# Patient Record
Sex: FEMALE | Race: BLACK OR AFRICAN AMERICAN | NOT HISPANIC OR LATINO | Employment: FULL TIME | URBAN - NONMETROPOLITAN AREA
[De-identification: names, ages, dates, MRNs, and addresses within clinical notes are randomized per-mention and may not be internally consistent; named-entity substitution may affect disease eponyms.]

---

## 2019-01-10 ENCOUNTER — ANESTHESIA EVENT (OUTPATIENT)
Dept: CARDIOVASCULAR ICU | Facility: HOSPITAL | Age: 29
End: 2019-01-10

## 2019-01-10 ENCOUNTER — ANESTHESIA (OUTPATIENT)
Dept: CARDIOVASCULAR ICU | Facility: HOSPITAL | Age: 29
End: 2019-01-10

## 2019-01-10 ENCOUNTER — ANESTHESIA EVENT (OUTPATIENT)
Dept: PERIOP | Facility: HOSPITAL | Age: 29
End: 2019-01-10

## 2019-01-10 ENCOUNTER — HOSPITAL ENCOUNTER (INPATIENT)
Facility: HOSPITAL | Age: 29
LOS: 7 days | Discharge: HOME | End: 2019-01-17
Attending: INTERNAL MEDICINE | Admitting: THORACIC SURGERY (CARDIOTHORACIC VASCULAR SURGERY)

## 2019-01-10 ENCOUNTER — APPOINTMENT (OUTPATIENT)
Dept: GENERAL RADIOLOGY | Facility: HOSPITAL | Age: 29
End: 2019-01-10

## 2019-01-10 DIAGNOSIS — J90 PLEURAL EFFUSION: ICD-10-CM

## 2019-01-10 DIAGNOSIS — I30.9 ACUTE PERICARDITIS ASSOCIATED WITH RHEUMATOID ARTHRITIS (HCC): ICD-10-CM

## 2019-01-10 DIAGNOSIS — M06.9 RHEUMATOID ARTHRITIS, INVOLVING UNSPECIFIED SITE, UNSPECIFIED RHEUMATOID FACTOR PRESENCE: ICD-10-CM

## 2019-01-10 DIAGNOSIS — M06.9 ACUTE PERICARDITIS ASSOCIATED WITH RHEUMATOID ARTHRITIS (HCC): ICD-10-CM

## 2019-01-10 DIAGNOSIS — I31.4 CARDIAC TAMPONADE: Primary | ICD-10-CM

## 2019-01-10 PROBLEM — D57.3 SICKLE CELL TRAIT (HCC): Status: ACTIVE | Noted: 2019-01-10

## 2019-01-10 PROBLEM — I31.9 PERICARDITIS: Status: ACTIVE | Noted: 2019-01-10

## 2019-01-10 PROBLEM — R07.9 CHEST PAIN: Status: ACTIVE | Noted: 2019-01-10

## 2019-01-10 PROBLEM — R00.0 SINUS TACHYCARDIA: Status: ACTIVE | Noted: 2019-01-10

## 2019-01-10 LAB
ABO GROUP BLD: NORMAL
ALBUMIN SERPL-MCNC: 3 G/DL (ref 3.5–5)
ALBUMIN/GLOB SERPL: 0.8 G/DL (ref 1.1–2.5)
ALP SERPL-CCNC: 83 U/L (ref 24–120)
ALT SERPL W P-5'-P-CCNC: 53 U/L (ref 0–54)
ANION GAP SERPL CALCULATED.3IONS-SCNC: 12 MMOL/L (ref 4–13)
APTT PPP: 38.8 SECONDS (ref 24.1–34.8)
AST SERPL-CCNC: 35 U/L (ref 7–45)
BASOPHILS # BLD AUTO: 0.02 10*3/MM3 (ref 0–0.2)
BASOPHILS NFR BLD AUTO: 0.2 % (ref 0–2)
BILIRUB SERPL-MCNC: 0.7 MG/DL (ref 0.1–1)
BLD GP AB SCN SERPL QL: NEGATIVE
BUN BLD-MCNC: 7 MG/DL (ref 5–21)
BUN/CREAT SERPL: 10.3 (ref 7–25)
CALCIUM SPEC-SCNC: 7.7 MG/DL (ref 8.4–10.4)
CHLORIDE SERPL-SCNC: 99 MMOL/L (ref 98–110)
CHROMATIN AB SERPL-ACNC: 185.1 IU/ML (ref 0–11.9)
CK SERPL-CCNC: 58 U/L (ref 0–203)
CO2 SERPL-SCNC: 29 MMOL/L (ref 24–31)
CREAT BLD-MCNC: 0.68 MG/DL (ref 0.5–1.4)
CRP SERPL-MCNC: 23.88 MG/DL (ref 0–0.99)
D-LACTATE SERPL-SCNC: 1.1 MMOL/L (ref 0.5–2)
DEPRECATED RDW RBC AUTO: 43.8 FL (ref 40–54)
EOSINOPHIL # BLD AUTO: 0.24 10*3/MM3 (ref 0–0.7)
EOSINOPHIL NFR BLD AUTO: 2.3 % (ref 0–4)
ERYTHROCYTE [DISTWIDTH] IN BLOOD BY AUTOMATED COUNT: 14.5 % (ref 12–15)
ERYTHROCYTE [SEDIMENTATION RATE] IN BLOOD: 46 MM/HR (ref 0–20)
GFR SERPL CREATININE-BSD FRML MDRD: 124 ML/MIN/1.73
GLOBULIN UR ELPH-MCNC: 3.6 GM/DL
GLUCOSE BLD-MCNC: 100 MG/DL (ref 70–100)
HAV IGM SERPL QL IA: NEGATIVE
HBV CORE IGM SERPL QL IA: NEGATIVE
HBV SURFACE AG SERPL QL IA: NEGATIVE
HCT VFR BLD AUTO: 25.7 % (ref 37–47)
HCV AB SER DONR QL: NEGATIVE
HCV S/C RATIO: 0.05 (ref 0–0.99)
HGB BLD-MCNC: 8.4 G/DL (ref 12–16)
IMM GRANULOCYTES # BLD AUTO: 0.04 10*3/MM3 (ref 0–0.03)
IMM GRANULOCYTES NFR BLD AUTO: 0.4 % (ref 0–5)
INR PPP: 1.4 (ref 0.91–1.09)
LYMPHOCYTES # BLD AUTO: 1.14 10*3/MM3 (ref 0.72–4.86)
LYMPHOCYTES NFR BLD AUTO: 10.8 % (ref 15–45)
MCH RBC QN AUTO: 27.2 PG (ref 28–32)
MCHC RBC AUTO-ENTMCNC: 32.7 G/DL (ref 33–36)
MCV RBC AUTO: 83.2 FL (ref 82–98)
MONOCYTES # BLD AUTO: 1.26 10*3/MM3 (ref 0.19–1.3)
MONOCYTES NFR BLD AUTO: 11.9 % (ref 4–12)
NEUTROPHILS # BLD AUTO: 7.87 10*3/MM3 (ref 1.87–8.4)
NEUTROPHILS NFR BLD AUTO: 74.4 % (ref 39–78)
NRBC BLD AUTO-RTO: 0 /100 WBC (ref 0–0)
PLATELET # BLD AUTO: 444 10*3/MM3 (ref 130–400)
PMV BLD AUTO: 10.9 FL (ref 6–12)
POTASSIUM BLD-SCNC: 3.2 MMOL/L (ref 3.5–5.3)
PROCALCITONIN SERPL-MCNC: <0.25 NG/ML
PROT SERPL-MCNC: 6.6 G/DL (ref 6.3–8.7)
PROTHROMBIN TIME: 17.6 SECONDS (ref 11.9–14.6)
RBC # BLD AUTO: 3.09 10*6/MM3 (ref 4.2–5.4)
RH BLD: POSITIVE
SODIUM BLD-SCNC: 140 MMOL/L (ref 135–145)
T&S EXPIRATION DATE: NORMAL
TROPONIN I SERPL-MCNC: <0.012 NG/ML (ref 0–0.03)
TSH SERPL DL<=0.05 MIU/L-ACNC: 1.38 MIU/ML (ref 0.47–4.68)
WBC NRBC COR # BLD: 10.57 10*3/MM3 (ref 4.8–10.8)

## 2019-01-10 PROCEDURE — 83520 IMMUNOASSAY QUANT NOS NONAB: CPT | Performed by: INTERNAL MEDICINE

## 2019-01-10 PROCEDURE — 86850 RBC ANTIBODY SCREEN: CPT | Performed by: THORACIC SURGERY (CARDIOTHORACIC VASCULAR SURGERY)

## 2019-01-10 PROCEDURE — 71045 X-RAY EXAM CHEST 1 VIEW: CPT

## 2019-01-10 PROCEDURE — 80053 COMPREHEN METABOLIC PANEL: CPT | Performed by: INTERNAL MEDICINE

## 2019-01-10 PROCEDURE — 86256 FLUORESCENT ANTIBODY TITER: CPT | Performed by: INTERNAL MEDICINE

## 2019-01-10 PROCEDURE — 86235 NUCLEAR ANTIGEN ANTIBODY: CPT | Performed by: INTERNAL MEDICINE

## 2019-01-10 PROCEDURE — 83516 IMMUNOASSAY NONANTIBODY: CPT | Performed by: INTERNAL MEDICINE

## 2019-01-10 PROCEDURE — 84484 ASSAY OF TROPONIN QUANT: CPT | Performed by: INTERNAL MEDICINE

## 2019-01-10 PROCEDURE — 85610 PROTHROMBIN TIME: CPT | Performed by: INTERNAL MEDICINE

## 2019-01-10 PROCEDURE — 93005 ELECTROCARDIOGRAM TRACING: CPT | Performed by: INTERNAL MEDICINE

## 2019-01-10 PROCEDURE — 87150 DNA/RNA AMPLIFIED PROBE: CPT | Performed by: INTERNAL MEDICINE

## 2019-01-10 PROCEDURE — 93010 ELECTROCARDIOGRAM REPORT: CPT | Performed by: INTERNAL MEDICINE

## 2019-01-10 PROCEDURE — 36415 COLL VENOUS BLD VENIPUNCTURE: CPT | Performed by: INTERNAL MEDICINE

## 2019-01-10 PROCEDURE — 86431 RHEUMATOID FACTOR QUANT: CPT | Performed by: INTERNAL MEDICINE

## 2019-01-10 PROCEDURE — 86160 COMPLEMENT ANTIGEN: CPT | Performed by: INTERNAL MEDICINE

## 2019-01-10 PROCEDURE — 82550 ASSAY OF CK (CPK): CPT | Performed by: INTERNAL MEDICINE

## 2019-01-10 PROCEDURE — 84145 PROCALCITONIN (PCT): CPT | Performed by: INTERNAL MEDICINE

## 2019-01-10 PROCEDURE — 85730 THROMBOPLASTIN TIME PARTIAL: CPT | Performed by: THORACIC SURGERY (CARDIOTHORACIC VASCULAR SURGERY)

## 2019-01-10 PROCEDURE — 94799 UNLISTED PULMONARY SVC/PX: CPT

## 2019-01-10 PROCEDURE — 86901 BLOOD TYPING SEROLOGIC RH(D): CPT | Performed by: THORACIC SURGERY (CARDIOTHORACIC VASCULAR SURGERY)

## 2019-01-10 PROCEDURE — 84443 ASSAY THYROID STIM HORMONE: CPT | Performed by: INTERNAL MEDICINE

## 2019-01-10 PROCEDURE — 82785 ASSAY OF IGE: CPT | Performed by: INTERNAL MEDICINE

## 2019-01-10 PROCEDURE — 86038 ANTINUCLEAR ANTIBODIES: CPT | Performed by: INTERNAL MEDICINE

## 2019-01-10 PROCEDURE — 03HY32Z INSERTION OF MONITORING DEVICE INTO UPPER ARTERY, PERCUTANEOUS APPROACH: ICD-10-PCS | Performed by: INTERNAL MEDICINE

## 2019-01-10 PROCEDURE — 85651 RBC SED RATE NONAUTOMATED: CPT | Performed by: INTERNAL MEDICINE

## 2019-01-10 PROCEDURE — 85025 COMPLETE CBC W/AUTO DIFF WBC: CPT | Performed by: INTERNAL MEDICINE

## 2019-01-10 PROCEDURE — 25010000002 METHYLPREDNISOLONE PER 125 MG: Performed by: INTERNAL MEDICINE

## 2019-01-10 PROCEDURE — 86900 BLOOD TYPING SEROLOGIC ABO: CPT | Performed by: THORACIC SURGERY (CARDIOTHORACIC VASCULAR SURGERY)

## 2019-01-10 PROCEDURE — 80074 ACUTE HEPATITIS PANEL: CPT | Performed by: INTERNAL MEDICINE

## 2019-01-10 PROCEDURE — 87040 BLOOD CULTURE FOR BACTERIA: CPT | Performed by: INTERNAL MEDICINE

## 2019-01-10 PROCEDURE — 86140 C-REACTIVE PROTEIN: CPT | Performed by: INTERNAL MEDICINE

## 2019-01-10 PROCEDURE — 86200 CCP ANTIBODY: CPT | Performed by: INTERNAL MEDICINE

## 2019-01-10 PROCEDURE — 83605 ASSAY OF LACTIC ACID: CPT | Performed by: INTERNAL MEDICINE

## 2019-01-10 PROCEDURE — 86225 DNA ANTIBODY NATIVE: CPT | Performed by: INTERNAL MEDICINE

## 2019-01-10 PROCEDURE — 87147 CULTURE TYPE IMMUNOLOGIC: CPT | Performed by: INTERNAL MEDICINE

## 2019-01-10 RX ORDER — METHYLPREDNISOLONE SODIUM SUCCINATE 125 MG/2ML
80 INJECTION, POWDER, LYOPHILIZED, FOR SOLUTION INTRAMUSCULAR; INTRAVENOUS EVERY 12 HOURS
Status: DISCONTINUED | OUTPATIENT
Start: 2019-01-10 | End: 2019-01-11 | Stop reason: HOSPADM

## 2019-01-10 RX ORDER — SODIUM CHLORIDE 0.9 % (FLUSH) 0.9 %
3 SYRINGE (ML) INJECTION EVERY 12 HOURS SCHEDULED
Status: DISCONTINUED | OUTPATIENT
Start: 2019-01-10 | End: 2019-01-11 | Stop reason: HOSPADM

## 2019-01-10 RX ORDER — ONDANSETRON 2 MG/ML
4 INJECTION INTRAMUSCULAR; INTRAVENOUS EVERY 6 HOURS PRN
Status: DISCONTINUED | OUTPATIENT
Start: 2019-01-10 | End: 2019-01-11 | Stop reason: HOSPADM

## 2019-01-10 RX ORDER — IBUPROFEN 800 MG/1
800 TABLET ORAL 3 TIMES DAILY
Status: DISCONTINUED | OUTPATIENT
Start: 2019-01-10 | End: 2019-01-11 | Stop reason: HOSPADM

## 2019-01-10 RX ORDER — SODIUM CHLORIDE, SODIUM LACTATE, POTASSIUM CHLORIDE, CALCIUM CHLORIDE 600; 310; 30; 20 MG/100ML; MG/100ML; MG/100ML; MG/100ML
125 INJECTION, SOLUTION INTRAVENOUS CONTINUOUS
Status: DISCONTINUED | OUTPATIENT
Start: 2019-01-10 | End: 2019-01-11 | Stop reason: HOSPADM

## 2019-01-10 RX ORDER — NALOXONE HCL 0.4 MG/ML
0.4 VIAL (ML) INJECTION
Status: DISCONTINUED | OUTPATIENT
Start: 2019-01-10 | End: 2019-01-11 | Stop reason: HOSPADM

## 2019-01-10 RX ORDER — POTASSIUM CHLORIDE 750 MG/1
40 CAPSULE, EXTENDED RELEASE ORAL 2 TIMES DAILY WITH MEALS
Status: DISCONTINUED | OUTPATIENT
Start: 2019-01-10 | End: 2019-01-11 | Stop reason: HOSPADM

## 2019-01-10 RX ORDER — COLCHICINE 0.6 MG/1
0.6 TABLET ORAL EVERY 12 HOURS SCHEDULED
Status: DISCONTINUED | OUTPATIENT
Start: 2019-01-10 | End: 2019-01-11 | Stop reason: HOSPADM

## 2019-01-10 RX ORDER — SODIUM CHLORIDE 0.9 % (FLUSH) 0.9 %
3-10 SYRINGE (ML) INJECTION AS NEEDED
Status: DISCONTINUED | OUTPATIENT
Start: 2019-01-10 | End: 2019-01-11 | Stop reason: HOSPADM

## 2019-01-10 RX ORDER — ACETAMINOPHEN 325 MG/1
650 TABLET ORAL EVERY 4 HOURS PRN
Status: DISCONTINUED | OUTPATIENT
Start: 2019-01-10 | End: 2019-01-11 | Stop reason: HOSPADM

## 2019-01-10 RX ORDER — MORPHINE SULFATE 2 MG/ML
1 INJECTION, SOLUTION INTRAMUSCULAR; INTRAVENOUS EVERY 4 HOURS PRN
Status: DISCONTINUED | OUTPATIENT
Start: 2019-01-10 | End: 2019-01-11 | Stop reason: HOSPADM

## 2019-01-10 RX ADMIN — METHYLPREDNISOLONE SODIUM SUCCINATE 80 MG: 125 INJECTION, POWDER, FOR SOLUTION INTRAMUSCULAR; INTRAVENOUS at 20:54

## 2019-01-10 RX ADMIN — COLCHICINE 0.6 MG: 0.6 TABLET, FILM COATED ORAL at 21:49

## 2019-01-10 RX ADMIN — SODIUM CHLORIDE, PRESERVATIVE FREE 3 ML: 5 INJECTION INTRAVENOUS at 20:55

## 2019-01-10 RX ADMIN — IBUPROFEN 800 MG: 800 TABLET ORAL at 21:50

## 2019-01-10 RX ADMIN — SODIUM CHLORIDE, POTASSIUM CHLORIDE, SODIUM LACTATE AND CALCIUM CHLORIDE 125 ML/HR: 600; 310; 30; 20 INJECTION, SOLUTION INTRAVENOUS at 20:06

## 2019-01-10 RX ADMIN — POTASSIUM CHLORIDE 40 MEQ: 750 CAPSULE, EXTENDED RELEASE ORAL at 22:57

## 2019-01-11 ENCOUNTER — APPOINTMENT (OUTPATIENT)
Dept: GENERAL RADIOLOGY | Facility: HOSPITAL | Age: 29
End: 2019-01-11

## 2019-01-11 ENCOUNTER — ANESTHESIA (OUTPATIENT)
Dept: PERIOP | Facility: HOSPITAL | Age: 29
End: 2019-01-11

## 2019-01-11 PROBLEM — J96.01 ACUTE HYPOXEMIC RESPIRATORY FAILURE (HCC): Status: ACTIVE | Noted: 2019-01-11

## 2019-01-11 LAB
ANION GAP SERPL CALCULATED.3IONS-SCNC: 10 MMOL/L (ref 4–13)
ANION GAP SERPL CALCULATED.3IONS-SCNC: 9 MMOL/L (ref 4–13)
BACTERIA BLD CULT: ABNORMAL
BACTERIA UR QL AUTO: ABNORMAL /HPF
BILIRUB UR QL STRIP: NEGATIVE
BUN BLD-MCNC: 8 MG/DL (ref 5–21)
BUN BLD-MCNC: 9 MG/DL (ref 5–21)
BUN/CREAT SERPL: 14.8 (ref 7–25)
BUN/CREAT SERPL: 20 (ref 7–25)
CALCIUM SPEC-SCNC: 7.8 MG/DL (ref 8.4–10.4)
CALCIUM SPEC-SCNC: 7.9 MG/DL (ref 8.4–10.4)
CHLORIDE SERPL-SCNC: 103 MMOL/L (ref 98–110)
CHLORIDE SERPL-SCNC: 103 MMOL/L (ref 98–110)
CLARITY UR: CLEAR
CO2 SERPL-SCNC: 27 MMOL/L (ref 24–31)
CO2 SERPL-SCNC: 28 MMOL/L (ref 24–31)
COLOR UR: ABNORMAL
CREAT BLD-MCNC: 0.45 MG/DL (ref 0.5–1.4)
CREAT BLD-MCNC: 0.54 MG/DL (ref 0.5–1.4)
DEPRECATED RDW RBC AUTO: 41.9 FL (ref 40–54)
DEPRECATED RDW RBC AUTO: 42.5 FL (ref 40–54)
ERYTHROCYTE [DISTWIDTH] IN BLOOD BY AUTOMATED COUNT: 14.2 % (ref 12–15)
ERYTHROCYTE [DISTWIDTH] IN BLOOD BY AUTOMATED COUNT: 14.3 % (ref 12–15)
GFR SERPL CREATININE-BSD FRML MDRD: >150 ML/MIN/1.73
GFR SERPL CREATININE-BSD FRML MDRD: >150 ML/MIN/1.73
GLUCOSE BLD-MCNC: 138 MG/DL (ref 70–100)
GLUCOSE BLD-MCNC: 141 MG/DL (ref 70–100)
GLUCOSE UR STRIP-MCNC: NEGATIVE MG/DL
HCT VFR BLD AUTO: 23.8 % (ref 37–47)
HCT VFR BLD AUTO: 30.3 % (ref 37–47)
HGB BLD-MCNC: 10 G/DL (ref 12–16)
HGB BLD-MCNC: 7.9 G/DL (ref 12–16)
HGB UR QL STRIP.AUTO: NEGATIVE
HIV1+2 AB SER QL: NEGATIVE
HYALINE CASTS UR QL AUTO: ABNORMAL /LPF
KETONES UR QL STRIP: NEGATIVE
LEUKOCYTE ESTERASE UR QL STRIP.AUTO: ABNORMAL
MCH RBC QN AUTO: 26.7 PG (ref 28–32)
MCH RBC QN AUTO: 27 PG (ref 28–32)
MCHC RBC AUTO-ENTMCNC: 33 G/DL (ref 33–36)
MCHC RBC AUTO-ENTMCNC: 33.2 G/DL (ref 33–36)
MCV RBC AUTO: 81 FL (ref 82–98)
MCV RBC AUTO: 81.2 FL (ref 82–98)
NITRITE UR QL STRIP: NEGATIVE
PH UR STRIP.AUTO: 5.5 [PH] (ref 5–8)
PLATELET # BLD AUTO: 370 10*3/MM3 (ref 130–400)
PLATELET # BLD AUTO: 417 10*3/MM3 (ref 130–400)
PMV BLD AUTO: 10.4 FL (ref 6–12)
PMV BLD AUTO: 10.6 FL (ref 6–12)
POTASSIUM BLD-SCNC: 3.9 MMOL/L (ref 3.5–5.3)
POTASSIUM BLD-SCNC: 4.1 MMOL/L (ref 3.5–5.3)
PROT UR QL STRIP: ABNORMAL
RBC # BLD AUTO: 2.93 10*6/MM3 (ref 4.2–5.4)
RBC # BLD AUTO: 3.74 10*6/MM3 (ref 4.2–5.4)
RBC # UR: ABNORMAL /HPF
REF LAB TEST METHOD: ABNORMAL
SODIUM BLD-SCNC: 140 MMOL/L (ref 135–145)
SODIUM BLD-SCNC: 140 MMOL/L (ref 135–145)
SP GR UR STRIP: 1.01 (ref 1–1.03)
SQUAMOUS #/AREA URNS HPF: ABNORMAL /HPF
UROBILINOGEN UR QL STRIP: ABNORMAL
WBC NRBC COR # BLD: 10.58 10*3/MM3 (ref 4.8–10.8)
WBC NRBC COR # BLD: 9.4 10*3/MM3 (ref 4.8–10.8)
WBC UR QL AUTO: ABNORMAL /HPF

## 2019-01-11 PROCEDURE — 80048 BASIC METABOLIC PNL TOTAL CA: CPT | Performed by: THORACIC SURGERY (CARDIOTHORACIC VASCULAR SURGERY)

## 2019-01-11 PROCEDURE — 25010000002 NEOSTIGMINE PER 0.5 MG: Performed by: NURSE ANESTHETIST, CERTIFIED REGISTERED

## 2019-01-11 PROCEDURE — 81001 URINALYSIS AUTO W/SCOPE: CPT | Performed by: THORACIC SURGERY (CARDIOTHORACIC VASCULAR SURGERY)

## 2019-01-11 PROCEDURE — 0W9D00Z DRAINAGE OF PERICARDIAL CAVITY WITH DRAINAGE DEVICE, OPEN APPROACH: ICD-10-PCS | Performed by: THORACIC SURGERY (CARDIOTHORACIC VASCULAR SURGERY)

## 2019-01-11 PROCEDURE — 94799 UNLISTED PULMONARY SVC/PX: CPT

## 2019-01-11 PROCEDURE — 25010000002 ONDANSETRON PER 1 MG: Performed by: NURSE ANESTHETIST, CERTIFIED REGISTERED

## 2019-01-11 PROCEDURE — 86480 TB TEST CELL IMMUN MEASURE: CPT | Performed by: INTERNAL MEDICINE

## 2019-01-11 PROCEDURE — 80048 BASIC METABOLIC PNL TOTAL CA: CPT | Performed by: INTERNAL MEDICINE

## 2019-01-11 PROCEDURE — 94760 N-INVAS EAR/PLS OXIMETRY 1: CPT

## 2019-01-11 PROCEDURE — 85027 COMPLETE CBC AUTOMATED: CPT | Performed by: THORACIC SURGERY (CARDIOTHORACIC VASCULAR SURGERY)

## 2019-01-11 PROCEDURE — G0432 EIA HIV-1/HIV-2 SCREEN: HCPCS | Performed by: INTERNAL MEDICINE

## 2019-01-11 PROCEDURE — C1729 CATH, DRAINAGE: HCPCS | Performed by: THORACIC SURGERY (CARDIOTHORACIC VASCULAR SURGERY)

## 2019-01-11 PROCEDURE — 85027 COMPLETE CBC AUTOMATED: CPT | Performed by: INTERNAL MEDICINE

## 2019-01-11 PROCEDURE — 87206 SMEAR FLUORESCENT/ACID STAI: CPT | Performed by: THORACIC SURGERY (CARDIOTHORACIC VASCULAR SURGERY)

## 2019-01-11 PROCEDURE — 87205 SMEAR GRAM STAIN: CPT | Performed by: THORACIC SURGERY (CARDIOTHORACIC VASCULAR SURGERY)

## 2019-01-11 PROCEDURE — 87176 TISSUE HOMOGENIZATION CULTR: CPT | Performed by: THORACIC SURGERY (CARDIOTHORACIC VASCULAR SURGERY)

## 2019-01-11 PROCEDURE — 25010000002 MIDAZOLAM PER 1 MG: Performed by: NURSE ANESTHETIST, CERTIFIED REGISTERED

## 2019-01-11 PROCEDURE — 33025 INCISION OF HEART SAC: CPT | Performed by: THORACIC SURGERY (CARDIOTHORACIC VASCULAR SURGERY)

## 2019-01-11 PROCEDURE — 87102 FUNGUS ISOLATION CULTURE: CPT | Performed by: THORACIC SURGERY (CARDIOTHORACIC VASCULAR SURGERY)

## 2019-01-11 PROCEDURE — 87070 CULTURE OTHR SPECIMN AEROBIC: CPT | Performed by: THORACIC SURGERY (CARDIOTHORACIC VASCULAR SURGERY)

## 2019-01-11 PROCEDURE — 25010000002 CEFAZOLIN PER 500 MG: Performed by: THORACIC SURGERY (CARDIOTHORACIC VASCULAR SURGERY)

## 2019-01-11 PROCEDURE — 25010000002 MIDAZOLAM PER 1 MG: Performed by: ANESTHESIOLOGY

## 2019-01-11 PROCEDURE — 25010000002 METHYLPREDNISOLONE PER 125 MG: Performed by: ANESTHESIOLOGY

## 2019-01-11 PROCEDURE — 87015 SPECIMEN INFECT AGNT CONCNTJ: CPT | Performed by: THORACIC SURGERY (CARDIOTHORACIC VASCULAR SURGERY)

## 2019-01-11 PROCEDURE — 88112 CYTOPATH CELL ENHANCE TECH: CPT | Performed by: THORACIC SURGERY (CARDIOTHORACIC VASCULAR SURGERY)

## 2019-01-11 PROCEDURE — 87075 CULTR BACTERIA EXCEPT BLOOD: CPT | Performed by: THORACIC SURGERY (CARDIOTHORACIC VASCULAR SURGERY)

## 2019-01-11 PROCEDURE — 88305 TISSUE EXAM BY PATHOLOGIST: CPT | Performed by: THORACIC SURGERY (CARDIOTHORACIC VASCULAR SURGERY)

## 2019-01-11 PROCEDURE — 94640 AIRWAY INHALATION TREATMENT: CPT

## 2019-01-11 PROCEDURE — 25010000003 CEFAZOLIN PER 500 MG: Performed by: NURSE ANESTHETIST, CERTIFIED REGISTERED

## 2019-01-11 PROCEDURE — 87116 MYCOBACTERIA CULTURE: CPT | Performed by: THORACIC SURGERY (CARDIOTHORACIC VASCULAR SURGERY)

## 2019-01-11 PROCEDURE — 88312 SPECIAL STAINS GROUP 1: CPT | Performed by: THORACIC SURGERY (CARDIOTHORACIC VASCULAR SURGERY)

## 2019-01-11 PROCEDURE — 02BN0ZZ EXCISION OF PERICARDIUM, OPEN APPROACH: ICD-10-PCS | Performed by: THORACIC SURGERY (CARDIOTHORACIC VASCULAR SURGERY)

## 2019-01-11 PROCEDURE — 71045 X-RAY EXAM CHEST 1 VIEW: CPT

## 2019-01-11 RX ORDER — PREDNISONE 1 MG/1
5 TABLET ORAL DAILY
Status: DISCONTINUED | OUTPATIENT
Start: 2019-01-18 | End: 2019-01-17 | Stop reason: HOSPADM

## 2019-01-11 RX ORDER — BUPIVACAINE HCL/0.9 % NACL/PF 0.1 %
2 PLASTIC BAG, INJECTION (ML) EPIDURAL EVERY 8 HOURS
Status: COMPLETED | OUTPATIENT
Start: 2019-01-11 | End: 2019-01-12

## 2019-01-11 RX ORDER — METHYLPREDNISOLONE SODIUM SUCCINATE 125 MG/2ML
125 INJECTION, POWDER, LYOPHILIZED, FOR SOLUTION INTRAMUSCULAR; INTRAVENOUS ONCE
Status: COMPLETED | OUTPATIENT
Start: 2019-01-11 | End: 2019-01-11

## 2019-01-11 RX ORDER — SODIUM CHLORIDE, SODIUM LACTATE, POTASSIUM CHLORIDE, CALCIUM CHLORIDE 600; 310; 30; 20 MG/100ML; MG/100ML; MG/100ML; MG/100ML
9 INJECTION, SOLUTION INTRAVENOUS CONTINUOUS
Status: DISCONTINUED | OUTPATIENT
Start: 2019-01-11 | End: 2019-01-11 | Stop reason: HOSPADM

## 2019-01-11 RX ORDER — LABETALOL HYDROCHLORIDE 5 MG/ML
5 INJECTION, SOLUTION INTRAVENOUS
Status: DISCONTINUED | OUTPATIENT
Start: 2019-01-11 | End: 2019-01-11 | Stop reason: HOSPADM

## 2019-01-11 RX ORDER — PREDNISONE 10 MG/1
10 TABLET ORAL DAILY
Status: COMPLETED | OUTPATIENT
Start: 2019-01-15 | End: 2019-01-17

## 2019-01-11 RX ORDER — CEFAZOLIN SODIUM 1 G/3ML
INJECTION, POWDER, FOR SOLUTION INTRAMUSCULAR; INTRAVENOUS AS NEEDED
Status: DISCONTINUED | OUTPATIENT
Start: 2019-01-11 | End: 2019-01-11 | Stop reason: SURG

## 2019-01-11 RX ORDER — PHENYLEPHRINE HCL IN 0.9% NACL 0.8MG/10ML
SYRINGE (ML) INTRAVENOUS AS NEEDED
Status: DISCONTINUED | OUTPATIENT
Start: 2019-01-11 | End: 2019-01-11 | Stop reason: SURG

## 2019-01-11 RX ORDER — FENTANYL CITRATE 50 UG/ML
25 INJECTION, SOLUTION INTRAMUSCULAR; INTRAVENOUS
Status: DISCONTINUED | OUTPATIENT
Start: 2019-01-11 | End: 2019-01-11 | Stop reason: HOSPADM

## 2019-01-11 RX ORDER — IPRATROPIUM BROMIDE AND ALBUTEROL SULFATE 2.5; .5 MG/3ML; MG/3ML
3 SOLUTION RESPIRATORY (INHALATION) ONCE AS NEEDED
Status: DISCONTINUED | OUTPATIENT
Start: 2019-01-11 | End: 2019-01-11 | Stop reason: HOSPADM

## 2019-01-11 RX ORDER — BISACODYL 10 MG
10 SUPPOSITORY, RECTAL RECTAL DAILY PRN
Status: DISCONTINUED | OUTPATIENT
Start: 2019-01-11 | End: 2019-01-17 | Stop reason: HOSPADM

## 2019-01-11 RX ORDER — SODIUM CHLORIDE 0.9 % (FLUSH) 0.9 %
1-10 SYRINGE (ML) INJECTION AS NEEDED
Status: DISCONTINUED | OUTPATIENT
Start: 2019-01-11 | End: 2019-01-11 | Stop reason: HOSPADM

## 2019-01-11 RX ORDER — IPRATROPIUM BROMIDE AND ALBUTEROL SULFATE 2.5; .5 MG/3ML; MG/3ML
3 SOLUTION RESPIRATORY (INHALATION)
Status: COMPLETED | OUTPATIENT
Start: 2019-01-11 | End: 2019-01-13

## 2019-01-11 RX ORDER — ONDANSETRON 4 MG/1
4 TABLET, FILM COATED ORAL EVERY 6 HOURS PRN
Status: DISCONTINUED | OUTPATIENT
Start: 2019-01-11 | End: 2019-01-17 | Stop reason: HOSPADM

## 2019-01-11 RX ORDER — ONDANSETRON 2 MG/ML
4 INJECTION INTRAMUSCULAR; INTRAVENOUS EVERY 6 HOURS PRN
Status: DISCONTINUED | OUTPATIENT
Start: 2019-01-11 | End: 2019-01-17 | Stop reason: HOSPADM

## 2019-01-11 RX ORDER — ACETYLCYSTEINE 200 MG/ML
3 SOLUTION ORAL; RESPIRATORY (INHALATION)
Status: DISCONTINUED | OUTPATIENT
Start: 2019-01-11 | End: 2019-01-13

## 2019-01-11 RX ORDER — MAGNESIUM HYDROXIDE 1200 MG/15ML
LIQUID ORAL AS NEEDED
Status: DISCONTINUED | OUTPATIENT
Start: 2019-01-11 | End: 2019-01-11 | Stop reason: HOSPADM

## 2019-01-11 RX ORDER — COLCHICINE 0.6 MG/1
0.6 TABLET ORAL EVERY 12 HOURS SCHEDULED
Status: DISCONTINUED | OUTPATIENT
Start: 2019-01-11 | End: 2019-01-17 | Stop reason: HOSPADM

## 2019-01-11 RX ORDER — MIDAZOLAM HYDROCHLORIDE 1 MG/ML
INJECTION INTRAMUSCULAR; INTRAVENOUS AS NEEDED
Status: DISCONTINUED | OUTPATIENT
Start: 2019-01-11 | End: 2019-01-11 | Stop reason: SURG

## 2019-01-11 RX ORDER — SUFENTANIL CITRATE 50 UG/ML
INJECTION EPIDURAL; INTRAVENOUS AS NEEDED
Status: DISCONTINUED | OUTPATIENT
Start: 2019-01-11 | End: 2019-01-11 | Stop reason: SURG

## 2019-01-11 RX ORDER — MIDAZOLAM HYDROCHLORIDE 1 MG/ML
1 INJECTION INTRAMUSCULAR; INTRAVENOUS
Status: DISCONTINUED | OUTPATIENT
Start: 2019-01-11 | End: 2019-01-11 | Stop reason: HOSPADM

## 2019-01-11 RX ORDER — ROCURONIUM BROMIDE 10 MG/ML
INJECTION, SOLUTION INTRAVENOUS AS NEEDED
Status: DISCONTINUED | OUTPATIENT
Start: 2019-01-11 | End: 2019-01-11 | Stop reason: SURG

## 2019-01-11 RX ORDER — NALOXONE HCL 0.4 MG/ML
0.04 VIAL (ML) INJECTION AS NEEDED
Status: DISCONTINUED | OUTPATIENT
Start: 2019-01-11 | End: 2019-01-11 | Stop reason: HOSPADM

## 2019-01-11 RX ORDER — ONDANSETRON 2 MG/ML
INJECTION INTRAMUSCULAR; INTRAVENOUS AS NEEDED
Status: DISCONTINUED | OUTPATIENT
Start: 2019-01-11 | End: 2019-01-11 | Stop reason: SURG

## 2019-01-11 RX ORDER — ONDANSETRON 4 MG/1
4 TABLET, ORALLY DISINTEGRATING ORAL EVERY 6 HOURS PRN
Status: DISCONTINUED | OUTPATIENT
Start: 2019-01-11 | End: 2019-01-17 | Stop reason: HOSPADM

## 2019-01-11 RX ORDER — SODIUM CHLORIDE, SODIUM LACTATE, POTASSIUM CHLORIDE, CALCIUM CHLORIDE 600; 310; 30; 20 MG/100ML; MG/100ML; MG/100ML; MG/100ML
20 INJECTION, SOLUTION INTRAVENOUS CONTINUOUS
Status: DISCONTINUED | OUTPATIENT
Start: 2019-01-11 | End: 2019-01-11 | Stop reason: HOSPADM

## 2019-01-11 RX ORDER — DOCUSATE SODIUM 100 MG/1
100 CAPSULE, LIQUID FILLED ORAL 2 TIMES DAILY
Status: DISCONTINUED | OUTPATIENT
Start: 2019-01-11 | End: 2019-01-17 | Stop reason: HOSPADM

## 2019-01-11 RX ORDER — DEXTROSE MONOHYDRATE 25 G/50ML
12.5 INJECTION, SOLUTION INTRAVENOUS AS NEEDED
Status: DISCONTINUED | OUTPATIENT
Start: 2019-01-11 | End: 2019-01-11 | Stop reason: HOSPADM

## 2019-01-11 RX ORDER — MORPHINE SULFATE 2 MG/ML
2 INJECTION, SOLUTION INTRAMUSCULAR; INTRAVENOUS
Status: DISCONTINUED | OUTPATIENT
Start: 2019-01-11 | End: 2019-01-11 | Stop reason: HOSPADM

## 2019-01-11 RX ORDER — SODIUM CHLORIDE 9 MG/ML
75 INJECTION, SOLUTION INTRAVENOUS CONTINUOUS
Status: DISCONTINUED | OUTPATIENT
Start: 2019-01-11 | End: 2019-01-14

## 2019-01-11 RX ORDER — ONDANSETRON 2 MG/ML
4 INJECTION INTRAMUSCULAR; INTRAVENOUS AS NEEDED
Status: DISCONTINUED | OUTPATIENT
Start: 2019-01-11 | End: 2019-01-11 | Stop reason: HOSPADM

## 2019-01-11 RX ORDER — PREDNISONE 20 MG/1
20 TABLET ORAL DAILY
Status: COMPLETED | OUTPATIENT
Start: 2019-01-12 | End: 2019-01-14

## 2019-01-11 RX ORDER — PANTOPRAZOLE SODIUM 40 MG/1
40 TABLET, DELAYED RELEASE ORAL DAILY
COMMUNITY

## 2019-01-11 RX ORDER — FLUMAZENIL 0.1 MG/ML
0.2 INJECTION INTRAVENOUS AS NEEDED
Status: DISCONTINUED | OUTPATIENT
Start: 2019-01-11 | End: 2019-01-11 | Stop reason: HOSPADM

## 2019-01-11 RX ORDER — OXYCODONE AND ACETAMINOPHEN 10; 325 MG/1; MG/1
1 TABLET ORAL ONCE AS NEEDED
Status: COMPLETED | OUTPATIENT
Start: 2019-01-11 | End: 2019-01-11

## 2019-01-11 RX ORDER — GLYCOPYRROLATE 0.2 MG/ML
INJECTION INTRAMUSCULAR; INTRAVENOUS AS NEEDED
Status: DISCONTINUED | OUTPATIENT
Start: 2019-01-11 | End: 2019-01-11 | Stop reason: SURG

## 2019-01-11 RX ORDER — HYDRALAZINE HYDROCHLORIDE 20 MG/ML
5 INJECTION INTRAMUSCULAR; INTRAVENOUS
Status: DISCONTINUED | OUTPATIENT
Start: 2019-01-11 | End: 2019-01-11 | Stop reason: HOSPADM

## 2019-01-11 RX ORDER — MEPERIDINE HYDROCHLORIDE 25 MG/ML
12.5 INJECTION INTRAMUSCULAR; INTRAVENOUS; SUBCUTANEOUS
Status: DISCONTINUED | OUTPATIENT
Start: 2019-01-11 | End: 2019-01-11 | Stop reason: HOSPADM

## 2019-01-11 RX ORDER — MIDAZOLAM HYDROCHLORIDE 1 MG/ML
2 INJECTION INTRAMUSCULAR; INTRAVENOUS
Status: DISCONTINUED | OUTPATIENT
Start: 2019-01-11 | End: 2019-01-11 | Stop reason: HOSPADM

## 2019-01-11 RX ORDER — IBUPROFEN 800 MG/1
800 TABLET ORAL EVERY 6 HOURS PRN
COMMUNITY
End: 2019-01-17 | Stop reason: HOSPADM

## 2019-01-11 RX ORDER — METOCLOPRAMIDE HYDROCHLORIDE 5 MG/ML
5 INJECTION INTRAMUSCULAR; INTRAVENOUS
Status: DISCONTINUED | OUTPATIENT
Start: 2019-01-11 | End: 2019-01-11 | Stop reason: HOSPADM

## 2019-01-11 RX ORDER — OXYCODONE HYDROCHLORIDE AND ACETAMINOPHEN 5; 325 MG/1; MG/1
1 TABLET ORAL
Status: DISCONTINUED | OUTPATIENT
Start: 2019-01-11 | End: 2019-01-17 | Stop reason: HOSPADM

## 2019-01-11 RX ORDER — FENTANYL CITRATE 50 UG/ML
25 INJECTION, SOLUTION INTRAMUSCULAR; INTRAVENOUS AS NEEDED
Status: DISCONTINUED | OUTPATIENT
Start: 2019-01-11 | End: 2019-01-11 | Stop reason: HOSPADM

## 2019-01-11 RX ADMIN — SODIUM CHLORIDE, POTASSIUM CHLORIDE, SODIUM LACTATE AND CALCIUM CHLORIDE 20 ML/HR: 600; 310; 30; 20 INJECTION, SOLUTION INTRAVENOUS at 07:52

## 2019-01-11 RX ADMIN — CEFAZOLIN SODIUM 2 G: 10 INJECTION, POWDER, FOR SOLUTION INTRAVENOUS at 17:29

## 2019-01-11 RX ADMIN — DOCUSATE SODIUM 100 MG: 100 CAPSULE ORAL at 20:11

## 2019-01-11 RX ADMIN — Medication 3 MG: at 09:17

## 2019-01-11 RX ADMIN — IPRATROPIUM BROMIDE AND ALBUTEROL SULFATE 3 ML: 2.5; .5 SOLUTION RESPIRATORY (INHALATION) at 21:01

## 2019-01-11 RX ADMIN — OXYCODONE HYDROCHLORIDE AND ACETAMINOPHEN 1 TABLET: 10; 325 TABLET ORAL at 10:57

## 2019-01-11 RX ADMIN — Medication 80 MCG: at 08:48

## 2019-01-11 RX ADMIN — COLCHICINE 0.6 MG: 0.6 TABLET, FILM COATED ORAL at 20:11

## 2019-01-11 RX ADMIN — OXYCODONE HYDROCHLORIDE AND ACETAMINOPHEN 1 TABLET: 5; 325 TABLET ORAL at 23:10

## 2019-01-11 RX ADMIN — METHYLPREDNISOLONE SODIUM SUCCINATE 125 MG: 125 INJECTION, POWDER, FOR SOLUTION INTRAMUSCULAR; INTRAVENOUS at 07:41

## 2019-01-11 RX ADMIN — METOPROLOL TARTRATE 12.5 MG: 25 TABLET, FILM COATED ORAL at 13:16

## 2019-01-11 RX ADMIN — SUFENTANIL CITRATE 15 MCG: 50 INJECTION, SOLUTION EPIDURAL; INTRAVENOUS at 08:03

## 2019-01-11 RX ADMIN — GLYCOPYRROLATE 0.4 MG: 0.2 INJECTION, SOLUTION INTRAMUSCULAR; INTRAVENOUS at 09:17

## 2019-01-11 RX ADMIN — Medication 80 MCG: at 08:28

## 2019-01-11 RX ADMIN — SODIUM CHLORIDE 500 ML: 9 INJECTION, SOLUTION INTRAVENOUS at 13:17

## 2019-01-11 RX ADMIN — CEFAZOLIN 2 G: 1 INJECTION, POWDER, FOR SOLUTION INTRAVENOUS at 08:15

## 2019-01-11 RX ADMIN — SUFENTANIL CITRATE 20 MCG: 50 INJECTION, SOLUTION EPIDURAL; INTRAVENOUS at 08:40

## 2019-01-11 RX ADMIN — METOPROLOL TARTRATE 12.5 MG: 25 TABLET, FILM COATED ORAL at 21:17

## 2019-01-11 RX ADMIN — LIDOCAINE HYDROCHLORIDE 0.5 ML: 10 INJECTION, SOLUTION EPIDURAL; INFILTRATION; INTRACAUDAL; PERINEURAL at 07:52

## 2019-01-11 RX ADMIN — IPRATROPIUM BROMIDE AND ALBUTEROL SULFATE 3 ML: 2.5; .5 SOLUTION RESPIRATORY (INHALATION) at 14:31

## 2019-01-11 RX ADMIN — MIDAZOLAM HYDROCHLORIDE 2 MG: 1 INJECTION, SOLUTION INTRAMUSCULAR; INTRAVENOUS at 07:42

## 2019-01-11 RX ADMIN — MIDAZOLAM HYDROCHLORIDE 3 MG: 1 INJECTION, SOLUTION INTRAMUSCULAR; INTRAVENOUS at 08:06

## 2019-01-11 RX ADMIN — CEFAZOLIN SODIUM 2 G: 10 INJECTION, POWDER, FOR SOLUTION INTRAVENOUS at 23:10

## 2019-01-11 RX ADMIN — EPHEDRINE SULFATE 10 MG: 50 INJECTION INTRAMUSCULAR; INTRAVENOUS; SUBCUTANEOUS at 08:28

## 2019-01-11 RX ADMIN — DOCUSATE SODIUM 100 MG: 100 CAPSULE ORAL at 13:17

## 2019-01-11 RX ADMIN — ROCURONIUM BROMIDE 40 MG: 10 INJECTION INTRAVENOUS at 08:37

## 2019-01-11 RX ADMIN — MIDAZOLAM HYDROCHLORIDE 2 MG: 1 INJECTION, SOLUTION INTRAMUSCULAR; INTRAVENOUS at 08:20

## 2019-01-11 RX ADMIN — SODIUM CHLORIDE, POTASSIUM CHLORIDE, SODIUM LACTATE AND CALCIUM CHLORIDE 9 ML/HR: 600; 310; 30; 20 INJECTION, SOLUTION INTRAVENOUS at 07:41

## 2019-01-11 RX ADMIN — SODIUM CHLORIDE 80 ML/HR: 9 INJECTION, SOLUTION INTRAVENOUS at 13:19

## 2019-01-11 RX ADMIN — OXYCODONE HYDROCHLORIDE AND ACETAMINOPHEN 1 TABLET: 5; 325 TABLET ORAL at 20:12

## 2019-01-11 RX ADMIN — SUFENTANIL CITRATE 15 MCG: 50 INJECTION, SOLUTION EPIDURAL; INTRAVENOUS at 08:14

## 2019-01-11 RX ADMIN — ONDANSETRON HYDROCHLORIDE 4 MG: 2 SOLUTION INTRAMUSCULAR; INTRAVENOUS at 09:17

## 2019-01-11 RX ADMIN — SUGAMMADEX 200 MG: 100 INJECTION, SOLUTION INTRAVENOUS at 09:39

## 2019-01-11 RX ADMIN — SODIUM CHLORIDE, POTASSIUM CHLORIDE, SODIUM LACTATE AND CALCIUM CHLORIDE 125 ML/HR: 600; 310; 30; 20 INJECTION, SOLUTION INTRAVENOUS at 03:57

## 2019-01-11 NOTE — PROGRESS NOTES
Rheumatology Progress Note     LOS: 1 day   Patient Care Team:  Provider, No Known as PCP - General    Chief Complaint:  Chest pain      Interval History: She had pericardial window this am. She feels much better. Breathing ok. No chest pain. Joints doing ok w steroids. Labs reviewed      Review of Systems:     CONSTITUTIONAL: negative for chills, fevers, night sweats, weight loss  RESPIRATORY: negative for cough, dyspnea on exertion, hemoptysis, shortness of breath, wheezing  CARDIOVASCULAR: negative for chest pain, chest pressure/discomfort, lower extremity edema, palpitations  GASTROINTESTINAL: negative for abdominal pain, constipation, diarrhea, nausea, vomiting  NEUROLOGICAL: negative for dizziness, headaches, numbness/tingling in extremities  MUSCULOSKELETAL: negative for joint pain, swelling, redness  SKIN: negative for rashes, lesions    Allergies: Patient has no known allergies.    Medication Review:   Current Facility-Administered Medications   Medication Dose Route Frequency Provider Last Rate Last Dose   • acetylcysteine (MUCOMYST) 20 % nebulizer solution 3 mL  3 mL Nebulization Q8H - RT Marcelino Dougherty MD       • bisacodyl (DULCOLAX) suppository 10 mg  10 mg Rectal Daily PRN Marcelino Dougherty MD       • ceFAZolin in 0.9% normal saline (ANCEF) IVPB solution 2 g  2 g Intravenous Q8H Marcelino Dougherty MD       • docusate sodium (COLACE) capsule 100 mg  100 mg Oral BID Marcelino Dougherty MD   100 mg at 01/11/19 1317   • [START ON 1/12/2019] enoxaparin (LOVENOX) syringe 30 mg  30 mg Subcutaneous Q12H Marcelino Dougherty MD       • ipratropium-albuterol (DUO-NEB) nebulizer solution 3 mL  3 mL Nebulization Q8H - RT Marcelino Dougherty MD   3 mL at 01/11/19 1431   • metoprolol tartrate (LOPRESSOR) tablet 12.5 mg  12.5 mg Oral Q12H Marcelino Dougherty MD   12.5 mg at 01/11/19 1316   • ondansetron (ZOFRAN) tablet 4 mg  4 mg Oral Q6H PRN Marcelino Dougherty MD        Or   • ondansetron ODT (ZOFRAN-ODT)  "disintegrating tablet 4 mg  4 mg Oral Q6H PRN Marcelino Dougherty MD        Or   • ondansetron (ZOFRAN) injection 4 mg  4 mg Intravenous Q6H PRN Marcelino Dougherty MD       • oxyCODONE-acetaminophen (PERCOCET) 5-325 MG per tablet 1 tablet  1 tablet Oral Q3H PRN Marcelino Dougherty MD       • polyethylene glycol 3350 powder (packet)  17 g Oral Daily Marcelino Dougherty MD       • Sodium chloride 0.9 % infusion  80 mL/hr Intravenous Continuous Marcelino Dougherty MD 80 mL/hr at 01/11/19 1319 80 mL/hr at 01/11/19 1319         Vital Signs  /94   Pulse 100   Temp 97.1 °F (36.2 °C) (Temporal)   Resp 15   Ht 162.6 cm (64\")   Wt 102 kg (225 lb 3.2 oz)   SpO2 94%   BMI 38.66 kg/m²     Physical Exam:    GENERAL:  Alert, cooperative, no distress  HEENT: no conjunctival injection, no oral ulcers, no cervical adenopathy  HEART: RR; no murmurs, gallops, or rubs  LUNGS: clear to auscultation bilaterally, no rales or wheezes  ABDOMEN: soft, nontender, nondistended, positive bowel sounds  EXTREMITIES: no edema, erythema  SKIN: no rashes or lesions  MSK: no synovitis, warmth joint effusion            Results Review:   Lab Results (last 24 hours)     Procedure Component Value Units Date/Time    Wound Culture - Wound, Pericardium [[id number]] Collected:  01/11/19 0832    Specimen:  Wound from Pericardium Updated:  01/11/19 1442     Gram Stain Few (2+) WBCs seen      No organisms seen    Tissue / Bone Culture - Tissue, Pericardium [[id number]] Collected:  01/11/19 0839    Specimen:  Tissue from Pericardium Updated:  01/11/19 1438     Gram Stain Moderate (3+) WBCs seen      No organisms seen    Body Fluid Culture - Body Fluid, Pericardium [[id number]] Collected:  01/11/19 0832    Specimen:  Body Fluid from Pericardium Updated:  01/11/19 1432     Gram Stain Many (4+) WBCs seen      No organisms seen    AFB Culture - Tissue, Pericardium [[id number]] Collected:  01/11/19 0839    Specimen:  Tissue from Pericardium Updated:  01/11/19 " 1423     AFB Stain No acid fast bacilli seen on direct smear      No acid fast bacilli seen on concentrated smear    AFB Culture - Body Fluid, Pericardium [[id number]] Collected:  01/11/19 0832    Specimen:  Body Fluid from Pericardium Updated:  01/11/19 1422     AFB Stain No acid fast bacilli seen on direct smear      No acid fast bacilli seen on concentrated smear    Basic Metabolic Panel [[id number]]  (Abnormal) Collected:  01/11/19 1035    Specimen:  Blood Updated:  01/11/19 1057     Glucose 138 mg/dL      BUN 9 mg/dL      Creatinine 0.45 mg/dL      Sodium 140 mmol/L      Potassium 4.1 mmol/L      Chloride 103 mmol/L      CO2 27.0 mmol/L      Calcium 7.9 mg/dL      eGFR  African Amer >150 mL/min/1.73      BUN/Creatinine Ratio 20.0     Anion Gap 10.0 mmol/L     Narrative:       GFR Normal >60  Chronic Kidney Disease <60  Kidney Failure <15    CBC (No Diff) [[id number]]  (Abnormal) Collected:  01/11/19 1035    Specimen:  Blood Updated:  01/11/19 1052     WBC 10.58 10*3/mm3      RBC 3.74 10*6/mm3      Hemoglobin 10.0 g/dL      Hematocrit 30.3 %      MCV 81.0 fL      MCH 26.7 pg      MCHC 33.0 g/dL      RDW 14.2 %      RDW-SD 41.9 fl      MPV 10.6 fL      Platelets 417 10*3/mm3     QuantiFERON TB Plus Client Incubated [[id number]] Collected:  01/11/19 0448    Specimen:  Blood Updated:  01/11/19 1033    Non-gynecologic Cytology [[id number]] Collected:  01/11/19 0857    Specimen:  Body Fluid from Pericardium Updated:  01/11/19 1026    Anaerobic Culture - Body Fluid, Pericardium [[id number]] Collected:  01/11/19 0832    Specimen:  Body Fluid from Pericardium Updated:  01/11/19 0942    Fungus Culture - Body Fluid, Pericardium [[id number]] Collected:  01/11/19 0832    Specimen:  Body Fluid from Pericardium Updated:  01/11/19 0942    Tissue Pathology Exam [[id number]] Collected:  01/11/19 0843    Specimen:  Tissue from Pericardium, Tissue from Pericardium, Tissue from Pericardium Updated:  01/11/19 0903    Anaerobic Culture -  Tissue, Pericardium [[id number]] Collected:  01/11/19 0839    Specimen:  Tissue from Pericardium Updated:  01/11/19 0858    Fungus Culture - Tissue, Pericardium [[id number]] Collected:  01/11/19 0839    Specimen:  Tissue from Pericardium Updated:  01/11/19 0858    Anaerobic Culture - Wound, Pericardium [[id number]] Collected:  01/11/19 0832    Specimen:  Wound from Pericardium Updated:  01/11/19 0856    Blood Culture - Blood, Hand, Right [[id number]] Collected:  01/10/19 1920    Specimen:  Blood from Hand, Right Updated:  01/11/19 0815     Blood Culture No growth at less than 24 hours    Blood Culture - Blood, Arm, Left [[id number]] Collected:  01/10/19 1920    Specimen:  Blood from Arm, Left Updated:  01/11/19 0815     Blood Culture No growth at less than 24 hours    HIV-1 & HIV-2 Antibodies [[id number]] Collected:  01/11/19 0448    Specimen:  Blood Updated:  01/11/19 0557    Narrative:       The following orders were created for panel order HIV-1 & HIV-2 Antibodies.  Procedure                               Abnormality         Status                     ---------                               -----------         ------                     HIV-1 & HIV-2 Antibodies[[id number]]     Normal              Final result                 Please view results for these tests on the individual orders.    HIV-1 & HIV-2 Antibodies [[id number]]  (Normal) Collected:  01/11/19 0448    Specimen:  Blood Updated:  01/11/19 0557     HIV-1/ HIV-2 Negative    Basic Metabolic Panel [[id number]]  (Abnormal) Collected:  01/11/19 0448    Specimen:  Blood Updated:  01/11/19 0512     Glucose 141 mg/dL      BUN 8 mg/dL      Creatinine 0.54 mg/dL      Sodium 140 mmol/L      Potassium 3.9 mmol/L      Chloride 103 mmol/L      CO2 28.0 mmol/L      Calcium 7.8 mg/dL      eGFR  African Amer >150 mL/min/1.73      BUN/Creatinine Ratio 14.8     Anion Gap 9.0 mmol/L     Narrative:       GFR Normal >60  Chronic Kidney Disease <60  Kidney Failure <15    CBC (No Diff)  [[id number]]  (Abnormal) Collected:  01/11/19 0448    Specimen:  Blood Updated:  01/11/19 0501     WBC 9.40 10*3/mm3      RBC 2.93 10*6/mm3      Hemoglobin 7.9 g/dL      Hematocrit 23.8 %      MCV 81.2 fL      MCH 27.0 pg      MCHC 33.2 g/dL      RDW 14.3 %      RDW-SD 42.5 fl      MPV 10.4 fL      Platelets 370 10*3/mm3     Urinalysis With Culture If Indicated - Urine, Catheter [[id number]]  (Abnormal) Collected:  01/11/19 0055    Specimen:  Urine, Catheter Updated:  01/11/19 0222     Color, UA Dark Yellow     Appearance, UA Clear     pH, UA 5.5     Specific Gravity, UA 1.015     Glucose, UA Negative     Ketones, UA Negative     Bilirubin, UA Negative     Blood, UA Negative     Protein, UA Trace     Leuk Esterase, UA Trace     Nitrite, UA Negative     Urobilinogen, UA 1.0 E.U./dL    Urinalysis, Microscopic Only - Urine, Catheter [[id number]]  (Abnormal) Collected:  01/11/19 0055    Specimen:  Urine, Catheter Updated:  01/11/19 0222     RBC, UA 0-2 /HPF      WBC, UA 3-5 /HPF      Bacteria, UA 1+ /HPF      Squamous Epithelial Cells, UA 3-6 /HPF      Hyaline Casts, UA 0-2 /LPF      Methodology Manual Light Microscopy    Hepatitis Panel, Acute [[id number]]  (Normal) Collected:  01/10/19 1920    Specimen:  Blood Updated:  01/10/19 2221     HCV S/C Ratio 0.05     Hepatitis C Ab Negative     Hep A IgM Negative     Hep B C IgM Negative     Hepatitis B Surface Ag Negative    Rheumatoid Factor [[id number]]  (Abnormal) Collected:  01/10/19 1920    Specimen:  Blood Updated:  01/10/19 2101     Rheumatoid Factor Quantitative 185.1 IU/mL     TSH [[id number]]  (Normal) Collected:  01/10/19 1920    Specimen:  Blood Updated:  01/10/19 2054     TSH 1.380 mIU/mL     Procalcitonin [[id number]]  (Normal) Collected:  01/10/19 1921    Specimen:  Blood Updated:  01/10/19 2038     Procalcitonin <0.25 ng/mL     Narrative:       SIRS, sepsis, severe sepsis, and septic shock are categorized according to the criteria of the consensus conference of the  American College of Chest Physicians/Society of Critical Care Medicine.    PCT < 0.5 ng/mL     Systemic infection (sepsis) is not likely.    PCT >0.5 and < 2.0 ng/mL Systemic infection (sepsis) is possible, but other conditions are known to elevate PCT as well.    PCT > 2.0 ng/mL     Systemic infection (sepsis) is likely, unless other causes are known.      PCT > 10.0 ng/mL    Important systemic inflammatory response, almost exclusively due to severe bacterial sepsis or septic shock.    PCT values of < 0.5 ng/mL do not exclude an infection, because localized infections (without systemic signs) may be associated with such low concentrations, or a systemic infection in its initial stages (<6 hours).  Increased PCT can occur without infection.  PCT concentrations between 0.5 and 2.0 ng/mL should be interpreted taking into account the patients history.  It is recommended to retest PCT within 6-24 hours if any concentrations < 2.0 ng/mL are obtained.    C-reactive Protein [[id number]]  (Abnormal) Collected:  01/10/19 1921    Specimen:  Blood Updated:  01/10/19 2036     C-Reactive Protein 23.88 mg/dL     Troponin [[id number]]  (Normal) Collected:  01/10/19 1921    Specimen:  Blood Updated:  01/10/19 2034     Troponin I <0.012 ng/mL     Comprehensive Metabolic Panel [[id number]]  (Abnormal) Collected:  01/10/19 1921    Specimen:  Blood Updated:  01/10/19 2026     Glucose 100 mg/dL      BUN 7 mg/dL      Creatinine 0.68 mg/dL      Sodium 140 mmol/L      Potassium 3.2 mmol/L      Chloride 99 mmol/L      CO2 29.0 mmol/L      Calcium 7.7 mg/dL      Total Protein 6.6 g/dL      Albumin 3.00 g/dL      ALT (SGPT) 53 U/L      AST (SGOT) 35 U/L      Alkaline Phosphatase 83 U/L      Total Bilirubin 0.7 mg/dL      eGFR   Amer 124 mL/min/1.73      Globulin 3.6 gm/dL      A/G Ratio 0.8 g/dL      BUN/Creatinine Ratio 10.3     Anion Gap 12.0 mmol/L     CK [[id number]]  (Normal) Collected:  01/10/19 1921    Specimen:  Blood Updated:   01/10/19 2026     Creatine Kinase 58 U/L     Lactic Acid, Plasma [[id number]]  (Normal) Collected:  01/10/19 1920    Specimen:  Blood Updated:  01/10/19 2022     Lactate 1.1 mmol/L     Protime-INR [[id number]]  (Abnormal) Collected:  01/10/19 1920    Specimen:  Blood Updated:  01/10/19 2020     Protime 17.6 Seconds      INR 1.40    aPTT [[id number]]  (Abnormal) Collected:  01/10/19 1920    Specimen:  Blood Updated:  01/10/19 2020     PTT 38.8 seconds     Sedimentation Rate [[id number]]  (Abnormal) Collected:  01/10/19 1920    Specimen:  Blood Updated:  01/10/19 2019     Sed Rate 46 mm/hr     CBC Auto Differential [[id number]]  (Abnormal) Collected:  01/10/19 1920    Specimen:  Blood Updated:  01/10/19 2012     WBC 10.57 10*3/mm3      RBC 3.09 10*6/mm3      Hemoglobin 8.4 g/dL      Hematocrit 25.7 %      MCV 83.2 fL      MCH 27.2 pg      MCHC 32.7 g/dL      RDW 14.5 %      RDW-SD 43.8 fl      MPV 10.9 fL      Platelets 444 10*3/mm3      Neutrophil % 74.4 %      Lymphocyte % 10.8 %      Monocyte % 11.9 %      Eosinophil % 2.3 %      Basophil % 0.2 %      Immature Grans % 0.4 %      Neutrophils, Absolute 7.87 10*3/mm3      Lymphocytes, Absolute 1.14 10*3/mm3      Monocytes, Absolute 1.26 10*3/mm3      Eosinophils, Absolute 0.24 10*3/mm3      Basophils, Absolute 0.02 10*3/mm3      Immature Grans, Absolute 0.04 10*3/mm3      nRBC 0.0 /100 WBC     C3 Complement [[id number]] Collected:  01/10/19 1920    Specimen:  Blood Updated:  01/10/19 2005    Cyclic Citrul Peptide Antibody, IgG / IgA [[id number]] Collected:  01/10/19 1920    Specimen:  Blood Updated:  01/10/19 2005    IgE [[id number]] Collected:  01/10/19 1920    Specimen:  Blood Updated:  01/10/19 2005    Nuclear Antigen Antibody, IFA [[id number]] Collected:  01/10/19 1920    Specimen:  Blood Updated:  01/10/19 2005    ANCA Panel [[id number]] Collected:  01/10/19 1920    Specimen:  Blood Updated:  01/10/19 2005    AMBERLY Comprehensive Plus Profile [[id number]] Collected:  01/10/19  1920    Specimen:  Blood Updated:  01/10/19 2004    C4 Complement [[id number]] Collected:  01/10/19 1920    Specimen:  Blood Updated:  01/10/19 2004        Imaging Results (last 72 hours)     Procedure Component Value Units Date/Time    XR Chest 1 View [[id number]] Collected:  01/11/19 1040     Updated:  01/11/19 1058    Narrative:       EXAMINATION:   XR CHEST 1 -  1/11/2019 10:40 AM CST     HISTORY: Paracardial drain     Frontal upright radiograph of the chest 1/11/2019 10:28 AM CST     COMPARISON: January 10, 2019.     FINDINGS:   The right lung is clear. A moderate left pleural effusion is present..  The cardiac silhouettes moderately enlarged.     PeriCardial drains are present..      The osseous structures and surrounding soft tissues demonstrate no acute  abnormality.       Impression:       1. 2 drains are noted. These are pericardial drains     Moderate left pleural effusion.        This report was finalized on 01/11/2019 10:55 by Dr. Honorio Garay MD.    XR Chest 1 View [[id number]] Collected:  01/10/19 2132     Updated:  01/10/19 2136    Narrative:       EXAMINATION:  XR CHEST 1 -  1/10/2019 9:27 PM CST     HISTORY: I31.4-Cardiac tamponade; R64-Aevcvzx effusion, not elsewhere  classified; I30.9-Acute pericarditis, unspecified; M06.9-Rheumatoid  arthritis, unspecified; M06.9-Rheumatoid arthritis, unspecified     COMPARISON: No comparison study.     FINDINGS:  There is very poor inspiration. There is dense infiltrate at  the left lung base. The left hemidiaphragm is obscured. There is patchy  infiltrate at the right lung base. There is at least moderate  cardiomegaly. The left heart border is obscured by the infiltrate on the  left.       Impression:       1. Poor inspiration.  2. Dense infiltrate on the left. The left hemidiaphragm is obscured. The  left heart border is obscured. This may represent pneumonia and/or  pleural effusion.  3. Mild patchy infiltrate at the right base.  4. Heart size is  difficult to evaluate due to the infiltrate on the  left. I suspect there is at least moderate cardiomegaly.        This report was finalized on 01/10/2019 21:33 by Dr. José Miguel Busby MD.              Assessment/Plan       Pleural/pericardial effusion, RA, post partum states  -Will await fluid studies, appears gram stains neg at this point so infection less likely.  -RF pos c/w known disease, await AMBERLY profile. If no other explanation, RA-related may be best explanation  -Will re-order colchicine. Would favor at least a low dose of steroid at this time given the gram stains are negative      Johann Bray MD  01/11/19  5:28 PM

## 2019-01-11 NOTE — PROGRESS NOTES
[facility] Medicine Services  INPATIENT PROGRESS NOTE    Patient Name: Sheeba Downs  Date of Admission: 1/10/2019  Today's Date: 01/11/19  Length of Stay: 1  Primary Care Physician: Provider, No Known    Subjective   Chief Complaint: follow-up pericardial window  HPI   Patient is just returned from pericardial window.  Case was discussed with Dr. Dougherty earlier this morning.  Her cardial fluid studies have been sent.  Patient reports that she feels a burning sensation in the center of the chest near the site of the chest tubes are in place.  She does have some pain with deep inspiration.  She has been titrated up to 5 L by nasal cannula postoperatively and in the intensive care unit.  Feels like she breaths better when sitting up.    Review of Systems     All pertinent negatives and positives are as above. All other systems have been reviewed and are negative unless otherwise stated.     Objective    Temp:  [97 °F (36.1 °C)-99.7 °F (37.6 °C)] 97 °F (36.1 °C)  Heart Rate:  [] 90  Resp:  [14-28] 24  BP: (131-150)/() 137/105  Arterial Line BP: (100-157)/(65-99) 102/94  Physical Exam   Constitutional: She is oriented to person, place, and time. No distress.   Currently on 5LNC   HENT:   Head: Normocephalic.   Mouth/Throat: No oropharyngeal exudate.   Eyes: No scleral icterus.   Neck: No tracheal deviation present.   Cardiovascular: Normal rate. Exam reveals no friction rub.   Chest tubes (subxiphoid)   Pulmonary/Chest: Effort normal. No respiratory distress.   Diminished BSs at bases; no wheezes or rales   Genitourinary:   Genitourinary Comments: Velez now in place   Musculoskeletal: She exhibits no edema.   Neurological: She is alert and oriented to person, place, and time.   Skin: Skin is warm and dry. She is not diaphoretic.   Psychiatric: She has a normal mood and affect. Her behavior is normal.   Vitals reviewed.      Results Review:  I have reviewed the labs,  radiology results, and diagnostic studies.    Laboratory Data:   Results from last 7 days   Lab Units  01/11/19   1035  01/11/19   0448  01/10/19   1920   WBC 10*3/mm3  10.58  9.40  10.57   HEMOGLOBIN g/dL  10.0*  7.9*  8.4*   HEMATOCRIT %  30.3*  23.8*  25.7*   PLATELETS 10*3/mm3  417*  370  444*        Results from last 7 days   Lab Units  01/11/19   1035  01/11/19   0448  01/10/19   1921   SODIUM mmol/L  140  140  140   POTASSIUM mmol/L  4.1  3.9  3.2*   CHLORIDE mmol/L  103  103  99   CO2 mmol/L  27.0  28.0  29.0   BUN mg/dL  9  8  7   CREATININE mg/dL  0.45*  0.54  0.68   CALCIUM mg/dL  7.9*  7.8*  7.7*   BILIRUBIN mg/dL   --    --   0.7   ALK PHOS U/L   --    --   83   ALT (SGPT) U/L   --    --   53   AST (SGOT) U/L   --    --   35   GLUCOSE mg/dL  138*  141*  100       Culture Data:   Blood Culture   Date Value Ref Range Status   01/10/2019 No growth at less than 24 hours  Preliminary   01/10/2019 No growth at less than 24 hours  Preliminary       Radiology Data:   Imaging Results (last 24 hours)     Procedure Component Value Units Date/Time    XR Chest 1 View [[id number]] Collected:  01/11/19 1040     Updated:  01/11/19 1058    Narrative:       EXAMINATION:   XR CHEST 1 VW-  1/11/2019 10:40 AM CST     HISTORY: Paracardial drain     Frontal upright radiograph of the chest 1/11/2019 10:28 AM CST     COMPARISON: January 10, 2019.     FINDINGS:   The right lung is clear. A moderate left pleural effusion is present..  The cardiac silhouettes moderately enlarged.     PeriCardial drains are present..      The osseous structures and surrounding soft tissues demonstrate no acute  abnormality.       Impression:       1. 2 drains are noted. These are pericardial drains     Moderate left pleural effusion.        This report was finalized on 01/11/2019 10:55 by Dr. Honorio Garay MD.    XR Chest 1 View [[id number]] Collected:  01/10/19 2132     Updated:  01/10/19 2136    Narrative:       EXAMINATION:  XR CHEST 1 VW-   1/10/2019 9:27 PM CST     HISTORY: I31.4-Cardiac tamponade; X34-Nodngef effusion, not elsewhere  classified; I30.9-Acute pericarditis, unspecified; M06.9-Rheumatoid  arthritis, unspecified; M06.9-Rheumatoid arthritis, unspecified     COMPARISON: No comparison study.     FINDINGS:  There is very poor inspiration. There is dense infiltrate at  the left lung base. The left hemidiaphragm is obscured. There is patchy  infiltrate at the right lung base. There is at least moderate  cardiomegaly. The left heart border is obscured by the infiltrate on the  left.       Impression:       1. Poor inspiration.  2. Dense infiltrate on the left. The left hemidiaphragm is obscured. The  left heart border is obscured. This may represent pneumonia and/or  pleural effusion.  3. Mild patchy infiltrate at the right base.  4. Heart size is difficult to evaluate due to the infiltrate on the  left. I suspect there is at least moderate cardiomegaly.        This report was finalized on 01/10/2019 21:33 by Dr. José Miguel Busby MD.          I have reviewed the patient's current medications.     Assessment/Plan     Active Hospital Problems    Diagnosis   • Acute hypoxemic respiratory failure (CMS/HCC)   • Cardiac tamponade   • Pleural effusion   • Sinus tachycardia   • Pericarditis   • Rheumatoid arthritis (CMS/HCC)   • Sickle cell trait (CMS/HCC)   • Chest pain   • Acute pericarditis associated with rheumatoid arthritis (CMS/HCC)     Added automatically from request for surgery [id number]       Plan:  1.  Discussed with Dr. Dougherty this morning  2.  Patient s/p pericardial window this AM  3.  Follow-up pericardial fluid studies including cell count, culture, pathology, and cytology  4.  Incentive spirometry  5.  Supp 02  6.  AMBERLY, ANCA panel, complement pending  7.  Received IV Solumedrol 125mg X 1 earlier this AM  8.  Lovenox at PPx dose  9.  Repeat CXR in AM  10.  Plan to d/c Rosie tomorrow  11.  PO Percocet PRN pain  12.  Appreciate  consultants  13.  Workup continues; patient's 29th birthday is TODAY!    Galen Pandey MD   01/11/19   12:42 PM

## 2019-01-11 NOTE — ANESTHESIA POSTPROCEDURE EVALUATION
"Patient: Sheeba Downs    Procedure Summary     Date:  01/11/19 Room / Location:   [facility]  /  [facility]    Anesthesia Start:  0755 Anesthesia Stop:  0951    Procedure:  SUBXIPHOID PERICARDIAL WINDOW (N/A Chest) Diagnosis:       Cardiac tamponade      Pleural effusion      Acute pericarditis associated with rheumatoid arthritis (CMS/HCC)      Rheumatoid arthritis, involving unspecified site, unspecified rheumatoid factor presence (CMS/HCC)      (Cardiac tamponade [I31.4])      (Pleural effusion [J90])      (Acute pericarditis associated with rheumatoid arthritis (CMS/HCC) [I30.9, M06.9])      (Rheumatoid arthritis, involving unspecified site, unspecified rheumatoid factor presence (CMS/HCC) [M06.9])    Surgeon:  Marcelino Dougherty MD Provider:  Brando Kumari CRNA    Anesthesia Type:  general ASA Status:  3          Anesthesia Type: general  Last vitals  BP   (!) 137/105 (01/11/19 1200)   Temp   97 °F (36.1 °C) (01/11/19 1104)   Pulse   90 (01/11/19 1200)   Resp   24 (01/11/19 1200)     SpO2   90 % (01/11/19 1200)     Post Anesthesia Care and Evaluation    PONV Status: none  Comments: Patient d/c from PACU prior to anes eval based on Junior score.  Please see RN notes for details of d/c criteria.    Blood pressure (!) 137/105, pulse 90, temperature 97 °F (36.1 °C), resp. rate 24, height 162.6 cm (64\"), weight 102 kg (225 lb 3.2 oz), SpO2 90 %.          "

## 2019-01-11 NOTE — PAYOR COMM NOTE
"FROM: ROX BOYD  PHONE: [phone]  FAX: [phone]    PENDIN-[id number]    Yakelin Guidry (29 y.o. Female)     Date of Birth Social Security Number Address Home Phone MRN    [id number]  [id number] [facility] [id number] [phone] [id number]    Denominational Marital Status          Other        Admission Date Admission Type Admitting Provider Attending Provider Department, Room/Bed    1/10/19 Urgent Glaen Pandey MD Thompson, Benjamin H, MD [facility] CARDIAC CARE, C002/    Discharge Date Discharge Disposition Discharge Destination                       Attending Provider:  Galen Pandey MD    Allergies:  No Known Allergies    Isolation:  None   Infection:  None   Code Status:  CPR    Ht:  162.6 cm (64\")   Wt:  102 kg (225 lb 3.2 oz)    Admission Cmt:  None   Principal Problem:  None                Active Insurance as of 1/10/2019     Primary Coverage     Payor Plan Insurance Group Employer/Plan Group    [facility]      Payor Plan Address Payor Plan Phone Number Payor Plan Fax Number Effective Dates    [address]   1/10/2019 - None Entered    [facility] [id number]       Subscriber Name Subscriber Birth Date Member ID       YAKELIN GUIDRY 1990 [id number]                 Emergency Contacts      (Rel.) Home Phone Work Phone Mobile Phone    WELLINGTON GUIDRY (Spouse) [phone] -- --               History & Physical      Galen Pandey MD at 1/10/2019  6:54 PM              [facility] Medicine Services  HISTORY AND PHYSICAL    Date of Admission: 1/10/2019  Primary Care Physician: Provider, No Known    Subjective     Chief Complaint: Transfer from OSH due to pericardial and pleural effusion; 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  Unfortunately, she began to experience some symptoms of chest pain and was evaluated at an outside hospital.  She was diagnosed with pericarditis, and was started on treatment with ibuprofen and colchicine reports that her symptoms of chest pain improved.  When those medications were stopped, she reported worsening symptoms of chest pain, and now symptoms of shortness of breath which prompted her to go to the outside hospital for a repeat assessment.  Imaging was performed which revealed evidence of a new moderate left pleural effusion with underlying compressive atelectasis, an echocardiogram was also performed revealing evidence of a large pericardial effusion with evidence of temp and not physiology.  Ejection fraction was noted to be 60-65% that time.  Patient currently reports that she has a sensation of fullness in her chest, which is worse when she sits up and leans forward.  She can find some comfort when she is laying down on her left side.  She has not had any fevers or chills.  She denies any significant cough or congestion.  She has never had similar symptoms like this in the past.  She has been on outpatient therapy with Plaquenil for a diagnosis of rheumatoid arthritis which per her report is been reasonably well-controlled.  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  She was transferred to our facility today for higher level of care.    Review of Systems     Otherwise complete ROS reviewed and negative except as mentioned in the HPI.    Past Medical History: History of Churg-Christianne.  Rheumatoid arthritis on outpatient  therapy with Plaquenil, sickle cell trait    Past Surgical History:  Has had  ×2    Social History:  denies any history of tobacco or illicit drug use.  Drinks alcohol occasionally but not often.    Family History: Family medical history for rheumatoid arthritis    Allergies:  Allergies no known allergies  Medications:  Prior to Admission medications    Not on File   Patient reports that the only outpatient medication she normally takes his Plaquenil; recently she is also been on colchicine and ibuprofen.  Objective     Vital Signs: There were no vitals taken for this visit.  Physical Exam   Constitutional: She is oriented to person, place, and time. She appears well-developed and well-nourished. No distress.   HR currently 110-120; normotensive; requiring no supplemental 02   HENT:   Head: Normocephalic.   Mouth/Throat: No oropharyngeal exudate.   Eyes: No scleral icterus.   Neck: No tracheal deviation present.   Cardiovascular: Regular rhythm. Exam reveals no friction rub.   Tachycardic; regular; cannot appreciate a rub   Pulmonary/Chest: Effort normal. No respiratory distress.   Diminished BSs at bases (L>R); no accessory muscle use; no work of breathing   Abdominal: Soft.   Musculoskeletal: She exhibits no edema.   Neurological: She is alert and oriented to person, place, and time.   Skin: Skin is warm and dry. Capillary refill takes less than 2 seconds. She is not diaphoretic.   Psychiatric: She has a normal mood and affect. Her behavior is normal.     Results Reviewed:  Lab Results (last 24 hours)     ** No results found for the last 24 hours. **        Imaging Results (last 24 hours)     ** No results found for the last 24 hours. **        Chest x-ray from the outside hospital revealed moderate to large left pleural effusion and small right pleural effusion both appear increased compared to previous examination on 2018.  Heart appears mildly prominent.  Bibasilar opacities which may reflect  atelectasis versus infectious process in the appropriate clinical setting.    Echocardiogram performed at the outside hospital reveals a large pericardial effusion.  The fluid is mostly circumferential but only minimal fluid at the apex.  There is a large effusion around the right atrium.  There is some fibrin stranding noted.  There is also a concomitant pleural effusion.  The IVC is somewhat dilated with blunted inspiratory SNIF.  Plans are consistent with a large effusion with evidence of [place] not physiology.  Of note patient was not in any distress during this examination.  Her ejection fraction was reported at 60-65%.    Assessment / Plan     Assessment:   Active Hospital Problems    Diagnosis   • Cardiac tamponade   • Pleural effusion   • Sinus tachycardia   • Pericarditis   • Rheumatoid arthritis (CMS/HCC)   • Sickle cell trait (CMS/HCC)   • Chest pain     Plan:   1.  Case discussed with Dr. Bray and Dr. Dougherty this PM  2.  OSH Echocardiogram report reviewed; tamponade physiology noted but patient in no acute distress, resting comfortably, normotensive; sinus tachycardia note with rate approx 110  3.  IVFs with LR  4.  CXR  5.  EKG  6.  CBC, CMP, ESR, CRP, AMBERLY panel, ANCA panel, complement, lactic acid, procalcitonin, troponin, TSH; ultimately would like to get pleural vs. pericardial fluid for further analysis  7.  Trial of steroid  8.  Trial of Ibuprofen and colchicine  9.  SCDs  10.  Telemetry  11.  May require arterial line to assess pulsus paradoxus  12.  ICU admission; workup ongoing    Galen Pandey MD   01/10/19   7:08 PM            Electronically signed by Galen aPndey MD at 1/10/2019  7:15 PM       ICU Vital Signs     Row Name 01/11/19 1200 01/11/19 1104 01/11/19 1049 01/11/19 1034 01/11/19 1019       Vitals    Temp  --  97 °F (36.1 °C)  --  --  --    Pulse  90  91  93  92  100    Resp  24  18  21  20  18    BP  137/105  (Abnormal)   131/94  133/96  140/97  140/100     Noninvasive MAP (mmHg)  114  103  105  107  108       Oxygen Therapy    SpO2  90 %  90 %  89 %  (Abnormal)   91 %  --    Flow (L/min)  4  --  --  --  --       Art Line    Arterial Line BP  102/94  106/99  129/88  --  117/92    Arterial Line MAP (mmHg)  98 mmHg  102 mmHg  258 mmHg  190 mmHg  104 mmHg    Row Name 01/11/19 1009 01/11/19 1004 01/11/19 0959 01/11/19 0954 01/11/19 0949       Vitals    Temp  --  --  --  --  97 °F (36.1 °C)    Temp src  --  --  --  --  Temporal    Pulse  105  101  105  109  105    Heart Rate Source  --  --  --  --  Monitor    Resp  20  14  16  18  18    Resp Rate Source  --  --  --  --  Visual    BP  136/105  (Abnormal)   139/101  (Abnormal)   131/102  (Abnormal)   131/102  (Abnormal)   138/99    Noninvasive MAP (mmHg)  112  108  109  109  106    BP Location  --  --  --  --  Right arm    BP Method  --  --  --  --  Automatic    Patient Position  --  --  --  --  Lying       Oxygen Therapy    SpO2  --  95 %  --  97 %  96 %    Pulse Oximetry Type  --  --  --  --  Continuous    Device (Oxygen Therapy)  --  --  --  --  simple face mask    Flow (L/min)  --  --  --  --  8       Art Line    Arterial Line BP  113/95  103/90  --  --  --    Arterial Line MAP (mmHg)  104 mmHg  97 mmHg  --  --  --    Row Name 01/11/19 0730 01/11/19 0605 01/11/19 0600 01/11/19 0550 01/11/19 0530       Height and Weight    Weight  --  --  --  102 kg (225 lb 3.2 oz)  --    Weight Method  --  --  --  Bed scale  --       Vitals    Pulse  85  --  68  --  82    Heart Rate Source  Monitor  --  --  --  --    Resp  --  23  --  --  --       Oxygen Therapy    SpO2  93 %  --  92 %  --  95 %    Pulse Oximetry Type  Continuous  --  --  --  --    Device (Oxygen Therapy)  room air  --  --  --  --       Art Line    Arterial Line BP  --  --  119/71  --  141/89    Arterial Line MAP (mmHg)  --  --  92 mmHg  --  112 mmHg    Row Name 01/11/19 0515 01/11/19 0500 01/11/19 0445 01/11/19 0430 01/11/19 0415       Vitals    Pulse  85  72  73  73  73     Resp  --  28  --  --  --       Oxygen Therapy    SpO2  97 %  92 %  92 %  92 %  92 %       Art Line    Arterial Line BP  140/88  113/70  126/96  108/66  123/74    Arterial Line MAP (mmHg)  111 mmHg  90 mmHg  109 mmHg  85 mmHg  94 mmHg    Row Name 01/11/19 0405 01/11/19 0400 01/11/19 0359 01/11/19 0320 01/11/19 0310       Vitals    Temp  --  --  97.4 °F (36.3 °C)  --  --    Temp src  --  --  Oral  --  --    Pulse  --  --  --  76  78    Resp  --  23  --  --  --       Oxygen Therapy    SpO2  95 %  --  --  92 %  91 %       Art Line    Arterial Line BP  143/90  --  --  102/79  100/72    Arterial Line MAP (mmHg)  113 mmHg  --  --  92 mmHg  87 mmHg    Row Name 01/11/19 0300 01/11/19 0245 01/11/19 0240 01/11/19 0215 01/11/19 0205       Vitals    Pulse  84  81  82  79  --    Resp  23  --  --  --  --    BP  136/98  --  133/93  --  --    Noninvasive MAP (mmHg)  110  --  105  --  --       Oxygen Therapy    SpO2  93 %  92 %  91 %  93 %  94 %       Art Line    Arterial Line BP  132/82  104/65  105/66  113/68  134/82    Arterial Line MAP (mmHg)  103 mmHg  82 mmHg  83 mmHg  86 mmHg  102 mmHg    Row Name 01/11/19 0200 01/11/19 0145 01/11/19 0130 01/11/19 0115 01/11/19 0100       Vitals    Pulse  86  86  85  98  101    Resp  27  --  --  --  20    BP  --  --  --  --  147/97    Noninvasive MAP (mmHg)  --  --  --  --  109       Oxygen Therapy    SpO2  92 %  92 %  92 %  91 %  92 %       Art Line    Arterial Line BP  109/68  107/67  119/70  131/77  129/77    Arterial Line MAP (mmHg)  85 mmHg  84 mmHg  89 mmHg  96 mmHg  95 mmHg    Row Name 01/11/19 0035 01/11/19 0030 01/11/19 0015 01/11/19 0005 01/11/19 0000       Vitals    Temp  --  --  --  98.4 °F (36.9 °C)  --    Temp src  --  --  --  Axillary  --    Pulse  91  92  89  --  97    Resp  --  --  --  25  --    BP  150/95  --  --  --  --    Noninvasive MAP (mmHg)  110  --  --  --  --       Oxygen Therapy    SpO2  92 %  92 %  93 %  --  91 %       Art Line    Arterial Line BP  116/70  107/65  " 120/69  --  104/68    Arterial Line MAP (mmHg)  89 mmHg  82 mmHg  88 mmHg  --  83 mmHg    Row Name 01/10/19 2345 01/10/19 2330 01/10/19 2315 01/10/19 2300 01/10/19 2245       Vitals    Pulse  101  98  95  99  108    Resp  --  --  --  20  --       Oxygen Therapy    SpO2  91 %  90 %  91 %  90 %  91 %       Art Line    Arterial Line BP  121/71  112/72  121/70  143/78  119/71    Arterial Line MAP (mmHg)  88 mmHg  87 mmHg  88 mmHg  100 mmHg  90 mmHg    Row Name 01/10/19 2230 01/10/19 2215 01/10/19 2200 01/10/19 2145 01/10/19 2130       Vitals    Pulse  116  108  108  107  110    Resp  --  --  21  --  --       Oxygen Therapy    SpO2  91 %  91 %  91 %  93 %  95 %       Art Line    Arterial Line BP  141/85  126/72  136/75  144/80  147/80    Arterial Line MAP (mmHg)  104 mmHg  90 mmHg  94 mmHg  99 mmHg  101 mmHg    Row Name 01/10/19 2115 01/10/19 2100 01/10/19 2045 01/10/19 2030 01/10/19 2025       Vitals    Pulse  115  113  113  112  113    Heart Rate Source  --  --  --  --  Monitor    Resp  --  23  --  --  --       Oxygen Therapy    SpO2  96 %  95 %  96 %  98 %  97 %    Pulse Oximetry Type  --  --  --  --  Continuous    Device (Oxygen Therapy)  --  --  --  --  room air       Art Line    Arterial Line BP  151/80  157/85  148/84  151/83  --    Arterial Line MAP (mmHg)  100 mmHg  106 mmHg  103 mmHg  103 mmHg  --    Row Name 01/10/19 2010 01/10/19 2005 01/10/19 2000 01/10/19 1928 01/10/19 1900       Height and Weight    Height  --  --  --  --  162.6 cm (64\")    Height Method  --  --  --  --  Actual    Weight  --  --  --  --  103 kg (226 lb 4 oz)    Weight Method  --  --  --  --  Bed scale    Ideal Body Weight (IBW) (kg)  --  --  --  --  55    BSA (Calculated - sq m)  --  --  --  --  2.06 sq meters    BMI (Calculated)  --  --  --  --  38.8    Weight in (lb) to have BMI = 25  --  --  --  --  145.3       Vitals    Temp  --  --  99.7 °F (37.6 °C)  --  98.6 °F (37 °C)    Temp src  --  --  Oral  --  Oral    Pulse  113  115  --  --  " --    Resp  --  24  --  --  --       Oxygen Therapy    SpO2  98 %  98 %  --  --  --    Device (Oxygen Therapy)  --  --  room air  room air  --       Art Line    Arterial Line BP  130/74  133/78  --  --  --    Arterial Line MAP (mmHg)  93 mmHg  95 mmHg  --  --  --    Row Name 01/10/19 1835 01/10/19 1820                Vitals    Pulse  118  117       BP  137/84  --       Noninvasive MAP (mmHg)  100  --          Oxygen Therapy    SpO2  94 %  94 %           Hospital Medications (all)       Dose Frequency Start End    acetylcysteine (MUCOMYST) 20 % nebulizer solution 3 mL 3 mL Every 8 Hours - RT 1/11/2019 1/13/2019    Sig - Route: Take 3 mL by nebulization Every 8 (Eight) Hours. - Nebulization    bisacodyl (DULCOLAX) suppository 10 mg 10 mg Daily PRN 1/11/2019     Sig - Route: Insert 1 suppository into the rectum Daily As Needed for Constipation. - Rectal    buffered lidocaine syringe 0.5 mL 0.5 mL Once As Needed 1/11/2019 1/11/2019    Sig - Route: Inject 0.5 mL as directed 1 (One) Time As Needed (IV Start). - Injection    ceFAZolin in 0.9% normal saline (ANCEF) IVPB solution 2 g 2 g Every 8 Hours 1/11/2019 1/12/2019    Sig - Route: Infuse 100 mL into a venous catheter Every 8 (Eight) Hours. - Intravenous    docusate sodium (COLACE) capsule 100 mg 100 mg 2 Times Daily 1/11/2019     Sig - Route: Take 1 capsule by mouth 2 (Two) Times a Day. - Oral    enoxaparin (LOVENOX) syringe 30 mg 30 mg Every 12 Hours Scheduled 1/12/2019     Sig - Route: Inject 0.3 mL under the skin into the appropriate area as directed Every 12 (Twelve) Hours. - Subcutaneous    ipratropium-albuterol (DUO-NEB) nebulizer solution 3 mL 3 mL Every 8 Hours - RT 1/11/2019 1/13/2019    Sig - Route: Take 3 mL by nebulization Every 8 (Eight) Hours. - Nebulization    methylPREDNISolone sodium succinate (SOLU-Medrol) injection 125 mg 125 mg Once 1/11/2019 1/11/2019    Sig - Route: Infuse 2 mL into a venous catheter 1 (One) Time. - Intravenous    metoprolol  "tartrate (LOPRESSOR) tablet 12.5 mg 12.5 mg Every 12 Hours Scheduled 1/11/2019     Sig - Route: Take 0.5 tablets by mouth Every 12 (Twelve) Hours. - Oral    ondansetron (ZOFRAN) injection 4 mg 4 mg Every 6 Hours PRN 1/11/2019     Sig - Route: Infuse 2 mL into a venous catheter Every 6 (Six) Hours As Needed for Nausea or Vomiting. - Intravenous    Linked Group 1:  \"Or\" Linked Group Details        ondansetron (ZOFRAN) tablet 4 mg 4 mg Every 6 Hours PRN 1/11/2019     Sig - Route: Take 1 tablet by mouth Every 6 (Six) Hours As Needed for Nausea or Vomiting. - Oral    Linked Group 1:  \"Or\" Linked Group Details        ondansetron ODT (ZOFRAN-ODT) disintegrating tablet 4 mg 4 mg Every 6 Hours PRN 1/11/2019     Sig - Route: Take 1 tablet by mouth Every 6 (Six) Hours As Needed for Nausea or Vomiting. - Oral    Linked Group 1:  \"Or\" Linked Group Details        oxyCODONE-acetaminophen (PERCOCET)  MG per tablet 1 tablet 1 tablet Once As Needed 1/11/2019 1/11/2019    Sig - Route: Take 1 tablet by mouth 1 (One) Time As Needed for Moderate Pain . - Oral    oxyCODONE-acetaminophen (PERCOCET) 5-325 MG per tablet 1 tablet 1 tablet Every 3 Hours PRN 1/11/2019 1/21/2019    Sig - Route: Take 1 tablet by mouth Every 3 (Three) Hours As Needed for Moderate Pain . - Oral    polyethylene glycol 3350 powder (packet) 17 g Daily 1/11/2019     Sig - Route: Take 17 g by mouth Daily. - Oral    sodium chloride 0.9 % bolus 500 mL 500 mL Once 1/11/2019     Sig - Route: Infuse 500 mL into a venous catheter 1 (One) Time. - Intravenous    Sodium chloride 0.9 % infusion 80 mL/hr Continuous 1/11/2019     Sig - Route: Infuse 80 mL/hr into a venous catheter Continuous. - Intravenous    acetaminophen (TYLENOL) tablet 650 mg (Discontinued) 650 mg Every 4 Hours PRN 1/10/2019 1/11/2019    Sig - Route: Take 2 tablets by mouth Every 4 (Four) Hours As Needed for Mild Pain . - Oral    Reason for Discontinue: Patient Transfer    atropine sulfate injection 0.5 " mg (Discontinued) 0.5 mg Once As Needed 1/11/2019 1/11/2019    Sig - Route: Infuse 5 mL into a venous catheter 1 (One) Time As Needed for Bradycardia (symptomatic bradycardia (hypotension, dizziness, confusion). Notify attending anesthesiologist.). - Intravenous    Reason for Discontinue: Patient Transfer    colchicine tablet 0.6 mg (Discontinued) 0.6 mg Every 12 Hours Scheduled 1/10/2019 1/11/2019    Sig - Route: Take 1 tablet by mouth Every 12 (Twelve) Hours. - Oral    Reason for Discontinue: Patient Transfer    dextrose (D50W) 25 g/ 50mL Intravenous Solution 12.5 g (Discontinued) 12.5 g As Needed 1/11/2019 1/11/2019    Sig - Route: Infuse 25 mL into a venous catheter As Needed for Low Blood Sugar (for blood sugar less than 60). - Intravenous    Reason for Discontinue: Patient Transfer    fentaNYL citrate (PF) (SUBLIMAZE) injection 25 mcg (Discontinued) 25 mcg Every 5 Minutes PRN 1/11/2019 1/11/2019    Sig - Route: Infuse 0.5 mL into a venous catheter Every 5 (Five) Minutes As Needed for Severe Pain . - Intravenous    Reason for Discontinue: Patient Transfer    fentaNYL citrate (PF) (SUBLIMAZE) injection 25 mcg (Discontinued) 25 mcg As Needed 1/11/2019 1/11/2019    Sig - Route: Infuse 0.5 mL into a venous catheter As Needed for Moderate Pain  or Severe Pain  (Use for breakthrough pain). - Intravenous    Reason for Discontinue: Patient Transfer    flumazenil (ROMAZICON) injection 0.2 mg (Discontinued) 0.2 mg As Needed 1/11/2019 1/11/2019    Sig - Route: Infuse 2 mL into a venous catheter As Needed (unresponsiveness). - Intravenous    Reason for Discontinue: Patient Transfer    hydrALAZINE (APRESOLINE) injection 5 mg (Discontinued) 5 mg Every 10 Minutes PRN 1/11/2019 1/11/2019    Sig - Route: Infuse 0.25 mL into a venous catheter Every 10 (Ten) Minutes As Needed for High Blood Pressure (for systolic blood pressure greater than 180 mmHg or diastolic blood pressure greater than 105 mmHg when HR less than 60). -  Intravenous    Reason for Discontinue: Patient Transfer    ibuprofen (ADVIL,MOTRIN) tablet 800 mg (Discontinued) 800 mg 3 Times Daily 1/10/2019 1/11/2019    Sig - Route: Take 1 tablet by mouth 3 (Three) Times a Day. - Oral    Reason for Discontinue: Patient Transfer    ipratropium-albuterol (DUO-NEB) nebulizer solution 3 mL (Discontinued) 3 mL Once As Needed 1/11/2019 1/11/2019    Sig - Route: Take 3 mL by nebulization 1 (One) Time As Needed for Wheezing or Shortness of Air (bronchospasm). - Nebulization    Reason for Discontinue: Patient Transfer    labetalol (NORMODYNE,TRANDATE) injection 5 mg (Discontinued) 5 mg Every 5 Minutes PRN 1/11/2019 1/11/2019    Sig - Route: Infuse 1 mL into a venous catheter Every 5 (Five) Minutes As Needed for High Blood Pressure (for systolic blood pressure greater than 180 mmHg or diastolic blood pressure greater than 105 mmHg). - Intravenous    Reason for Discontinue: Patient Transfer    lactated ringers infusion (Discontinued) 125 mL/hr Continuous 1/10/2019 1/11/2019    Sig - Route: Infuse 125 mL/hr into a venous catheter Continuous. - Intravenous    Reason for Discontinue: Patient Transfer    lactated ringers infusion (Discontinued) 9 mL/hr Continuous 1/11/2019 1/11/2019    Sig - Route: Infuse 9 mL/hr into a venous catheter Continuous. - Intravenous    Reason for Discontinue: Patient Transfer    lactated ringers infusion (Discontinued) 20 mL/hr Continuous 1/11/2019 1/11/2019    Sig - Route: Infuse 20 mL/hr into a venous catheter Continuous. - Intravenous    Reason for Discontinue: Patient Transfer    Cosign for Ordering: Required by Aden Parks MD    meperidine (DEMEROL) injection 12.5 mg (Discontinued) 12.5 mg Every 5 Minutes PRN 1/11/2019 1/11/2019    Sig - Route: Infuse 0.5 mL into a venous catheter Every 5 (Five) Minutes As Needed for Shivering (May repeat x 3). - Intravenous    Reason for Discontinue: Patient Transfer    methylPREDNISolone sodium succinate  "(SOLU-Medrol) injection 80 mg (Discontinued) 80 mg Every 12 Hours 1/10/2019 1/11/2019    Sig - Route: Infuse 1.28 mL into a venous catheter Every 12 (Twelve) Hours. - Intravenous    Reason for Discontinue: Patient Transfer    metoclopramide (REGLAN) injection 5 mg (Discontinued) 5 mg Every 15 Minutes PRN 1/11/2019 1/11/2019    Sig - Route: Infuse 1 mL into a venous catheter Every 15 (Fifteen) Minutes As Needed for Nausea or Vomiting (for nausea/vomiting). - Intravenous    Reason for Discontinue: Patient Transfer    midazolam (VERSED) injection 1 mg (Discontinued) 1 mg Every 5 Minutes PRN 1/11/2019 1/11/2019    Sig - Route: Infuse 1 mL into a venous catheter Every 5 (Five) Minutes As Needed for Anxiety (Max 4mg midazolam TOTAL). - Intravenous    Reason for Discontinue: Patient Transfer    Linked Group 2:  \"Or\" Linked Group Details        midazolam (VERSED) injection 2 mg (Discontinued) 2 mg Every 5 Minutes PRN 1/11/2019 1/11/2019    Sig - Route: Infuse 2 mL into a venous catheter Every 5 (Five) Minutes As Needed for Anxiety (Max 4mg midazolam TOTAL). - Intravenous    Reason for Discontinue: Patient Transfer    Linked Group 2:  \"Or\" Linked Group Details        Morphine sulfate (PF) injection 1 mg (Discontinued) 1 mg Every 4 Hours PRN 1/10/2019 1/11/2019    Sig - Route: Infuse 0.5 mL into a venous catheter Every 4 (Four) Hours As Needed for Severe Pain . - Intravenous    Reason for Discontinue: Patient Transfer    Linked Group 3:  \"And\" Linked Group Details        Morphine sulfate (PF) injection 2 mg (Discontinued) 2 mg Every 10 Minutes PRN 1/11/2019 1/11/2019    Sig - Route: Infuse 1 mL into a venous catheter Every 10 (Ten) Minutes As Needed for Moderate Pain  or Severe Pain . - Intravenous    Reason for Discontinue: Patient Transfer    naloxone (NARCAN) injection 0.04 mg (Discontinued) 0.04 mg As Needed 1/11/2019 1/11/2019    Sig - Route: Infuse 0.1 mL into a venous catheter As Needed for Opioid Reversal " "(unresponsiveness, decrease oxygen saturation). - Intravenous    Reason for Discontinue: Patient Transfer    naloxone (NARCAN) injection 0.4 mg (Discontinued) 0.4 mg Every 5 Minutes PRN 1/10/2019 1/11/2019    Sig - Route: Infuse 1 mL into a venous catheter Every 5 (Five) Minutes As Needed for Respiratory Depression. - Intravenous    Reason for Discontinue: Patient Transfer    Linked Group 3:  \"And\" Linked Group Details        ondansetron (ZOFRAN) injection 4 mg (Discontinued) 4 mg Every 6 Hours PRN 1/10/2019 1/11/2019    Sig - Route: Infuse 2 mL into a venous catheter Every 6 (Six) Hours As Needed for Nausea or Vomiting. - Intravenous    Reason for Discontinue: Patient Transfer    ondansetron (ZOFRAN) injection 4 mg (Discontinued) 4 mg As Needed 1/11/2019 1/11/2019    Sig - Route: Infuse 2 mL into a venous catheter As Needed for Nausea or Vomiting. - Intravenous    Reason for Discontinue: Patient Transfer    potassium chloride (MICRO-K) CR capsule 40 mEq (Discontinued) 40 mEq 2 Times Daily With Meals 1/10/2019 1/11/2019    Sig - Route: Take 4 capsules by mouth 2 (Two) Times a Day With Meals. - Oral    Reason for Discontinue: Patient Transfer    sodium chloride (NS) irrigation solution (Discontinued)  As Needed 1/11/2019 1/11/2019    Sig: As Needed.    Reason for Discontinue: Patient Discharge    sodium chloride 0.9 % flush 1-10 mL (Discontinued) 1-10 mL As Needed 1/11/2019 1/11/2019    Sig - Route: Infuse 1-10 mL into a venous catheter As Needed for Line Care. - Intravenous    Reason for Discontinue: Patient Transfer    sodium chloride 0.9 % flush 3 mL (Discontinued) 3 mL Every 12 Hours Scheduled 1/10/2019 1/11/2019    Sig - Route: Infuse 3 mL into a venous catheter Every 12 (Twelve) Hours. - Intravenous    Reason for Discontinue: Patient Transfer    sodium chloride 0.9 % flush 3-10 mL (Discontinued) 3-10 mL As Needed 1/10/2019 1/11/2019    Sig - Route: Infuse 3-10 mL into a venous catheter As Needed for Line Care. " - Intravenous    Reason for Discontinue: Patient Transfer            Lab Results (last 24 hours)     Procedure Component Value Units Date/Time    Basic Metabolic Panel [[id number]]  (Abnormal) Collected:  01/11/19 1035    Specimen:  Blood Updated:  01/11/19 1057     Glucose 138 mg/dL      BUN 9 mg/dL      Creatinine 0.45 mg/dL      Sodium 140 mmol/L      Potassium 4.1 mmol/L      Chloride 103 mmol/L      CO2 27.0 mmol/L      Calcium 7.9 mg/dL      eGFR  African Amer >150 mL/min/1.73      BUN/Creatinine Ratio 20.0     Anion Gap 10.0 mmol/L     Narrative:       GFR Normal >60  Chronic Kidney Disease <60  Kidney Failure <15    CBC (No Diff) [[id number]]  (Abnormal) Collected:  01/11/19 1035    Specimen:  Blood Updated:  01/11/19 1052     WBC 10.58 10*3/mm3      RBC 3.74 10*6/mm3      Hemoglobin 10.0 g/dL      Hematocrit 30.3 %      MCV 81.0 fL      MCH 26.7 pg      MCHC 33.0 g/dL      RDW 14.2 %      RDW-SD 41.9 fl      MPV 10.6 fL      Platelets 417 10*3/mm3     QuantiFERON TB Plus Client Incubated [[id number]] Collected:  01/11/19 0448    Specimen:  Blood Updated:  01/11/19 1033    Non-gynecologic Cytology [[id number]] Collected:  01/11/19 0857    Specimen:  Body Fluid from Pericardium Updated:  01/11/19 1026    Anaerobic Culture - Body Fluid, Pericardium [[id number]] Collected:  01/11/19 0832    Specimen:  Body Fluid from Pericardium Updated:  01/11/19 0942    Fungus Culture - Body Fluid, Pericardium [[id number]] Collected:  01/11/19 0832    Specimen:  Body Fluid from Pericardium Updated:  01/11/19 0942    Body Fluid Culture - Body Fluid, Pericardium [[id number]] Collected:  01/11/19 0832    Specimen:  Body Fluid from Pericardium Updated:  01/11/19 0942    AFB Culture - Body Fluid, Pericardium [[id number]] Collected:  01/11/19 0832    Specimen:  Body Fluid from Pericardium Updated:  01/11/19 0942    Tissue Pathology Exam [[id number]] Collected:  01/11/19 0843    Specimen:  Tissue from Pericardium, Tissue from Pericardium,  Tissue from Pericardium Updated:  01/11/19 0903    Anaerobic Culture - Tissue, Pericardium [[id number]] Collected:  01/11/19 0839    Specimen:  Tissue from Pericardium Updated:  01/11/19 0858    Fungus Culture - Tissue, Pericardium [[id number]] Collected:  01/11/19 0839    Specimen:  Tissue from Pericardium Updated:  01/11/19 0858    Tissue / Bone Culture - Tissue, Pericardium [[id number]] Collected:  01/11/19 0839    Specimen:  Tissue from Pericardium Updated:  01/11/19 0858    AFB Culture - Tissue, Pericardium [[id number]] Collected:  01/11/19 0839    Specimen:  Tissue from Pericardium Updated:  01/11/19 0858    Anaerobic Culture - Wound, Pericardium [[id number]] Collected:  01/11/19 0832    Specimen:  Wound from Pericardium Updated:  01/11/19 0856    Wound Culture - Wound, Pericardium [[id number]] Collected:  01/11/19 0832    Specimen:  Wound from Pericardium Updated:  01/11/19 0856    Blood Culture - Blood, Hand, Right [[id number]] Collected:  01/10/19 1920    Specimen:  Blood from Hand, Right Updated:  01/11/19 0815     Blood Culture No growth at less than 24 hours    Blood Culture - Blood, Arm, Left [[id number]] Collected:  01/10/19 1920    Specimen:  Blood from Arm, Left Updated:  01/11/19 0815     Blood Culture No growth at less than 24 hours    HIV-1 & HIV-2 Antibodies [[id number]] Collected:  01/11/19 0448    Specimen:  Blood Updated:  01/11/19 0557    Narrative:       The following orders were created for panel order HIV-1 & HIV-2 Antibodies.  Procedure                               Abnormality         Status                     ---------                               -----------         ------                     HIV-1 & HIV-2 Antibodies[[id number]]     Normal              Final result                 Please view results for these tests on the individual orders.    HIV-1 & HIV-2 Antibodies [[id number]]  (Normal) Collected:  01/11/19 0448    Specimen:  Blood Updated:  01/11/19 0557     HIV-1/ HIV-2 Negative    Basic  Metabolic Panel [[id number]]  (Abnormal) Collected:  01/11/19 0448    Specimen:  Blood Updated:  01/11/19 0512     Glucose 141 mg/dL      BUN 8 mg/dL      Creatinine 0.54 mg/dL      Sodium 140 mmol/L      Potassium 3.9 mmol/L      Chloride 103 mmol/L      CO2 28.0 mmol/L      Calcium 7.8 mg/dL      eGFR  African Amer >150 mL/min/1.73      BUN/Creatinine Ratio 14.8     Anion Gap 9.0 mmol/L     Narrative:       GFR Normal >60  Chronic Kidney Disease <60  Kidney Failure <15    CBC (No Diff) [[id number]]  (Abnormal) Collected:  01/11/19 0448    Specimen:  Blood Updated:  01/11/19 0501     WBC 9.40 10*3/mm3      RBC 2.93 10*6/mm3      Hemoglobin 7.9 g/dL      Hematocrit 23.8 %      MCV 81.2 fL      MCH 27.0 pg      MCHC 33.2 g/dL      RDW 14.3 %      RDW-SD 42.5 fl      MPV 10.4 fL      Platelets 370 10*3/mm3     Urinalysis With Culture If Indicated - Urine, Catheter [[id number]]  (Abnormal) Collected:  01/11/19 0055    Specimen:  Urine, Catheter Updated:  01/11/19 0222     Color, UA Dark Yellow     Appearance, UA Clear     pH, UA 5.5     Specific Gravity, UA 1.015     Glucose, UA Negative     Ketones, UA Negative     Bilirubin, UA Negative     Blood, UA Negative     Protein, UA Trace     Leuk Esterase, UA Trace     Nitrite, UA Negative     Urobilinogen, UA 1.0 E.U./dL    Urinalysis, Microscopic Only - Urine, Catheter [[id number]]  (Abnormal) Collected:  01/11/19 0055    Specimen:  Urine, Catheter Updated:  01/11/19 0222     RBC, UA 0-2 /HPF      WBC, UA 3-5 /HPF      Bacteria, UA 1+ /HPF      Squamous Epithelial Cells, UA 3-6 /HPF      Hyaline Casts, UA 0-2 /LPF      Methodology Manual Light Microscopy    Hepatitis Panel, Acute [[id number]]  (Normal) Collected:  01/10/19 1920    Specimen:  Blood Updated:  01/10/19 2221     HCV S/C Ratio 0.05     Hepatitis C Ab Negative     Hep A IgM Negative     Hep B C IgM Negative     Hepatitis B Surface Ag Negative    Rheumatoid Factor [[id number]]  (Abnormal) Collected:  01/10/19 1920     Specimen:  Blood Updated:  01/10/19 2101     Rheumatoid Factor Quantitative 185.1 IU/mL     TSH [[id number]]  (Normal) Collected:  01/10/19 1920    Specimen:  Blood Updated:  01/10/19 2054     TSH 1.380 mIU/mL     Procalcitonin [[id number]]  (Normal) Collected:  01/10/19 1921    Specimen:  Blood Updated:  01/10/19 2038     Procalcitonin <0.25 ng/mL     Narrative:       SIRS, sepsis, severe sepsis, and septic shock are categorized according to the criteria of the consensus conference of the American College of Chest Physicians/Society of Critical Care Medicine.    PCT < 0.5 ng/mL     Systemic infection (sepsis) is not likely.    PCT >0.5 and < 2.0 ng/mL Systemic infection (sepsis) is possible, but other conditions are known to elevate PCT as well.    PCT > 2.0 ng/mL     Systemic infection (sepsis) is likely, unless other causes are known.      PCT > 10.0 ng/mL    Important systemic inflammatory response, almost exclusively due to severe bacterial sepsis or septic shock.    PCT values of < 0.5 ng/mL do not exclude an infection, because localized infections (without systemic signs) may be associated with such low concentrations, or a systemic infection in its initial stages (<6 hours).  Increased PCT can occur without infection.  PCT concentrations between 0.5 and 2.0 ng/mL should be interpreted taking into account the patients history.  It is recommended to retest PCT within 6-24 hours if any concentrations < 2.0 ng/mL are obtained.    C-reactive Protein [[id number]]  (Abnormal) Collected:  01/10/19 1921    Specimen:  Blood Updated:  01/10/19 2036     C-Reactive Protein 23.88 mg/dL     Troponin [[id number]]  (Normal) Collected:  01/10/19 1921    Specimen:  Blood Updated:  01/10/19 2034     Troponin I <0.012 ng/mL     Comprehensive Metabolic Panel [[id number]]  (Abnormal) Collected:  01/10/19 1921    Specimen:  Blood Updated:  01/10/19 2026     Glucose 100 mg/dL      BUN 7 mg/dL      Creatinine 0.68 mg/dL      Sodium 140  mmol/L      Potassium 3.2 mmol/L      Chloride 99 mmol/L      CO2 29.0 mmol/L      Calcium 7.7 mg/dL      Total Protein 6.6 g/dL      Albumin 3.00 g/dL      ALT (SGPT) 53 U/L      AST (SGOT) 35 U/L      Alkaline Phosphatase 83 U/L      Total Bilirubin 0.7 mg/dL      eGFR   Amer 124 mL/min/1.73      Globulin 3.6 gm/dL      A/G Ratio 0.8 g/dL      BUN/Creatinine Ratio 10.3     Anion Gap 12.0 mmol/L     CK [[id number]]  (Normal) Collected:  01/10/19 1921    Specimen:  Blood Updated:  01/10/19 2026     Creatine Kinase 58 U/L     Lactic Acid, Plasma [[id number]]  (Normal) Collected:  01/10/19 1920    Specimen:  Blood Updated:  01/10/19 2022     Lactate 1.1 mmol/L     Protime-INR [[id number]]  (Abnormal) Collected:  01/10/19 1920    Specimen:  Blood Updated:  01/10/19 2020     Protime 17.6 Seconds      INR 1.40    aPTT [[id number]]  (Abnormal) Collected:  01/10/19 1920    Specimen:  Blood Updated:  01/10/19 2020     PTT 38.8 seconds     Sedimentation Rate [[id number]]  (Abnormal) Collected:  01/10/19 1920    Specimen:  Blood Updated:  01/10/19 2019     Sed Rate 46 mm/hr     CBC Auto Differential [[id number]]  (Abnormal) Collected:  01/10/19 1920    Specimen:  Blood Updated:  01/10/19 2012     WBC 10.57 10*3/mm3      RBC 3.09 10*6/mm3      Hemoglobin 8.4 g/dL      Hematocrit 25.7 %      MCV 83.2 fL      MCH 27.2 pg      MCHC 32.7 g/dL      RDW 14.5 %      RDW-SD 43.8 fl      MPV 10.9 fL      Platelets 444 10*3/mm3      Neutrophil % 74.4 %      Lymphocyte % 10.8 %      Monocyte % 11.9 %      Eosinophil % 2.3 %      Basophil % 0.2 %      Immature Grans % 0.4 %      Neutrophils, Absolute 7.87 10*3/mm3      Lymphocytes, Absolute 1.14 10*3/mm3      Monocytes, Absolute 1.26 10*3/mm3      Eosinophils, Absolute 0.24 10*3/mm3      Basophils, Absolute 0.02 10*3/mm3      Immature Grans, Absolute 0.04 10*3/mm3      nRBC 0.0 /100 WBC     C3 Complement [[id number]] Collected:  01/10/19 1920    Specimen:  Blood Updated:  01/10/19 2005     Cyclic Citrul Peptide Antibody, IgG / IgA [[id number]] Collected:  01/10/19 1920    Specimen:  Blood Updated:  01/10/19 2005    IgE [[id number]] Collected:  01/10/19 1920    Specimen:  Blood Updated:  01/10/19 2005    Nuclear Antigen Antibody, IFA [[id number]] Collected:  01/10/19 1920    Specimen:  Blood Updated:  01/10/19 2005    ANCA Panel [[id number]] Collected:  01/10/19 1920    Specimen:  Blood Updated:  01/10/19 2005    AMBERLY Comprehensive Plus Profile [[id number]] Collected:  01/10/19 1920    Specimen:  Blood Updated:  01/10/19 2004    C4 Complement [[id number]] Collected:  01/10/19 1920    Specimen:  Blood Updated:  01/10/19 2004        Imaging Results (last 24 hours)     Procedure Component Value Units Date/Time    XR Chest 1 View [[id number]] Collected:  01/11/19 1040     Updated:  01/11/19 1058    Narrative:       EXAMINATION:   XR CHEST 1 -  1/11/2019 10:40 AM CST     HISTORY: Paracardial drain     Frontal upright radiograph of the chest 1/11/2019 10:28 AM CST     COMPARISON: January 10, 2019.     FINDINGS:   The right lung is clear. A moderate left pleural effusion is present..  The cardiac silhouettes moderately enlarged.     PeriCardial drains are present..      The osseous structures and surrounding soft tissues demonstrate no acute  abnormality.       Impression:       1. 2 drains are noted. These are pericardial drains     Moderate left pleural effusion.        This report was finalized on 01/11/2019 10:55 by Dr. Honorio Garay MD.    XR Chest 1 View [[id number]] Collected:  01/10/19 2132     Updated:  01/10/19 2136    Narrative:       EXAMINATION:  XR CHEST 1 -  1/10/2019 9:27 PM CST     HISTORY: I31.4-Cardiac tamponade; I75-Eiwdblo effusion, not elsewhere  classified; I30.9-Acute pericarditis, unspecified; M06.9-Rheumatoid  arthritis, unspecified; M06.9-Rheumatoid arthritis, unspecified     COMPARISON: No comparison study.     FINDINGS:  There is very poor inspiration. There is dense infiltrate  at  the left lung base. The left hemidiaphragm is obscured. There is patchy  infiltrate at the right lung base. There is at least moderate  cardiomegaly. The left heart border is obscured by the infiltrate on the  left.       Impression:       1. Poor inspiration.  2. Dense infiltrate on the left. The left hemidiaphragm is obscured. The  left heart border is obscured. This may represent pneumonia and/or  pleural effusion.  3. Mild patchy infiltrate at the right base.  4. Heart size is difficult to evaluate due to the infiltrate on the  left. I suspect there is at least moderate cardiomegaly.        This report was finalized on 01/10/2019 21:33 by Dr. José Miguel Busby MD.        ECG/EMG Results (last 24 hours)     Procedure Component Value Units Date/Time    ECG 12 Lead [[id number]] Collected:  01/10/19 2015     Updated:  01/11/19 0736    Narrative:       Test Reason : pericardial effusion  Blood Pressure : **/** mmHG  Vent. Rate : 116 BPM     Atrial Rate : 116 BPM     P-R Int : 120 ms          QRS Dur : 082 ms      QT Int : 334 ms       P-R-T Axes : 037 020 035 degrees     QTc Int : 464 ms    Sinus tachycardia  Poor R-wave progression consistent with anteroseptal scar, faulty lead  placement,normal variant, or copd.  Nonspecific ST and T wave abnormality  Abnormal ECG  No previous ECGs available    Referred By:  DIOGO           Confirmed By:Rajat Le MD          Orders (last 24 hrs)     Start     Ordered    01/12/19 0900  enoxaparin (LOVENOX) syringe 30 mg  Every 12 Hours Scheduled      01/11/19 1203    01/12/19 0600  Basic Metabolic Panel  Morning Draw      01/11/19 1203    01/12/19 0600  CBC (No Diff)  Morning Draw      01/11/19 1203    01/12/19 0600  Discontinue Indwelling Urinary Catheter in AM if Urine Output Greater Than 120 mL in 4 Hours Prior to Removal  Once      01/11/19 1203    01/12/19 0400  XR Chest 1 View  Daily      01/11/19 1203    01/11/19 1615  ceFAZolin in 0.9% normal saline (ANCEF) IVPB  solution 2 g  Every 8 Hours      01/11/19 1203    01/11/19 1500  ipratropium-albuterol (DUO-NEB) nebulizer solution 3 mL  Every 8 Hours - RT      01/11/19 1203    01/11/19 1500  acetylcysteine (MUCOMYST) 20 % nebulizer solution 3 mL  Every 8 Hours - RT      01/11/19 1203    01/11/19 1400  Incentive Spirometry  Every 2 Hours While Awake      01/11/19 1203    01/11/19 1300  Vital Signs every 1 hour x4, then every 2 hours x2, then every 4 hours.  Every Hour      01/11/19 1203    01/11/19 1300  Ambulate patient in AM  3 Times Daily      01/11/19 1203    01/11/19 1300  sodium chloride 0.9 % bolus 500 mL  Once      01/11/19 1203    01/11/19 1300  Sodium chloride 0.9 % infusion  Continuous      01/11/19 1203    01/11/19 1300  docusate sodium (COLACE) capsule 100 mg  2 Times Daily      01/11/19 1203    01/11/19 1300  polyethylene glycol 3350 powder (packet)  Daily      01/11/19 1203    01/11/19 1204  Oxygen Therapy- Blow by - Humidified; Titrate for SPO2: equal to or greater than, per policy, 92%  Continuous      01/11/19 1203    01/11/19 1204  Pulse Oximetry, Continuous  Continuous      01/11/19 1203    01/11/19 1204  Chest Tube Dressing Change Daily  Daily     Comments:  Dry Gauze & Tape    01/11/19 1203    01/11/19 1203  Cardiac Monitoring  Continuous      01/11/19 1203    01/11/19 1203  Elevate HOB  Continuous      01/11/19 1203    01/11/19 1203  I&O Every 4 Hours For the First 24 Hours, Then Every Shift  Every Shift      01/11/19 1203    01/11/19 1203  Chest Tube to Continuous Suction  Continuous      01/11/19 1203    01/11/19 1203  Remove incision line dressing.  Once     Comments:  POD1    01/11/19 1203    01/11/19 1203  Notify Physician (Standard Adult Parameters)  Until Discontinued      01/11/19 1203    01/11/19 1203  Notify Provider - Chest Tube Output Greater Than 100 mL/hr  Until Discontinued      01/11/19 1203    01/11/19 1203  Oxygen Therapy- Nasal Cannula; Titrate for SPO2: equal to or greater than, 92%, per  policy  Continuous      01/11/19 1203    01/11/19 1203  Continuous Pulse Oximetry  Continuous,   Status:  Canceled      01/11/19 1203    01/11/19 1203  NPO Diet  Diet Effective Now      01/11/19 1203    01/11/19 1203  Advance diet as tolerated  Until Discontinued      01/11/19 1203    01/11/19 1203  Tobacco Cessation Education  Once      01/11/19 1203    01/11/19 1203  Insert Indwelling Urinary Catheter  Once      01/11/19 1203    01/11/19 1203  Assess Need for Indwelling Urinary Catheter - Follow Removal Protocol  Continuous     Comments:  Indwelling Urinary Catheter Removal Criteria  Discontinue Indwelling Urinary Catheter Unless One of the Following is Present  Urinary Retention or Obstruction  Chronic Velez Catheter Use  End of Life  Critical Illness with Strict I/O   Tract or Abdominal Surgery  Stage 3/4 Sacral / Perineal Wound  Required Activity Restriction: Trauma  Required Activity Restriction: Spine Surgery  If Patient is Being Followed by Urology Contact Them PRIOR to Removal  Do Not Remove Indwelling Urinary Catheter Order is Present with a CLINICAL REASON to Maintain the Catheter. Provider is Required to Include a Clinical Reason to Maintain a Urinary Catheter    Chronic Velez Catheter Use (Present on Admission)  Assess for Continued Need & Document Medical Necessity  If Infection is Suspected, Contact the Provider        01/11/19 1203    01/11/19 1203  Catheter Care  Every Shift      01/11/19 1203    01/11/19 1203  Continue Indwelling Urinary Catheter Already in Place  Once      01/11/19 1203    01/11/19 1203  Notify Provider if Bladder Distention Continues  Until Discontinued      01/11/19 1203    01/11/19 1203  Catheter Care  Every Shift      01/11/19 1203    01/11/19 1202  ondansetron (ZOFRAN) tablet 4 mg  Every 6 Hours PRN      01/11/19 1203    01/11/19 1202  ondansetron ODT (ZOFRAN-ODT) disintegrating tablet 4 mg  Every 6 Hours PRN      01/11/19 1203    01/11/19 1202  ondansetron (ZOFRAN)  injection 4 mg  Every 6 Hours PRN      01/11/19 1203    01/11/19 1202  oxyCODONE-acetaminophen (PERCOCET) 5-325 MG per tablet 1 tablet  Every 3 Hours PRN      01/11/19 1203    01/11/19 1202  bisacodyl (DULCOLAX) suppository 10 mg  Daily PRN      01/11/19 1203    01/11/19 1034  QuantiFERON TB Plus Client Incubated  Once      01/11/19 1033    01/11/19 1015  metoprolol tartrate (LOPRESSOR) tablet 12.5 mg  Every 12 Hours Scheduled      01/11/19 1013    01/11/19 1009  Basic Metabolic Panel  STAT      01/11/19 1013    01/11/19 1009  CBC (No Diff)  STAT      01/11/19 1013    01/11/19 1009  XR Chest 1 View  1 Time Imaging,   Status:  Canceled     Comments:  Please call surgeon with results of CXR.    01/11/19 1013    01/11/19 0953  Vital signs every 5 minutes for 15 minutes, every 15 minutes thereafter.  Once,   Status:  Canceled      01/11/19 0952    01/11/19 0953  Call Anesthesiologist for additional IV Fluid bolus for Hypotension/Tachycardia  Continuous,   Status:  Canceled      01/11/19 0952    01/11/19 0953  Notify Anesthesia of Any Acute Changes in Patient Condition  Until Discontinued,   Status:  Canceled      01/11/19 0952    01/11/19 0953  Notify Anesthesia for Unrelieved Pain  Until Discontinued,   Status:  Canceled      01/11/19 0952    01/11/19 0953  Once DC criteria to floor met, follow surgeon's orders.  Until Discontinued,   Status:  Canceled      01/11/19 0952    01/11/19 0953  Discharge patient from PACU when discharge criteria is met.  Until Discontinued,   Status:  Canceled      01/11/19 0952    01/11/19 0952  Apply warming blanket  As Needed,   Status:  Canceled     Comments:  For a recorded temp of <36.9 C    01/11/19 0952    01/11/19 0952  oxyCODONE-acetaminophen (PERCOCET)  MG per tablet 1 tablet  Once As Needed      01/11/19 0952    01/11/19 0952  Morphine sulfate (PF) injection 2 mg  Every 10 Minutes PRN,   Status:  Discontinued      01/11/19 0952 01/11/19 0952  fentaNYL citrate (PF)  (SUBLIMAZE) injection 25 mcg  As Needed,   Status:  Discontinued      01/11/19 0952    01/11/19 0952  labetalol (NORMODYNE,TRANDATE) injection 5 mg  Every 5 Minutes PRN,   Status:  Discontinued      01/11/19 0952    01/11/19 0952  hydrALAZINE (APRESOLINE) injection 5 mg  Every 10 Minutes PRN,   Status:  Discontinued      01/11/19 0952    01/11/19 0952  atropine sulfate injection 0.5 mg  Once As Needed,   Status:  Discontinued      01/11/19 0952    01/11/19 0952  ipratropium-albuterol (DUO-NEB) nebulizer solution 3 mL  Once As Needed,   Status:  Discontinued      01/11/19 0952    01/11/19 0952  naloxone (NARCAN) injection 0.04 mg  As Needed,   Status:  Discontinued      01/11/19 0952    01/11/19 0952  flumazenil (ROMAZICON) injection 0.2 mg  As Needed,   Status:  Discontinued      01/11/19 0952    01/11/19 0952  ondansetron (ZOFRAN) injection 4 mg  As Needed,   Status:  Discontinued      01/11/19 0952    01/11/19 0952  metoclopramide (REGLAN) injection 5 mg  Every 15 Minutes PRN,   Status:  Discontinued      01/11/19 0952    01/11/19 0952  meperidine (DEMEROL) injection 12.5 mg  Every 5 Minutes PRN,   Status:  Discontinued      01/11/19 0952    01/11/19 0923  Fungus Culture - Body Fluid, Pericardium     Comments:  Specimen A: NOCARDIA AND ACTINOBACTER      01/11/19 0923    01/11/19 0923  Gram Stain     Comments:  Specimen A: NOCARDIA AND ACTINOBACTER      01/11/19 0923    01/11/19 0923  Body Fluid Culture - Body Fluid, Pericardium     Comments:  Specimen A: NOCARDIA AND ACTINOBACTER      01/11/19 0923    01/11/19 0923  AFB Culture - Body Fluid, Pericardium     Comments:  Specimen A: NOCARDIA AND ACTINOBACTER      01/11/19 0923 01/11/19 0923  Non-gynecologic Cytology      01/11/19 0923 01/11/19 0923  Anaerobic Culture - Body Fluid, Pericardium     Comments:  Specimen A: NOCARDIA AND ACTINOBACTER      01/11/19 0923    01/11/19 0857  sodium chloride (NS) irrigation solution  As Needed,   Status:  Discontinued       01/11/19 0857    01/11/19 0846  Anaerobic Culture - Tissue, Pericardium     Comments:  Specimen 2: NOCARDIA AND ACTINOVACTOR      01/11/19 0846    01/11/19 0846  Fungus Culture - Tissue, Pericardium     Comments:  Specimen 2: NOCARDIA AND ACTINOVACTOR      01/11/19 0846    01/11/19 0846  Tissue / Bone Culture - Tissue, Pericardium     Comments:  Specimen 2: NOCARDIA AND ACTINOVACTOR      01/11/19 0846    01/11/19 0846  AFB Culture - Tissue, Pericardium     Comments:  Specimen 2: NOCARDIA AND ACTINOVACTOR      01/11/19 0846    01/11/19 0843  Tissue Pathology Exam      01/11/19 0843    01/11/19 0836  Anaerobic Culture - Wound, Pericardium      01/11/19 0836    01/11/19 0836  Wound Culture - Wound, Pericardium      01/11/19 0836    01/11/19 0740  lactated ringers infusion  Continuous,   Status:  Discontinued      01/11/19 0738    01/11/19 0736  lactated ringers infusion  Continuous,   Status:  Discontinued      01/11/19 0734    01/11/19 0736  methylPREDNISolone sodium succinate (SOLU-Medrol) injection 125 mg  Once      01/11/19 0734    01/11/19 0733  midazolam (VERSED) injection 1 mg  Every 5 Minutes PRN,   Status:  Discontinued      01/11/19 0734    01/11/19 0733  midazolam (VERSED) injection 2 mg  Every 5 Minutes PRN,   Status:  Discontinued      01/11/19 0734    01/11/19 0733  Oxygen Therapy- Nasal Cannula; Titrate for SPO2: equal to or greater than, 90%  Continuous,   Status:  Canceled      01/11/19 0734    01/11/19 0733  Pulse Oximetry, Continuous  Continuous,   Status:  Canceled      01/11/19 0734    01/11/19 0733  Insert Peripheral IV  Once,   Status:  Canceled      01/11/19 0734    01/11/19 0733  Saline Lock & Maintain IV Access  Continuous,   Status:  Canceled      01/11/19 0734    01/11/19 0732  Vital Signs - Per Anesthesia Protocol  As Needed,   Status:  Canceled      01/11/19 0734    01/11/19 0732  sodium chloride 0.9 % flush 1-10 mL  As Needed,   Status:  Discontinued      01/11/19 0734    01/11/19 0732   buffered lidocaine syringe 0.5 mL  Once As Needed      01/11/19 0734    01/11/19 0732  fentaNYL citrate (PF) (SUBLIMAZE) injection 25 mcg  Every 5 Minutes PRN,   Status:  Discontinued      01/11/19 0734    01/11/19 0732  dextrose (D50W) 25 g/ 50mL Intravenous Solution 12.5 g  As Needed,   Status:  Discontinued      01/11/19 0734    01/11/19 0700  XR Chest 1 View  1 Time Imaging      01/10/19 1907    01/11/19 0600  CBC (No Diff)  Morning Draw      01/10/19 1839    01/11/19 0600  Basic Metabolic Panel  Morning Draw      01/10/19 1839    01/11/19 0600  HIV-1 & HIV-2 Antibodies  Morning Draw      01/10/19 2035    01/11/19 0600  QuantiFERON TB Gold  Morning Draw,   Status:  Canceled      01/10/19 2035    01/11/19 0600  HIV-1 & HIV-2 Antibodies  PROCEDURE ONCE      01/11/19 0002    01/11/19 0110  Urinalysis, Microscopic Only - Urine, Clean Catch  Once      01/11/19 0109    01/11/19 0053  Urinalysis With Culture If Indicated - Urine, Catheter  Once      01/11/19 0054    01/10/19 2315  potassium chloride (MICRO-K) CR capsule 40 mEq  2 Times Daily With Meals,   Status:  Discontinued      01/10/19 2220    01/10/19 2153  Insert Indwelling Urinary Catheter  Once      01/10/19 2153    01/10/19 2153  Assess Need for Indwelling Urinary Catheter - Follow Removal Protocol  Continuous,   Status:  Canceled     Comments:  Indwelling Urinary Catheter Removal Criteria  Discontinue Indwelling Urinary Catheter Unless One of the Following is Present  Urinary Retention or Obstruction  Chronic Velez Catheter Use  End of Life  Critical Illness with Strict I/O   Tract or Abdominal Surgery  Stage 3/4 Sacral / Perineal Wound  Required Activity Restriction: Trauma  Required Activity Restriction: Spine Surgery  If Patient is Being Followed by Urology Contact Them PRIOR to Removal  Do Not Remove Indwelling Urinary Catheter Order is Present with a CLINICAL REASON to Maintain the Catheter. Provider is Required to Include a Clinical Reason to  Maintain a Urinary Catheter    Chronic Velez Catheter Use (Present on Admission)  Assess for Continued Need & Document Medical Necessity  If Infection is Suspected, Contact the Provider        01/10/19 2153    01/10/19 2153  Catheter Care  Every Shift,   Status:  Canceled      01/10/19 2153    01/10/19 2101  Case request  Once      01/10/19 2107    01/10/19 2100  sodium chloride 0.9 % flush 3 mL  Every 12 Hours Scheduled,   Status:  Discontinued      01/10/19 1839    01/10/19 2100  ibuprofen (ADVIL,MOTRIN) tablet 800 mg  3 Times Daily,   Status:  Discontinued      01/10/19 1856    01/10/19 2100  colchicine tablet 0.6 mg  Every 12 Hours Scheduled,   Status:  Discontinued      01/10/19 1856    01/10/19 2035  Hepatitis Panel, Acute  Once      01/10/19 2035    01/10/19 2000  Vital Signs  Every 4 Hours,   Status:  Canceled      01/10/19 1839    01/10/19 2000  Incentive Spirometry  Every 2 Hours While Awake,   Status:  Canceled      01/10/19 1900    01/10/19 1954  Insert Arterial Line  Continuous,   Status:  Canceled      01/10/19 1953    01/10/19 1945  methylPREDNISolone sodium succinate (SOLU-Medrol) injection 80 mg  Every 12 Hours,   Status:  Discontinued      01/10/19 1856    01/10/19 1930  lactated ringers infusion  Continuous,   Status:  Discontinued      01/10/19 1839    01/10/19 1912  aPTT  STAT      01/10/19 1911    01/10/19 1910  Type & Screen  STAT      01/10/19 1910    01/10/19 1910  XR Chest 1 View  1 Time Imaging      01/10/19 1910    01/10/19 1859  ECG 12 Lead  Once      01/10/19 1858    01/10/19 1859  Blood Culture - Blood,  Once      01/10/19 1859    01/10/19 1859  Blood Culture - Blood,  Once     Comments:  From a different site than #1.      01/10/19 1859    01/10/19 1852  IgE  Once      01/10/19 1851    01/10/19 1851  Nuclear Antigen Antibody, IFA  Once      01/10/19 1851    01/10/19 1851  AMBERLY Comprehensive Plus Profile  Once      01/10/19 1851    01/10/19 1851  C3 Complement  Once      01/10/19 1851     01/10/19 1851  C4 Complement  Once      01/10/19 1851    01/10/19 1851  Rheumatoid Factor  Once      01/10/19 1851    01/10/19 1851  Cyclic Citrul Peptide Antibody, IgG / IgA  Once      01/10/19 1851    01/10/19 1851  ANCA Panel  Once      01/10/19 1851    01/10/19 1841  CK  STAT      01/10/19 1840    01/10/19 1839  Procalcitonin  STAT      01/10/19 1839    01/10/19 1839  Protime-INR  STAT      01/10/19 1839    01/10/19 1839  Troponin  STAT      01/10/19 1839    01/10/19 1839  TSH  STAT      01/10/19 1839    01/10/19 1839  Sedimentation Rate  STAT      01/10/19 1839    01/10/19 1839  C-reactive Protein  STAT      01/10/19 1839    01/10/19 1838  Morphine sulfate (PF) injection 1 mg  Every 4 Hours PRN,   Status:  Discontinued      01/10/19 1839    01/10/19 1838  naloxone (NARCAN) injection 0.4 mg  Every 5 Minutes PRN,   Status:  Discontinued      01/10/19 1839    01/10/19 1838  ondansetron (ZOFRAN) injection 4 mg  Every 6 Hours PRN,   Status:  Discontinued      01/10/19 1839    01/10/19 1838  acetaminophen (TYLENOL) tablet 650 mg  Every 4 Hours PRN,   Status:  Discontinued      01/10/19 1839    01/10/19 1838  Inpatient Rheumatology Consult  Once     Specialty:  Rheumatology  Provider:  Johann Bray MD    01/10/19 1839    01/10/19 1838  CBC Auto Differential  STAT      01/10/19 1839    01/10/19 1838  Comprehensive Metabolic Panel  STAT      01/10/19 1839    01/10/19 1838  Lactic Acid, Plasma  STAT      01/10/19 1839    01/10/19 1837  Place Sequential Compression Device  Once,   Status:  Canceled      01/10/19 1839    01/10/19 1837  Maintain Sequential Compression Device  Continuous,   Status:  Canceled      01/10/19 1839    01/10/19 1837  Cardiac Monitoring  Continuous,   Status:  Canceled      01/10/19 1839    01/10/19 1837  Activity - Strict Bed Rest  Until Discontinued,   Status:  Canceled      01/10/19 1839    01/10/19 1837  NPO Diet  Diet Effective Now,   Status:  Canceled      01/10/19 1839     01/10/19 1837  Inpatient Cardiothoracic Surgery Consult  Once     Specialty:  Cardiothoracic Surgery  Provider:  Marcelino Dougherty MD    01/10/19 1839    01/10/19 1836  Inpatient Admission  Once      01/10/19 1839    01/10/19 1835  Midline Consult  Once     Provider:  (Not yet assigned)    01/10/19 1834    01/10/19 1835  Code Status and Medical Interventions:  Continuous      01/10/19 1839    01/10/19 1835  Intake & Output  Every Shift,   Status:  Canceled      01/10/19 1839    01/10/19 1835  Weigh Patient  Once,   Status:  Canceled      01/10/19 1839    01/10/19 1835  Oxygen Therapy- Nasal Cannula; Titrate for SPO2: 90%  Continuous,   Status:  Canceled      01/10/19 1839    01/10/19 1835  Insert Peripheral IV  Once      01/10/19 1839    01/10/19 1835  Saline Lock & Maintain IV Access  Continuous,   Status:  Canceled      01/10/19 1839    01/10/19 1834  sodium chloride 0.9 % flush 3-10 mL  As Needed,   Status:  Discontinued      01/10/19 1839    Unscheduled  Up in Chair tonight if hemodynamically stable and for meals.  As Needed      01/11/19 1203    Unscheduled  If Unable to Void Within 6 Hours Post-Op Place Velez. If Urine Output is Greater Than 300mL Leave Catheter In Place  As Needed      01/11/19 1203    Unscheduled  Bladder Scan if Patient Unable to Void 4-6 Hours After Catheter Removal  As Needed      01/11/19 1203    Unscheduled  Straight Cath Every 4-6 Hours As Needed If Patient is Unable to Void After 4-6 Hours, Bladder Scan Volume is Greater Than 350-500mL & Patient Has Symptoms of Bladder Discomfort / Distention  As Needed      01/11/19 1203    Unscheduled  Consult Pharmacist For Review of Medications That May Cause Urinary Retention - RN To Place Order for Consult it Needed  As Needed      01/11/19 1203    Unscheduled  Schedule / Prompt Voiding For Patients With Urinary Incontinence  As Needed      01/11/19 1203    --  SCANNED - TELEMETRY        01/10/19 0000          Ventilator/Non-Invasive  Ventilation Settings (From admission, onward)    None        Operative/Procedure Notes (last 24 hours) (Notes from 1/10/2019 12:25 PM through 1/11/2019 12:25 PM)     No notes of this type exist for this encounter.        Physician Progress Notes (last 24 hours) (Notes from 1/10/2019 12:25 PM through 1/11/2019 12:25 PM)     No notes of this type exist for this encounter.           Consult Notes (last 24 hours) (Notes from 1/10/2019 12:25 PM through 1/11/2019 12:25 PM)      Marcelino Dougherty MD at 1/11/2019  6:50 AM      Consult Orders    1. Inpatient Cardiothoracic Surgery Consult [[id number]] ordered by Galen Pandey MD at 01/10/19 1832                CC: chest pain    Subjective     History of Present Illness    29 yo female with now recently post partum (2nd child) and known rheumatoid arthritis presents with increasing chest pain following having a c section on the 22nd of December.  She was diagnosed with pericarditis and treated with ibuprofen and colchicine.  She did have some improvement initially but then she had worsening chest pain and now shortness of breath.  OSH imaging showed large pericardial effusion with evidence of tamponade based on echo criteria.  Imaging showed compressive atelectasis with left pleural effusion.  Her CP is positional.  No F/S/C.  THis is her first event.  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  With that she was transferred here for further evaluation and therapy.  I have discussed her case with both Dr. Pandey and Dr. Reid.  ECHO shows fibrinous changes within a large pericardial effusion.  Pulsus paradoxus is > 20.    She is comfortably at this time with her dad at bedside.      Review of Systems   Constitutional: Positive for activity change and fatigue. Negative for appetite change, chills, diaphoresis, fever and unexpected weight change.   HENT: Negative for dental problem, hearing loss, nosebleeds, sore  throat, trouble swallowing and voice change.    Eyes: Negative for photophobia, redness and visual disturbance.   Respiratory: Positive for chest tightness and shortness of breath. Negative for apnea, cough, wheezing and stridor.    Cardiovascular: Positive for chest pain and leg swelling. Negative for palpitations.   Gastrointestinal: Positive for abdominal pain. Negative for abdominal distention, blood in stool, constipation, diarrhea, nausea and vomiting.   Endocrine: Negative for cold intolerance, heat intolerance, polyphagia and polyuria.   Genitourinary: Negative for decreased urine volume, difficulty urinating, dysuria, flank pain, frequency, hematuria and urgency.   Musculoskeletal: Negative for arthralgias, back pain, gait problem, joint swelling, myalgias and neck pain.   Skin: Negative for pallor, rash and wound.   Allergic/Immunologic: Negative for immunocompromised state.   Neurological: Negative for dizziness, tremors, seizures, syncope, speech difficulty, weakness, light-headedness, numbness and headaches.   Hematological: Does not bruise/bleed easily.   Psychiatric/Behavioral: Negative for confusion, sleep disturbance and suicidal ideas. The patient is not nervous/anxious.           Past Medical History:   Diagnosis Date   • Arthritis     RA     Past Surgical History:   Procedure Laterality Date   •  SECTION  2018     No family history on file.  Social History     Tobacco Use   • Smoking status: Never Smoker   • Smokeless tobacco: Never Used   Substance Use Topics   • Alcohol use: Not on file   • Drug use: Not on file     No medications prior to admission.     Allergies:  Patient has no known allergies.    Objective      Vital Signs  Temp:  [97.4 °F (36.3 °C)-99.7 °F (37.6 °C)] 97.4 °F (36.3 °C)  Heart Rate:  [] 85  Resp:  [20-28] 23  BP: (133-150)/(84-98) 136/98  Arterial Line BP: (100-157)/(65-96) 119/71    Physical Exam   Constitutional: She is oriented to person, place, and  time. She appears well-developed.   HENT:   Head: Normocephalic and atraumatic.   Mouth/Throat: Oropharynx is clear and moist.   Eyes: EOM are normal. Pupils are equal, round, and reactive to light.   Neck: Normal range of motion. Neck supple. No JVD present. No tracheal deviation present. No thyromegaly present.   Cardiovascular: Normal rate, regular rhythm, normal heart sounds and intact distal pulses. Exam reveals no gallop and no friction rub.   No murmur heard.  Pulmonary/Chest: Effort normal and breath sounds normal. No respiratory distress. She has no wheezes. She has no rales. She exhibits no tenderness.   Abdominal: Soft. She exhibits no distension. There is no tenderness.   Post partum abdomen   Musculoskeletal: Normal range of motion. She exhibits no edema.   Lymphadenopathy:     She has no cervical adenopathy.   Neurological: She is alert and oriented to person, place, and time. No cranial nerve deficit.   Skin: Skin is warm and dry.   Psychiatric: She has a normal mood and affect.       Results Review:   Xr Chest 1 View    Result Date: 1/10/2019  Narrative: EXAMINATION:  XR CHEST 1 VW-  1/10/2019 9:27 PM CST  HISTORY: I31.4-Cardiac tamponade; C53-Jxlubsp effusion, not elsewhere classified; I30.9-Acute pericarditis, unspecified; M06.9-Rheumatoid arthritis, unspecified; M06.9-Rheumatoid arthritis, unspecified  COMPARISON: No comparison study.  FINDINGS:  There is very poor inspiration. There is dense infiltrate at the left lung base. The left hemidiaphragm is obscured. There is patchy infiltrate at the right lung base. There is at least moderate cardiomegaly. The left heart border is obscured by the infiltrate on the left.      Impression: 1. Poor inspiration. 2. Dense infiltrate on the left. The left hemidiaphragm is obscured. The left heart border is obscured. This may represent pneumonia and/or pleural effusion. 3. Mild patchy infiltrate at the right base. 4. Heart size is difficult to evaluate due to  the infiltrate on the left. I suspect there is at least moderate cardiomegaly.   This report was finalized on 01/10/2019 21:33 by Dr. José Miguel Busby MD.     I reviewed the patient's new clinical results.  Discussed with patient and father      Assessment/Plan       Cardiac tamponade    Pleural effusion    Sinus tachycardia    Pericarditis    Rheumatoid arthritis (CMS/HCC)    Sickle cell trait (CMS/HCC)    Chest pain    Acute pericarditis associated with rheumatoid arthritis (CMS/HCC)          pericardial effusion  I discussed the patients findings and my recommendations with patient and  father.  We discussed the rationale for a pericardial window specifically for diagnosis and treatment.  We discussed the options for treatment of a pericardial effusion which is causing cardiac tamponade physiology.  We discussed options to include medical therapy, pericardiocentesis, and surgical pericardial window.  We discussed the pros and cons of each option and how it pertains to the current case.  Subxiphoid pericardial window is best option given patient's findings and risk factors.  We discussed the operative conduct and expected hospital and outpatient recovery.  Risks were discussed to include but not limited to bleeding, infection, stroke, heart attack, need for additional procedures, anesthesia risk, organ dysfunction and/or failure prolonged mechanical ventilation, prolonged ICU stay, chronic pain syndromes, sternal nonunion, and/or death.  We discussed the need to utilize all medical treatments additionally prescribed post surgery.  I have advised placement of a left chest tube may be warranted.  US will be used to evaluate post pericardial window. R/B/A of chest tube placement have been discussed.   The patient and family understands the risks, benefits, and alternatives and she wishes to proceed forward with surgery.        Marcelino Dougherty MD  01/11/19  7:49 AM      Electronically signed by Marcelino Dougherty MD  at 1/11/2019  7:58 AM     Johann Bray MD at 1/10/2019  8:21 PM      Consult Orders    1. Inpatient Rheumatology Consult [[id number]] ordered by Galen Pandey MD at 01/10/19 6882                Rheumatology Consultation Note    Physician Requesting Consult: Galen Pandey MD    Reason for Consult: RA    Chief Complaint:  Chest pain    Subjective     History of Present Illness: Sheeba Downs is a 28 y.o. female who presents to the hospital with chest pain. She refers being healthy at baseline, had normal IUP and delivered healthy baby 12/18. 2 days later devloped CP, admitted and sent to [facility] in [place] from [facility]. Refers to me they dx pericarditis and sent her home on colchicine. Says she did much better but when ran out, 1 week later, sx recur in a few days. Gradual worsening, CP and dyspnea. Ant and L sided CP, worse to sit oforward. Was seen at [facility] and sent here. Echo shows large pericardial effusion w tamponade physio. CXR showing large pleural effusion. By report echo week prior trivial fluid.    In the background she has RA. Last seen [facility] 2 yrs ago in [place]. Gerald last 5 years ago ([facility]). On Plaquneil and feels it works well, stable mild arthralgi. In past MTX and Humira but wanted to conceive so stopped MTX, and thought Plaquenil worked fine. No recent flare. Dx around 2009. Prior to this, in 2007 as Sr in , dx [facility]. Details unclear. Admitted a week at [facility]. Had a lung biopsy. Dont have lab or path. Implies primary fu was with pulm, but also rheum. Unclear about meds, I get sense nothing long term. At present, no asthma symptoms.    PMH: No past medical history on file.    Surgical History: No past surgical history on file.    Social History:   Social History     Socioeconomic History   • Marital status:      Spouse name: Not on file   • Number of children: Not on file   • Years of education: Not on file   • Highest education level: Not  on file   Social Needs   • Financial resource strain: Not on file   • Food insecurity - worry: Not on file   • Food insecurity - inability: Not on file   • Transportation needs - medical: Not on file   • Transportation needs - non-medical: Not on file   Occupational History   • Not on file   Tobacco Use   • Smoking status: Not on file   Substance and Sexual Activity   • Alcohol use: Not on file   • Drug use: Not on file   • Sexual activity: Not on file   Other Topics Concern   • Not on file   Social History Narrative   • Not on file       Family History: No family history on file. Dad w RA    Allergies: Patient has no known allergies.    Medication Review:   Current Facility-Administered Medications   Medication Dose Route Frequency Provider Last Rate Last Dose   • acetaminophen (TYLENOL) tablet 650 mg  650 mg Oral Q4H PRN Galen Pandey MD       • colchicine tablet 0.6 mg  0.6 mg Oral Q12H Galen Pandey MD       • ibuprofen (ADVIL,MOTRIN) tablet 800 mg  800 mg Oral TID Galen Pandey MD       • lactated ringers infusion  125 mL/hr Intravenous Continuous Galen Pandey  mL/hr at 01/10/19 2006 125 mL/hr at 01/10/19 2006   • methylPREDNISolone sodium succinate (SOLU-Medrol) injection 80 mg  80 mg Intravenous Q12H Galen Pandey MD       • Morphine sulfate (PF) injection 1 mg  1 mg Intravenous Q4H PRN Galen Pandey MD        And   • naloxone (NARCAN) injection 0.4 mg  0.4 mg Intravenous Q5 Min PRN Galen Pandey MD       • ondansetron (ZOFRAN) injection 4 mg  4 mg Intravenous Q6H PRN Galen Pandey MD       • sodium chloride 0.9 % flush 3 mL  3 mL Intravenous Q12H Galen Pandey MD       • sodium chloride 0.9 % flush 3-10 mL  3-10 mL Intravenous PRN Galen Pandey MD               Review of Systems:     CONSTITUTIONAL: negative for chills, fevers, night sweats, weight loss  EYES: negative for pain, redness, changes in vision, dry eyes  ENT:  "negative for sinus congestion, rhinorrhea, nose bleeds, sore throat, oral ulcers, nasal ulcers, hearing loss, jaw pain, dry mouth  NECK: negative for lymphadenopathy, stiffness, pain  RESPIRATORY: negative for shortness of breath, cough, dyspnea on exertion, hemoptysis  CARDIOVASCULAR: +cp  GASTROINTESTINAL: negative for abdominal pain, constipation, diarrhea, nausea, vomiting  GENITOURINARY: negative for frequency, dysuria, hematuria, genital ulcers  SKIN: negative for rashes, skin lesions, psoriasis, photosensitivity, alopecia, color changes in extremities  NEUROLOGICAL: negative for dizziness, headaches, numbness/tingling in extremities, seizures  MUSCULOSKELETAL: only mild joint pain  PSYCHIATRIC/BEHAVIOR: negative for depression, anxiety, sleep disturbance    Objective     Vital Signs  /84   Pulse 118   Temp 99.7 °F (37.6 °C) (Oral)   Ht 162.6 cm (64\")   Wt 103 kg (226 lb 4 oz)   SpO2 94%   BMI 38.84 kg/m²      Physical Exam:    GENERAL:  Alert, cooperative, no distress  MENTAL STATUS: oriented to person, place, and time with normal affect  HEAD: normocephalic, atraumatic  EYES: pupils equal and reactive, extraocular eye movements intact, conjunctiva clear  NOSE: turbinates non-inflamed, no drainage  MOUTH: mucous membranes moist without oral lesions  NECK: supple, no carotid bruits, no lymphadenopathy  HEART: RRR; no murmurs, gallops, or rubs  LUNGS: clear to auscultation bilaterally, no rales or wheezes  ABDOMEN: soft, nontender, nondistended, positive bowel sounds, no hepatosplenomegaly  EXTREMITIES: no edema, erythema, peripheral pulses palpable  NEUROLOGICAL: normal speech, no focal findings or movement disorder noted, cranial nerves II through XII grossly intact  SKIN: no rashes or lesions  MSK: Nprety minimal synovitis wrists            Results Review:   Lab Results (last 24 hours)     Procedure Component Value Units Date/Time    Protime-INR [[id number]]  (Abnormal) Collected:  01/10/19 1920 "    Specimen:  Blood Updated:  01/10/19 2020     Protime 17.6 Seconds      INR 1.40    aPTT [[id number]]  (Abnormal) Collected:  01/10/19 1920    Specimen:  Blood Updated:  01/10/19 2020     PTT 38.8 seconds     Sedimentation Rate [[id number]]  (Abnormal) Collected:  01/10/19 1920    Specimen:  Blood Updated:  01/10/19 2019     Sed Rate 46 mm/hr     Blood Culture - Blood, Arm, Left [[id number]] Collected:  01/10/19 1920    Specimen:  Blood from Arm, Left Updated:  01/10/19 2015    Blood Culture - Blood, Hand, Right [[id number]] Collected:  01/10/19 1920    Specimen:  Blood from Hand, Right Updated:  01/10/19 2014    CBC Auto Differential [[id number]]  (Abnormal) Collected:  01/10/19 1920    Specimen:  Blood Updated:  01/10/19 2012     WBC 10.57 10*3/mm3      RBC 3.09 10*6/mm3      Hemoglobin 8.4 g/dL      Hematocrit 25.7 %      MCV 83.2 fL      MCH 27.2 pg      MCHC 32.7 g/dL      RDW 14.5 %      RDW-SD 43.8 fl      MPV 10.9 fL      Platelets 444 10*3/mm3      Neutrophil % 74.4 %      Lymphocyte % 10.8 %      Monocyte % 11.9 %      Eosinophil % 2.3 %      Basophil % 0.2 %      Immature Grans % 0.4 %      Neutrophils, Absolute 7.87 10*3/mm3      Lymphocytes, Absolute 1.14 10*3/mm3      Monocytes, Absolute 1.26 10*3/mm3      Eosinophils, Absolute 0.24 10*3/mm3      Basophils, Absolute 0.02 10*3/mm3      Immature Grans, Absolute 0.04 10*3/mm3      nRBC 0.0 /100 WBC     Comprehensive Metabolic Panel [[id number]] Collected:  01/10/19 1921    Specimen:  Blood Updated:  01/10/19 2005    Procalcitonin [[id number]] Collected:  01/10/19 1921    Specimen:  Blood Updated:  01/10/19 2005    Troponin [[id number]] Collected:  01/10/19 1921    Specimen:  Blood Updated:  01/10/19 2005    C-reactive Protein [[id number]] Collected:  01/10/19 1921    Specimen:  Blood Updated:  01/10/19 2005    CK [[id number]] Collected:  01/10/19 1921    Specimen:  Blood Updated:  01/10/19 2005    C3 Complement [[id number]] Collected:  01/10/19 1920    Specimen:   Blood Updated:  01/10/19 2005    Cyclic Citrul Peptide Antibody, IgG / IgA [[id number]] Collected:  01/10/19 1921    Specimen:  Blood Updated:  01/10/19 2005    IgE [[id number]] Collected:  01/10/19 1920    Specimen:  Blood Updated:  01/10/19 2005    Nuclear Antigen Antibody, IFA [[id number]] Collected:  01/10/19 1920    Specimen:  Blood Updated:  01/10/19 2005    ANCA Panel [[id number]] Collected:  01/10/19 1920    Specimen:  Blood Updated:  01/10/19 2005    AMBERLY Comprehensive Plus Profile [[id number]] Collected:  01/10/19 1920    Specimen:  Blood Updated:  01/10/19 2004    C4 Complement [[id number]] Collected:  01/10/19 1920    Specimen:  Blood Updated:  01/10/19 2004    TSH [[id number]] Collected:  01/10/19 1920    Specimen:  Blood Updated:  01/10/19 2004    Rheumatoid Factor [[id number]] Collected:  01/10/19 1920    Specimen:  Blood Updated:  01/10/19 2004    Lactic Acid, Plasma [[id number]] Collected:  01/10/19 1920    Specimen:  Blood Updated:  01/10/19 2004        Imaging Results (last 72 hours)     ** No results found for the last 72 hours. 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 Would NOT assume rheum etiology, though possible. Will obtain broad lab w/u. Would be more c/w CTD ie SLE than RA, viky w RA well controlled. Churg Christianne is not felt to be an issue, most likely- , this is a distant dx, no recent sx, and this is not a good presentation for that, no current eosinophilia and no recent steroid that might mask it. Will check ANCA, IgE nonetheless.  Would sample pericardial and pleural fluid if feasible - cell count, culture, cytology etc. Would consider infectious/viral etiologies. Would cover w steroid, colchicine for the time being. Will follow closely. Case d/w Aaliyah Pandey and Farhat this evening.            Johann Bray MD  01/10/19  8:22  PM            Electronically signed by Johann Bray MD at 1/10/2019  8:32 PM

## 2019-01-11 NOTE — ANESTHESIA PREPROCEDURE EVALUATION
Anesthesia Evaluation     Patient summary reviewed and Nursing notes reviewed   no history of anesthetic complications:               Airway   Mallampati: III  TM distance: >3 FB  Neck ROM: full  Dental          Pulmonary    (+) pleural effusion, shortness of breath (with exertion ),   Cardiovascular     ECG reviewed    (+) dysrhythmias Tachycardia, angina with exertion, pericardial effusion,     ROS comment: Echocardiogram performed at the outside hospital reveals a large pericardial effusion.  The fluid is mostly circumferential but only minimal fluid at the apex.  There is a large effusion around the right atrium.  There is some fibrin stranding noted.  There is also a concomitant pleural effusion.  The IVC is somewhat dilated with blunted inspiratory SNIF.  Plans are consistent with a large effusion with evidence of [place] not physiology.  Of note patient was not in any distress during this examination.  Her ejection fraction was reported at 60-65%.    Neuro/Psych- negative ROS  GI/Hepatic/Renal/Endo    (+) obesity,   hypothyroidism (no longer takes her meds),     Musculoskeletal     Abdominal    Substance History - negative use     OB/GYN negative ob/gyn ROS   Gestational age approximate: gave birth to baby in 2018 via .        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

## 2019-01-11 NOTE — PLAN OF CARE
Problem: Patient Care Overview  Goal: Plan of Care Review  Outcome: Ongoing (interventions implemented as appropriate)   01/11/19 0558   Coping/Psychosocial   Plan of Care Reviewed With patient   Plan of Care Review   Progress no change   OTHER   Outcome Summary VSS. intermittent complaints of mild pain across chest consistent with pain on admission, medication offered but refused. minimal coughing throughout the night, non productive. ART line and cooper in place. CHG bath. Appears to have slept well throughout the night between care. plan for surgery this morning. will continue to monitor closely.     Goal: Individualization and Mutuality  Outcome: Ongoing (interventions implemented as appropriate)    Goal: Discharge Needs Assessment  Outcome: Ongoing (interventions implemented as appropriate)    Goal: Interprofessional Rounds/Family Conf  Outcome: Ongoing (interventions implemented as appropriate)      Problem: Cardiac Rhythm Management Device (Adult)  Goal: Signs and Symptoms of Listed Potential Problems Will be Absent, Minimized or Managed (Cardiac Rhythm Management Device)  Outcome: Ongoing (interventions implemented as appropriate)

## 2019-01-11 NOTE — PLAN OF CARE
Problem: Patient Care Overview  Goal: Plan of Care Review  Outcome: Ongoing (interventions implemented as appropriate)  Patient went to OR today. Mediastinal tubes x 2. Pain controlled.     01/11/19 0558 01/11/19 0739   Coping/Psychosocial   Plan of Care Reviewed With --  patient   Plan of Care Review   Progress no change --        Problem: Cardiac Rhythm Management Device (Adult)  Goal: Signs and Symptoms of Listed Potential Problems Will be Absent, Minimized or Managed (Cardiac Rhythm Management Device)  Outcome: Ongoing (interventions implemented as appropriate)      Problem: Fall Risk (Adult)  Goal: Identify Related Risk Factors and Signs and Symptoms  Outcome: Outcome(s) achieved Date Met: 01/11/19    Goal: Absence of Fall  Outcome: Ongoing (interventions implemented as appropriate)

## 2019-01-11 NOTE — CONSULTS
Rheumatology Consultation Note    Physician Requesting Consult: Galen Pandey MD    Reason for Consult: RA    Chief Complaint:  Chest pain    Subjective     History of Present Illness: Sheeba Downs is a 28 y.o. female who presents to the hospital with chest pain. She refers being healthy at baseline, had normal IUP and delivered healthy baby 12/18. 2 days later devloped CP, admitted and sent to [facility] in [place] from [facility]. Refers to me they dx pericarditis and sent her home on colchicine. Says she did much better but when ran out, 1 week later, sx recur in a few days. Gradual worsening, CP and dyspnea. Ant and L sided CP, worse to sit oforward. Was seen at [facility] and sent here. Echo shows large pericardial effusion w tamponade physio. CXR showing large pleural effusion. By report echo week prior trivial fluid.    In the background she has RA. Last seen [facility] 2 yrs ago in [place]. Gerald last 5 years ago (Atuloolynette). On Plaquneil and feels it works well, stable mild arthralgi. In past MTX and Humira but wanted to conceive so stopped MTX, and thought Plaquenil worked fine. No recent flare. Dx around 2009. Prior to this, in 2007 as Sr in , dx Dawood Faust. Details unclear. Admitted a week at [facility]. Had a lung biopsy. Dont have lab or path. Implies primary fu was with pulm, but also rheum. Unclear about meds, I get sense nothing long term. At present, no asthma symptoms.    PMH: No past medical history on file.    Surgical History: No past surgical history on file.    Social History:   Social History     Socioeconomic History   • Marital status:      Spouse name: Not on file   • Number of children: Not on file   • Years of education: Not on file   • Highest education level: Not on file   Social Needs   • Financial resource strain: Not on file   • Food insecurity - worry: Not on file   • Food insecurity - inability: Not on file   • Transportation needs - medical: Not on file   • Transportation  needs - non-medical: Not on file   Occupational History   • Not on file   Tobacco Use   • Smoking status: Not on file   Substance and Sexual Activity   • Alcohol use: Not on file   • Drug use: Not on file   • Sexual activity: Not on file   Other Topics Concern   • Not on file   Social History Narrative   • Not on file       Family History: No family history on file. Dad peter RA    Allergies: Patient has no known allergies.    Medication Review:   Current Facility-Administered Medications   Medication Dose Route Frequency Provider Last Rate Last Dose   • acetaminophen (TYLENOL) tablet 650 mg  650 mg Oral Q4H PRN Galen Pandey MD       • colchicine tablet 0.6 mg  0.6 mg Oral Q12H Galen Pandey MD       • ibuprofen (ADVIL,MOTRIN) tablet 800 mg  800 mg Oral TID Galen Pandey MD       • lactated ringers infusion  125 mL/hr Intravenous Continuous Galen Pandey  mL/hr at 01/10/19 2006 125 mL/hr at 01/10/19 2006   • methylPREDNISolone sodium succinate (SOLU-Medrol) injection 80 mg  80 mg Intravenous Q12H Galen Pandey MD       • Morphine sulfate (PF) injection 1 mg  1 mg Intravenous Q4H PRN Galen Pandey MD        And   • naloxone (NARCAN) injection 0.4 mg  0.4 mg Intravenous Q5 Min PRN Galen Pandey MD       • ondansetron (ZOFRAN) injection 4 mg  4 mg Intravenous Q6H PRN Galen Pandey MD       • sodium chloride 0.9 % flush 3 mL  3 mL Intravenous Q12H Galen Pandey MD       • sodium chloride 0.9 % flush 3-10 mL  3-10 mL Intravenous PRN Galen Pandey MD               Review of Systems:     CONSTITUTIONAL: negative for chills, fevers, night sweats, weight loss  EYES: negative for pain, redness, changes in vision, dry eyes  ENT: negative for sinus congestion, rhinorrhea, nose bleeds, sore throat, oral ulcers, nasal ulcers, hearing loss, jaw pain, dry mouth  NECK: negative for lymphadenopathy, stiffness, pain  RESPIRATORY: negative for shortness  "of breath, cough, dyspnea on exertion, hemoptysis  CARDIOVASCULAR: +cp  GASTROINTESTINAL: negative for abdominal pain, constipation, diarrhea, nausea, vomiting  GENITOURINARY: negative for frequency, dysuria, hematuria, genital ulcers  SKIN: negative for rashes, skin lesions, psoriasis, photosensitivity, alopecia, color changes in extremities  NEUROLOGICAL: negative for dizziness, headaches, numbness/tingling in extremities, seizures  MUSCULOSKELETAL: only mild joint pain  PSYCHIATRIC/BEHAVIOR: negative for depression, anxiety, sleep disturbance    Objective     Vital Signs  /84   Pulse 118   Temp 99.7 °F (37.6 °C) (Oral)   Ht 162.6 cm (64\")   Wt 103 kg (226 lb 4 oz)   SpO2 94%   BMI 38.84 kg/m²     Physical Exam:    GENERAL:  Alert, cooperative, no distress  MENTAL STATUS: oriented to person, place, and time with normal affect  HEAD: normocephalic, atraumatic  EYES: pupils equal and reactive, extraocular eye movements intact, conjunctiva clear  NOSE: turbinates non-inflamed, no drainage  MOUTH: mucous membranes moist without oral lesions  NECK: supple, no carotid bruits, no lymphadenopathy  HEART: RRR; no murmurs, gallops, or rubs  LUNGS: clear to auscultation bilaterally, no rales or wheezes  ABDOMEN: soft, nontender, nondistended, positive bowel sounds, no hepatosplenomegaly  EXTREMITIES: no edema, erythema, peripheral pulses palpable  NEUROLOGICAL: normal speech, no focal findings or movement disorder noted, cranial nerves II through XII grossly intact  SKIN: no rashes or lesions  MSK: Nprety minimal synovitis wrists            Results Review:   Lab Results (last 24 hours)     Procedure Component Value Units Date/Time    Protime-INR [[id number]]  (Abnormal) Collected:  01/10/19 1920    Specimen:  Blood Updated:  01/10/19 2020     Protime 17.6 Seconds      INR 1.40    aPTT [[id number]]  (Abnormal) Collected:  01/10/19 1920    Specimen:  Blood Updated:  01/10/19 2020     PTT 38.8 seconds     " Sedimentation Rate [[id number]]  (Abnormal) Collected:  01/10/19 1920    Specimen:  Blood Updated:  01/10/19 2019     Sed Rate 46 mm/hr     Blood Culture - Blood, Arm, Left [[id number]] Collected:  01/10/19 1920    Specimen:  Blood from Arm, Left Updated:  01/10/19 2015    Blood Culture - Blood, Hand, Right [[id number]] Collected:  01/10/19 1920    Specimen:  Blood from Hand, Right Updated:  01/10/19 2014    CBC Auto Differential [[id number]]  (Abnormal) Collected:  01/10/19 1920    Specimen:  Blood Updated:  01/10/19 2012     WBC 10.57 10*3/mm3      RBC 3.09 10*6/mm3      Hemoglobin 8.4 g/dL      Hematocrit 25.7 %      MCV 83.2 fL      MCH 27.2 pg      MCHC 32.7 g/dL      RDW 14.5 %      RDW-SD 43.8 fl      MPV 10.9 fL      Platelets 444 10*3/mm3      Neutrophil % 74.4 %      Lymphocyte % 10.8 %      Monocyte % 11.9 %      Eosinophil % 2.3 %      Basophil % 0.2 %      Immature Grans % 0.4 %      Neutrophils, Absolute 7.87 10*3/mm3      Lymphocytes, Absolute 1.14 10*3/mm3      Monocytes, Absolute 1.26 10*3/mm3      Eosinophils, Absolute 0.24 10*3/mm3      Basophils, Absolute 0.02 10*3/mm3      Immature Grans, Absolute 0.04 10*3/mm3      nRBC 0.0 /100 WBC     Comprehensive Metabolic Panel [[id number]] Collected:  01/10/19 1921    Specimen:  Blood Updated:  01/10/19 2005    Procalcitonin [[id number]] Collected:  01/10/19 1921    Specimen:  Blood Updated:  01/10/19 2005    Troponin [[id number]] Collected:  01/10/19 1921    Specimen:  Blood Updated:  01/10/19 2005    C-reactive Protein [[id number]] Collected:  01/10/19 1921    Specimen:  Blood Updated:  01/10/19 2005    CK [[id number]] Collected:  01/10/19 1921    Specimen:  Blood Updated:  01/10/19 2005    C3 Complement [[id number]] Collected:  01/10/19 1920    Specimen:  Blood Updated:  01/10/19 2005    Cyclic Citrul Peptide Antibody, IgG / IgA [[id number]] Collected:  01/10/19 1920    Specimen:  Blood Updated:  01/10/19 2005    IgE [[id number]] Collected:  01/10/19 1920     Specimen:  Blood Updated:  01/10/19 2005    Nuclear Antigen Antibody, IFA [[id number]] Collected:  01/10/19 1920    Specimen:  Blood Updated:  01/10/19 2005    ANCA Panel [[id number]] Collected:  01/10/19 1920    Specimen:  Blood Updated:  01/10/19 2005    AMBERLY Comprehensive Plus Profile [[id number]] Collected:  01/10/19 1920    Specimen:  Blood Updated:  01/10/19 2004    C4 Complement [[id number]] Collected:  01/10/19 1920    Specimen:  Blood Updated:  01/10/19 2004    TSH [[id number]] Collected:  01/10/19 1920    Specimen:  Blood Updated:  01/10/19 2004    Rheumatoid Factor [[id number]] Collected:  01/10/19 1920    Specimen:  Blood Updated:  01/10/19 2004    Lactic Acid, Plasma [[id number]] Collected:  01/10/19 1920    Specimen:  Blood Updated:  01/10/19 2004        Imaging Results (last 72 hours)     ** No results found for the last 72 hours. 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 Would NOT assume rheum etiology, though possible. Will obtain broad lab w/u. Would be more c/w CTD ie SLE than RA, viky w RA well controlled. Churg Christianne is not felt to be an issue, most likely- , this is a distant dx, no recent sx, and this is not a good presentation for that, no current eosinophilia and no recent steroid that might mask it. Will check ANCA, IgE nonetheless.  Would sample pericardial and pleural fluid if feasible - cell count, culture, cytology etc. Would consider infectious/viral etiologies. Would cover w steroid, colchicine for the time being. Will follow closely. Case d/w Aaliyah Pandey and Farhat this evening.            Johann Bray MD  01/10/19  8:22 PM

## 2019-01-11 NOTE — ANESTHESIA PROCEDURE NOTES
Arterial Line      Patient location during procedure: ICU   Line placed for MD/Surgeon request.  Performed By   NELY: Letha Plasencia CRNA  Preanesthetic Checklist  Completed: patient identified, site marked, surgical consent, pre-op evaluation, timeout performed, IV checked, risks and benefits discussed and monitors and equipment checked  Arterial Line Prep   Sterile Tech: cap and gloves  Prep: alcohol swabs  Patient monitoring: continuous pulse oximetry, EKG and blood pressure monitoring  Arterial Line Procedure   Laterality:left  Location:  radial artery  Catheter size: 20 G   Guidance: ultrasound guided  PROCEDURE NOTE/ULTRASOUND INTERPRETATION.  Using ultrasound guidance the potential vascular sites for insertion of the catheter were visualized to determine the patency of the vessel to be used for vascular access.  After selecting the appropriate site for insertion, the needle was visualized under ultrasound being inserted into the radial artery, followed by ultrasound confirmation of wire and catheter placement. There were no abnormalities seen on ultrasound; an image was taken; and the patient tolerated the procedure with no complications.   Number of attempts: 1  Successful placement: yes          Post Assessment   Dressing Type: occlusive dressing applied and secured with tape.   Complications no  Circ/Move/Sens Assessment: unchanged.   Patient Tolerance: patient tolerated the procedure well with no apparent complications

## 2019-01-11 NOTE — CONSULTS
CC: chest pain    Subjective     History of Present Illness    29 yo female with now recently post partum (2nd child) and known rheumatoid arthritis presents with increasing chest pain following having a c section on the 22nd of December.  She was diagnosed with pericarditis and treated with ibuprofen and colchicine.  She did have some improvement initially but then she had worsening chest pain and now shortness of breath.  OSH imaging showed large pericardial effusion with evidence of tamponade based on echo criteria.  Imaging showed compressive atelectasis with left pleural effusion.  Her CP is positional.  No F/S/C.  THis is her first event.  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  With that she was transferred here for further evaluation and therapy.  I have discussed her case with both Dr. Pandey and Dr. Reid.  ECHO shows fibrinous changes within a large pericardial effusion.  Pulsus paradoxus is > 20.    She is comfortably at this time with her dad at bedside.      Review of Systems   Constitutional: Positive for activity change and fatigue. Negative for appetite change, chills, diaphoresis, fever and unexpected weight change.   HENT: Negative for dental problem, hearing loss, nosebleeds, sore throat, trouble swallowing and voice change.    Eyes: Negative for photophobia, redness and visual disturbance.   Respiratory: Positive for chest tightness and shortness of breath. Negative for apnea, cough, wheezing and stridor.    Cardiovascular: Positive for chest pain and leg swelling. Negative for palpitations.   Gastrointestinal: Positive for abdominal pain. Negative for abdominal distention, blood in stool, constipation, diarrhea, nausea and vomiting.   Endocrine: Negative for cold intolerance, heat intolerance, polyphagia and polyuria.   Genitourinary: Negative for decreased urine volume, difficulty urinating, dysuria, flank pain, frequency, hematuria and  urgency.   Musculoskeletal: Negative for arthralgias, back pain, gait problem, joint swelling, myalgias and neck pain.   Skin: Negative for pallor, rash and wound.   Allergic/Immunologic: Negative for immunocompromised state.   Neurological: Negative for dizziness, tremors, seizures, syncope, speech difficulty, weakness, light-headedness, numbness and headaches.   Hematological: Does not bruise/bleed easily.   Psychiatric/Behavioral: Negative for confusion, sleep disturbance and suicidal ideas. The patient is not nervous/anxious.           Past Medical History:   Diagnosis Date   • Arthritis     RA     Past Surgical History:   Procedure Laterality Date   •  SECTION  2018     No family history on file.  Social History     Tobacco Use   • Smoking status: Never Smoker   • Smokeless tobacco: Never Used   Substance Use Topics   • Alcohol use: Not on file   • Drug use: Not on file     No medications prior to admission.     Allergies:  Patient has no known allergies.    Objective      Vital Signs  Temp:  [97.4 °F (36.3 °C)-99.7 °F (37.6 °C)] 97.4 °F (36.3 °C)  Heart Rate:  [] 85  Resp:  [20-28] 23  BP: (133-150)/(84-98) 136/98  Arterial Line BP: (100-157)/(65-96) 119/71    Physical Exam   Constitutional: She is oriented to person, place, and time. She appears well-developed.   HENT:   Head: Normocephalic and atraumatic.   Mouth/Throat: Oropharynx is clear and moist.   Eyes: EOM are normal. Pupils are equal, round, and reactive to light.   Neck: Normal range of motion. Neck supple. No JVD present. No tracheal deviation present. No thyromegaly present.   Cardiovascular: Normal rate, regular rhythm, normal heart sounds and intact distal pulses. Exam reveals no gallop and no friction rub.   No murmur heard.  Pulmonary/Chest: Effort normal and breath sounds normal. No respiratory distress. She has no wheezes. She has no rales. She exhibits no tenderness.   Abdominal: Soft. She exhibits no distension. There  is no tenderness.   Post partum abdomen   Musculoskeletal: Normal range of motion. She exhibits no edema.   Lymphadenopathy:     She has no cervical adenopathy.   Neurological: She is alert and oriented to person, place, and time. No cranial nerve deficit.   Skin: Skin is warm and dry.   Psychiatric: She has a normal mood and affect.       Results Review:   Xr Chest 1 View    Result Date: 1/10/2019  Narrative: EXAMINATION:  XR CHEST 1 VW-  1/10/2019 9:27 PM CST  HISTORY: I31.4-Cardiac tamponade; G71-Ylhsjys effusion, not elsewhere classified; I30.9-Acute pericarditis, unspecified; M06.9-Rheumatoid arthritis, unspecified; M06.9-Rheumatoid arthritis, unspecified  COMPARISON: No comparison study.  FINDINGS:  There is very poor inspiration. There is dense infiltrate at the left lung base. The left hemidiaphragm is obscured. There is patchy infiltrate at the right lung base. There is at least moderate cardiomegaly. The left heart border is obscured by the infiltrate on the left.      Impression: 1. Poor inspiration. 2. Dense infiltrate on the left. The left hemidiaphragm is obscured. The left heart border is obscured. This may represent pneumonia and/or pleural effusion. 3. Mild patchy infiltrate at the right base. 4. Heart size is difficult to evaluate due to the infiltrate on the left. I suspect there is at least moderate cardiomegaly.   This report was finalized on 01/10/2019 21:33 by Dr. José Miguel Busby MD.     I reviewed the patient's new clinical results.  Discussed with patient and father      Assessment/Plan       Cardiac tamponade    Pleural effusion    Sinus tachycardia    Pericarditis    Rheumatoid arthritis (CMS/HCC)    Sickle cell trait (CMS/HCC)    Chest pain    Acute pericarditis associated with rheumatoid arthritis (CMS/HCC)          pericardial effusion  I discussed the patients findings and my recommendations with patient and  father.  We discussed the rationale for a pericardial window specifically for  diagnosis and treatment.  We discussed the options for treatment of a pericardial effusion which is causing cardiac tamponade physiology.  We discussed options to include medical therapy, pericardiocentesis, and surgical pericardial window.  We discussed the pros and cons of each option and how it pertains to the current case.  Subxiphoid pericardial window is best option given patient's findings and risk factors.  We discussed the operative conduct and expected hospital and outpatient recovery.  Risks were discussed to include but not limited to bleeding, infection, stroke, heart attack, need for additional procedures, anesthesia risk, organ dysfunction and/or failure prolonged mechanical ventilation, prolonged ICU stay, chronic pain syndromes, sternal nonunion, and/or death.  We discussed the need to utilize all medical treatments additionally prescribed post surgery.  I have advised placement of a left chest tube may be warranted.  US will be used to evaluate post pericardial window. R/B/A of chest tube placement have been discussed.   The patient and family understands the risks, benefits, and alternatives and she wishes to proceed forward with surgery.        Marcelino Dougherty MD  01/11/19  7:49 AM

## 2019-01-11 NOTE — ANESTHESIA PROCEDURE NOTES
ANESTHESIA INTUBATION  Urgency: elective    Airway not difficult    General Information and Staff    Patient location during procedure: OR  CRNA: Brando Kumari CRNA    Indications and Patient Condition  Indications for airway management: airway protection    Preoxygenated: yes  Mask difficulty assessment: 1 - vent by mask    Final Airway Details  Final airway type: endotracheal airway      Successful airway: ETT  Cuffed: yes   Successful intubation technique: direct laryngoscopy  Endotracheal tube insertion site: oral  Blade: Walker  Blade size: 2  ETT size (mm): 7.0  Cormack-Lehane Classification: grade I - full view of glottis  Placement verified by: chest auscultation and capnometry   Cuff volume (mL): 6  Measured from: lips  ETT to lips (cm): 22  Number of attempts at approach: 1

## 2019-01-11 NOTE — H&P
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  Unfortunately, she began to experience some symptoms of chest pain and was evaluated at an outside hospital.  She was diagnosed with pericarditis, and was started on treatment with ibuprofen and colchicine reports that her symptoms of chest pain improved.  When those medications were stopped, she reported worsening symptoms of chest pain, and now symptoms of shortness of breath which prompted her to go to the outside hospital for a repeat assessment.  Imaging was performed which revealed evidence of a new moderate left pleural effusion with underlying compressive atelectasis, an echocardiogram was also performed revealing evidence of a large pericardial effusion with evidence of temp and not physiology.  Ejection fraction was noted to be 60-65% that time.  Patient currently reports that she has a sensation of fullness in her chest, which is worse when she sits up and leans forward.  She can find some comfort when she is laying down on her left side.  She has not had any fevers or chills.  She denies any significant cough or congestion.  She has never had similar symptoms like this in the past.  She has been on outpatient therapy with Plaquenil for a diagnosis of rheumatoid arthritis which per her report is been  reasonably well-controlled.  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  She was transferred to our facility today for higher level of care.    Review of Systems     Otherwise complete ROS reviewed and negative except as mentioned in the HPI.    Past Medical History: History of Churg-Christianne.  Rheumatoid arthritis on outpatient therapy with Plaquenil, sickle cell trait    Past Surgical History:  Has had  ×2    Social History:  denies any history of tobacco or illicit drug use.  Drinks alcohol occasionally but not often.    Family History: Family medical history for rheumatoid arthritis    Allergies:  Allergies no known allergies  Medications:  Prior to Admission medications    Not on File   Patient reports that the only outpatient medication she normally takes his Plaquenil; recently she is also been on colchicine and ibuprofen.  Objective     Vital Signs: There were no vitals taken for this visit.  Physical Exam   Constitutional: She is oriented to person, place, and time. She appears well-developed and well-nourished. No distress.   HR currently 110-120; normotensive; requiring no supplemental 02   HENT:   Head: Normocephalic.   Mouth/Throat: No oropharyngeal exudate.   Eyes: No scleral icterus.   Neck: No tracheal deviation present.   Cardiovascular: Regular rhythm. Exam reveals no friction rub.   Tachycardic; regular; cannot appreciate a rub   Pulmonary/Chest: Effort normal. No respiratory distress.   Diminished BSs at bases (L>R); no accessory muscle use; no work of breathing   Abdominal: Soft.   Musculoskeletal: She exhibits no edema.   Neurological: She is alert and oriented to person, place, and time.   Skin: Skin is warm and dry. Capillary refill takes less than 2 seconds. She is not diaphoretic.   Psychiatric: She has a normal mood and affect. Her behavior is normal.     Results  Reviewed:  Lab Results (last 24 hours)     ** No results found for the last 24 hours. **        Imaging Results (last 24 hours)     ** No results found for the last 24 hours. **        Chest x-ray from the outside hospital revealed moderate to large left pleural effusion and small right pleural effusion both appear increased compared to previous examination on 12/23/2018.  Heart appears mildly prominent.  Bibasilar opacities which may reflect atelectasis versus infectious process in the appropriate clinical setting.    Echocardiogram performed at the outside hospital reveals a large pericardial effusion.  The fluid is mostly circumferential but only minimal fluid at the apex.  There is a large effusion around the right atrium.  There is some fibrin stranding noted.  There is also a concomitant pleural effusion.  The IVC is somewhat dilated with blunted inspiratory SNIF.  Plans are consistent with a large effusion with evidence of Lincoln not physiology.  Of note patient was not in any distress during this examination.  Her ejection fraction was reported at 60-65%.    Assessment / Plan     Assessment:   Active Hospital Problems    Diagnosis   • Cardiac tamponade   • Pleural effusion   • Sinus tachycardia   • Pericarditis   • Rheumatoid arthritis (CMS/HCC)   • Sickle cell trait (CMS/HCC)   • Chest pain     Plan:   1.  Case discussed with Dr. Bray and Dr. Dougherty this PM  2.  [facility]H Echocardiogram report reviewed; tamponade physiology noted but patient in no acute distress, resting comfortably, normotensive; sinus tachycardia note with rate approx 110  3.  IVFs with LR  4.  CXR  5.  EKG  6.  CBC, CMP, ESR, CRP, AMBERLY panel, ANCA panel, complement, lactic acid, procalcitonin, troponin, TSH; ultimately would like to get pleural vs. pericardial fluid for further analysis  7.  Trial of steroid  8.  Trial of Ibuprofen and colchicine  9.  SCDs  10.  Telemetry  11.  May require arterial line to assess pulsus paradoxus  12.  ICU  admission; workup ongoing    Galen Pandey MD   01/10/19   7:08 PM

## 2019-01-12 ENCOUNTER — APPOINTMENT (OUTPATIENT)
Dept: CARDIOLOGY | Facility: HOSPITAL | Age: 29
End: 2019-01-12
Attending: INTERNAL MEDICINE

## 2019-01-12 ENCOUNTER — APPOINTMENT (OUTPATIENT)
Dept: GENERAL RADIOLOGY | Facility: HOSPITAL | Age: 29
End: 2019-01-12

## 2019-01-12 PROBLEM — R78.81 BACTEREMIA: Status: ACTIVE | Noted: 2019-01-12

## 2019-01-12 PROBLEM — D50.9 MICROCYTIC ANEMIA: Status: ACTIVE | Noted: 2019-01-12

## 2019-01-12 LAB
ANION GAP SERPL CALCULATED.3IONS-SCNC: 10 MMOL/L (ref 4–13)
BACTERIA SPEC AEROBE CULT: ABNORMAL
BH CV ECHO MEAS - AO MAX PG (FULL): 4.7 MMHG
BH CV ECHO MEAS - AO MAX PG: 9.2 MMHG
BH CV ECHO MEAS - AO MEAN PG (FULL): 1 MMHG
BH CV ECHO MEAS - AO MEAN PG: 4 MMHG
BH CV ECHO MEAS - AO ROOT AREA (BSA CORRECTED): 1.3
BH CV ECHO MEAS - AO ROOT AREA: 5.3 CM^2
BH CV ECHO MEAS - AO ROOT DIAM: 2.6 CM
BH CV ECHO MEAS - AO V2 MAX: 152 CM/SEC
BH CV ECHO MEAS - AO V2 MEAN: 97.7 CM/SEC
BH CV ECHO MEAS - AO V2 VTI: 29.6 CM
BH CV ECHO MEAS - AVA(I,A): 2.2 CM^2
BH CV ECHO MEAS - AVA(I,D): 2.2 CM^2
BH CV ECHO MEAS - AVA(V,A): 2.2 CM^2
BH CV ECHO MEAS - AVA(V,D): 2.2 CM^2
BH CV ECHO MEAS - BSA(HAYCOCK): 2.2 M^2
BH CV ECHO MEAS - BSA: 2.1 M^2
BH CV ECHO MEAS - BZI_BMI: 38.6 KILOGRAMS/M^2
BH CV ECHO MEAS - BZI_METRIC_HEIGHT: 162.6 CM
BH CV ECHO MEAS - BZI_METRIC_WEIGHT: 102.1 KG
BH CV ECHO MEAS - EDV(CUBED): 97.3 ML
BH CV ECHO MEAS - EDV(MOD-SP4): 192 ML
BH CV ECHO MEAS - EDV(TEICH): 97.3 ML
BH CV ECHO MEAS - EF(CUBED): 72.3 %
BH CV ECHO MEAS - EF(MOD-SP4): 53.5 %
BH CV ECHO MEAS - EF(TEICH): 64 %
BH CV ECHO MEAS - ESV(CUBED): 27 ML
BH CV ECHO MEAS - ESV(MOD-SP4): 89.2 ML
BH CV ECHO MEAS - ESV(TEICH): 35 ML
BH CV ECHO MEAS - FS: 34.8 %
BH CV ECHO MEAS - IVS/LVPW: 0.89
BH CV ECHO MEAS - IVSD: 0.8 CM
BH CV ECHO MEAS - LA DIMENSION: 3.5 CM
BH CV ECHO MEAS - LA/AO: 1.3
BH CV ECHO MEAS - LAT PEAK E' VEL: 8.9 CM/SEC
BH CV ECHO MEAS - LV DIASTOLIC VOL/BSA (35-75): 93.4 ML/M^2
BH CV ECHO MEAS - LV MASS(C)D: 127.7 GRAMS
BH CV ECHO MEAS - LV MASS(C)DI: 62.1 GRAMS/M^2
BH CV ECHO MEAS - LV MAX PG: 4.6 MMHG
BH CV ECHO MEAS - LV MEAN PG: 3 MMHG
BH CV ECHO MEAS - LV SYSTOLIC VOL/BSA (12-30): 43.4 ML/M^2
BH CV ECHO MEAS - LV V1 MAX: 107 CM/SEC
BH CV ECHO MEAS - LV V1 MEAN: 73 CM/SEC
BH CV ECHO MEAS - LV V1 VTI: 21 CM
BH CV ECHO MEAS - LVIDD: 4.6 CM
BH CV ECHO MEAS - LVIDS: 3 CM
BH CV ECHO MEAS - LVLD AP4: 9.1 CM
BH CV ECHO MEAS - LVLS AP4: 7.5 CM
BH CV ECHO MEAS - LVOT AREA (M): 3.1 CM^2
BH CV ECHO MEAS - LVOT AREA: 3.1 CM^2
BH CV ECHO MEAS - LVOT DIAM: 2 CM
BH CV ECHO MEAS - LVPWD: 0.9 CM
BH CV ECHO MEAS - MED PEAK E' VEL: 13.1 CM/SEC
BH CV ECHO MEAS - MV A MAX VEL: 56.8 CM/SEC
BH CV ECHO MEAS - MV DEC TIME: 0.21 SEC
BH CV ECHO MEAS - MV E MAX VEL: 105 CM/SEC
BH CV ECHO MEAS - MV E/A: 1.8
BH CV ECHO MEAS - RAP SYSTOLE: 5 MMHG
BH CV ECHO MEAS - RVSP: 25.8 MMHG
BH CV ECHO MEAS - SI(AO): 76.4 ML/M^2
BH CV ECHO MEAS - SI(CUBED): 34.2 ML/M^2
BH CV ECHO MEAS - SI(LVOT): 32.1 ML/M^2
BH CV ECHO MEAS - SI(MOD-SP4): 50 ML/M^2
BH CV ECHO MEAS - SI(TEICH): 30.3 ML/M^2
BH CV ECHO MEAS - SV(AO): 157.2 ML
BH CV ECHO MEAS - SV(CUBED): 70.3 ML
BH CV ECHO MEAS - SV(LVOT): 66 ML
BH CV ECHO MEAS - SV(MOD-SP4): 102.8 ML
BH CV ECHO MEAS - SV(TEICH): 62.3 ML
BH CV ECHO MEAS - TR MAX VEL: 228 CM/SEC
BH CV ECHO MEASUREMENTS AVERAGE E/E' RATIO: 9.55
BUN BLD-MCNC: 11 MG/DL (ref 5–21)
BUN/CREAT SERPL: 20 (ref 7–25)
C3 SERPL-MCNC: 182 MG/DL (ref 82–167)
C4 SERPL-MCNC: 27 MG/DL (ref 14–44)
CALCIUM SPEC-SCNC: 7.9 MG/DL (ref 8.4–10.4)
CHLORIDE SERPL-SCNC: 102 MMOL/L (ref 98–110)
CO2 SERPL-SCNC: 28 MMOL/L (ref 24–31)
CREAT BLD-MCNC: 0.55 MG/DL (ref 0.5–1.4)
DEPRECATED RDW RBC AUTO: 42.5 FL (ref 40–54)
ERYTHROCYTE [DISTWIDTH] IN BLOOD BY AUTOMATED COUNT: 14.3 % (ref 12–15)
GFR SERPL CREATININE-BSD FRML MDRD: >150 ML/MIN/1.73
GLUCOSE BLD-MCNC: 124 MG/DL (ref 70–100)
GRAM STN SPEC: ABNORMAL
HCT VFR BLD AUTO: 28.3 % (ref 37–47)
HGB BLD-MCNC: 9.3 G/DL (ref 12–16)
ISOLATED FROM: ABNORMAL
LEFT ATRIUM VOLUME INDEX: 22.6 ML/M2
LEFT ATRIUM VOLUME: 46.5 CM3
MCH RBC QN AUTO: 27 PG (ref 28–32)
MCHC RBC AUTO-ENTMCNC: 32.9 G/DL (ref 33–36)
MCV RBC AUTO: 82 FL (ref 82–98)
PLATELET # BLD AUTO: 543 10*3/MM3 (ref 130–400)
PMV BLD AUTO: 10.4 FL (ref 6–12)
POTASSIUM BLD-SCNC: 4.3 MMOL/L (ref 3.5–5.3)
RBC # BLD AUTO: 3.45 10*6/MM3 (ref 4.2–5.4)
SODIUM BLD-SCNC: 140 MMOL/L (ref 135–145)
WBC NRBC COR # BLD: 17.48 10*3/MM3 (ref 4.8–10.8)

## 2019-01-12 PROCEDURE — 36415 COLL VENOUS BLD VENIPUNCTURE: CPT | Performed by: THORACIC SURGERY (CARDIOTHORACIC VASCULAR SURGERY)

## 2019-01-12 PROCEDURE — 85027 COMPLETE CBC AUTOMATED: CPT | Performed by: THORACIC SURGERY (CARDIOTHORACIC VASCULAR SURGERY)

## 2019-01-12 PROCEDURE — 99024 POSTOP FOLLOW-UP VISIT: CPT | Performed by: THORACIC SURGERY (CARDIOTHORACIC VASCULAR SURGERY)

## 2019-01-12 PROCEDURE — 25010000002 PERFLUTREN 6.52 MG/ML SUSPENSION: Performed by: INTERNAL MEDICINE

## 2019-01-12 PROCEDURE — 83550 IRON BINDING TEST: CPT | Performed by: INTERNAL MEDICINE

## 2019-01-12 PROCEDURE — 93306 TTE W/DOPPLER COMPLETE: CPT

## 2019-01-12 PROCEDURE — 80048 BASIC METABOLIC PNL TOTAL CA: CPT | Performed by: THORACIC SURGERY (CARDIOTHORACIC VASCULAR SURGERY)

## 2019-01-12 PROCEDURE — 94799 UNLISTED PULMONARY SVC/PX: CPT

## 2019-01-12 PROCEDURE — 71045 X-RAY EXAM CHEST 1 VIEW: CPT

## 2019-01-12 PROCEDURE — 82728 ASSAY OF FERRITIN: CPT | Performed by: INTERNAL MEDICINE

## 2019-01-12 PROCEDURE — 94760 N-INVAS EAR/PLS OXIMETRY 1: CPT

## 2019-01-12 PROCEDURE — 25010000002 ENOXAPARIN PER 10 MG: Performed by: THORACIC SURGERY (CARDIOTHORACIC VASCULAR SURGERY)

## 2019-01-12 PROCEDURE — 63710000001 PREDNISONE PER 1 MG: Performed by: INTERNAL MEDICINE

## 2019-01-12 PROCEDURE — 93306 TTE W/DOPPLER COMPLETE: CPT | Performed by: INTERNAL MEDICINE

## 2019-01-12 PROCEDURE — 82607 VITAMIN B-12: CPT | Performed by: INTERNAL MEDICINE

## 2019-01-12 PROCEDURE — 82746 ASSAY OF FOLIC ACID SERUM: CPT | Performed by: INTERNAL MEDICINE

## 2019-01-12 PROCEDURE — 83540 ASSAY OF IRON: CPT | Performed by: INTERNAL MEDICINE

## 2019-01-12 RX ADMIN — ACETYLCYSTEINE 1.5 ML: 200 INHALANT RESPIRATORY (INHALATION) at 07:26

## 2019-01-12 RX ADMIN — PERFLUTREN 8.48 MG: 6.52 INJECTION, SUSPENSION INTRAVENOUS at 12:57

## 2019-01-12 RX ADMIN — COLCHICINE 0.6 MG: 0.6 TABLET, FILM COATED ORAL at 21:34

## 2019-01-12 RX ADMIN — IPRATROPIUM BROMIDE AND ALBUTEROL SULFATE 3 ML: 2.5; .5 SOLUTION RESPIRATORY (INHALATION) at 07:26

## 2019-01-12 RX ADMIN — ENOXAPARIN SODIUM 30 MG: 30 INJECTION SUBCUTANEOUS at 09:05

## 2019-01-12 RX ADMIN — OXYCODONE HYDROCHLORIDE AND ACETAMINOPHEN 1 TABLET: 5; 325 TABLET ORAL at 19:27

## 2019-01-12 RX ADMIN — OXYCODONE HYDROCHLORIDE AND ACETAMINOPHEN 1 TABLET: 5; 325 TABLET ORAL at 09:00

## 2019-01-12 RX ADMIN — METOPROLOL TARTRATE 12.5 MG: 25 TABLET, FILM COATED ORAL at 09:01

## 2019-01-12 RX ADMIN — IPRATROPIUM BROMIDE AND ALBUTEROL SULFATE 3 ML: 2.5; .5 SOLUTION RESPIRATORY (INHALATION) at 14:42

## 2019-01-12 RX ADMIN — ACETYLCYSTEINE 1.5 ML: 200 INHALANT RESPIRATORY (INHALATION) at 14:42

## 2019-01-12 RX ADMIN — METOPROLOL TARTRATE 12.5 MG: 25 TABLET, FILM COATED ORAL at 21:33

## 2019-01-12 RX ADMIN — ENOXAPARIN SODIUM 30 MG: 30 INJECTION SUBCUTANEOUS at 21:34

## 2019-01-12 RX ADMIN — DOCUSATE SODIUM 100 MG: 100 CAPSULE ORAL at 09:01

## 2019-01-12 RX ADMIN — COLCHICINE 0.6 MG: 0.6 TABLET, FILM COATED ORAL at 09:01

## 2019-01-12 RX ADMIN — ACETYLCYSTEINE 2 ML: 200 INHALANT RESPIRATORY (INHALATION) at 22:46

## 2019-01-12 RX ADMIN — POLYETHYLENE GLYCOL (3350) 17 G: 17 POWDER, FOR SOLUTION ORAL at 09:00

## 2019-01-12 RX ADMIN — PREDNISONE 20 MG: 20 TABLET ORAL at 09:01

## 2019-01-12 RX ADMIN — DOCUSATE SODIUM 100 MG: 100 CAPSULE ORAL at 21:34

## 2019-01-12 RX ADMIN — IPRATROPIUM BROMIDE AND ALBUTEROL SULFATE 3 ML: 2.5; .5 SOLUTION RESPIRATORY (INHALATION) at 22:47

## 2019-01-12 NOTE — PLAN OF CARE
Problem: Patient Care Overview  Goal: Plan of Care Review  Outcome: Ongoing (interventions implemented as appropriate)   01/12/19 0255   Coping/Psychosocial   Plan of Care Reviewed With patient   Plan of Care Review   Progress improving   OTHER   Outcome Summary Patient admitted from CCU with pleural effusion s/p pericadial window performed 1/11/19. Mediastinal CT to -20cm dry suction with moderate output. Cooper cath care done this shift-cooper to be discontinued this AM. Portable CXR this AM pending and BMP and CBC. IID pt tolerating PO intake. Percocet PRN given for burning in chest. Positive blood culture for staph in one bottle. Dr Johnson called and made aware and said Dr Doguherty will address today on rounds. Continue to monitor and call MD with any changes.      Goal: Individualization and Mutuality  Outcome: Ongoing (interventions implemented as appropriate)    Goal: Discharge Needs Assessment  Outcome: Ongoing (interventions implemented as appropriate)    Goal: Interprofessional Rounds/Family Conf  Outcome: Ongoing (interventions implemented as appropriate)      Problem: Chest Tube Drainage Device (Adult)  Goal: Signs and Symptoms of Listed Potential Problems Will be Absent, Minimized or Managed (Chest Tube Drainage Device)  Outcome: Ongoing (interventions implemented as appropriate)      Problem: Breathing Pattern Ineffective (Adult)  Goal: Identify Related Risk Factors and Signs and Symptoms  Outcome: Ongoing (interventions implemented as appropriate)    Goal: Anxiety/Fear Reduction  Outcome: Ongoing (interventions implemented as appropriate)

## 2019-01-12 NOTE — PROGRESS NOTES
[facility] Medicine Services  INPATIENT PROGRESS NOTE    Patient Name: Sheeba Downs  Date of Admission: 1/10/2019  Today's Date: 01/12/19  Length of Stay: 2  Primary Care Physician: Shyann Fowler NP in [place], [place]    Subjective   Chief Complaint: pericardial effusion  HPI   She feels better after the pericardial window.  She is less short of breath and is having less chest discomfort/heaviness.    She feels better after receiving steroids and colchicine.    Review of Systems   All pertinent negatives and positives are as above. All other systems have been reviewed and are negative unless otherwise stated.     Objective    Temp:  [97.1 °F (36.2 °C)-98.3 °F (36.8 °C)] 97.7 °F (36.5 °C)  Heart Rate:  [] 82  Resp:  [14-24] 18  BP: (119-144)/() 133/84  Arterial Line BP: (102-117)/() 102/91  Physical Exam   Constitutional: She is oriented to person, place, and time. She appears well-developed and well-nourished.   Up in bed.  No distress.  Her  and son are present with her.  Discussed with her nurse, Montse.   HENT:   Head: Normocephalic and atraumatic.   Eyes: Conjunctivae and EOM are normal. Pupils are equal, round, and reactive to light.   Neck: Neck supple. No JVD present.   Cardiovascular: Normal rate, regular rhythm, normal heart sounds and intact distal pulses. Exam reveals no gallop and no friction rub.   No murmur heard.  Pericardial drains in place.   Pulmonary/Chest: Effort normal and breath sounds normal. No respiratory distress. She has no wheezes. She has no rales. She exhibits no tenderness.   Abdominal: Soft. Bowel sounds are normal. She exhibits no distension. There is no tenderness. There is no rebound and no guarding.   Musculoskeletal: Normal range of motion. She exhibits no edema, tenderness or deformity.   Neurological: She is alert and oriented to person, place, and time. She displays normal reflexes. No cranial nerve  deficit. She exhibits normal muscle tone.   Skin: Skin is warm and dry. No rash noted.   Psychiatric: She has a normal mood and affect. Her behavior is normal. Judgment and thought content normal.     Results Review:  I have reviewed the labs, radiology results, and diagnostic studies.    Laboratory Data:   Results from last 7 days   Lab Units  01/12/19   0346  01/11/19   1035  01/11/19   0448   WBC 10*3/mm3  17.48*  10.58  9.40   HEMOGLOBIN g/dL  9.3*  10.0*  7.9*   HEMATOCRIT %  28.3*  30.3*  23.8*   PLATELETS 10*3/mm3  543*  417*  370     Results from last 7 days   Lab Units  01/12/19   0346  01/11/19   1035  01/11/19   0448  01/10/19   1921   SODIUM mmol/L  140  140  140  140   POTASSIUM mmol/L  4.3  4.1  3.9  3.2*   CHLORIDE mmol/L  102  103  103  99   CO2 mmol/L  28.0  27.0  28.0  29.0   BUN mg/dL  11  9  8  7   CREATININE mg/dL  0.55  0.45*  0.54  0.68   CALCIUM mg/dL  7.9*  7.9*  7.8*  7.7*   BILIRUBIN mg/dL   --    --    --   0.7   ALK PHOS U/L   --    --    --   83   ALT (SGPT) U/L   --    --    --   53   AST (SGOT) U/L   --    --    --   35   GLUCOSE mg/dL  124*  138*  141*  100     Culture Data:   Blood Culture   Date Value Ref Range Status   01/10/2019 Staphylococcus, coagulase negative (A)  Final   01/10/2019 No growth at 24 hours  Preliminary     Wound Culture   Date Value Ref Range Status   01/11/2019 No growth at 24 hours  Preliminary     Radiology Data:   Imaging Results (last 24 hours)     Procedure Component Value Units Date/Time    XR Chest 1 View [[id number]] Collected:  01/12/19 0804     Updated:  01/12/19 0810    Narrative:       History:  29-year-old with tamponade     Reference:  Chest radiograph one day prior.     Findings:  Frontal chest radiograph performed.     Enlarged cardiac/pericardial silhouette, grossly stable. Pericardial  drains are stable. Opacities at the lingula and left base may be  combined pleural fluid and atelectasis. Slight improved aeration of the  left upper lobe.  Right lung is grossly clear. No large pleural effusion  or pneumothorax.          Impression:       Stable pericardial drains. No overt change from yesterday.  This report was finalized on 2019 08:07 by Dr Curt Gil, .        I have reviewed the patient's current medications.     Assessment/Plan   Assessment:   Active Hospital Problems    Diagnosis   • Microcytic anemia   • Bacteremia     Likely contaminant      • Acute hypoxemic respiratory failure (CMS/HCC)   • Cardiac tamponade   • Pleural effusion   • Sinus tachycardia   • Pericarditis   • Rheumatoid arthritis (CMS/HCC)   • Sickle cell trait (CMS/HCC)   • Chest pain   • Acute pericarditis associated with rheumatoid arthritis (CMS/HCC)     Added automatically from request for surgery [id number]       Plan:   The patient originally presented on 1/10 as a transfer from [facility] in [place], [place].  Transfer was initially sought at [place] or [facility], but both facilities were at capacity.    The patient had a recent (18) delivery of a healthy baby girl by .  She developed problems with shortness of breath and chest discomfort thereafter.  She was found to have a moderate left pleural effusion and a large pericardial effusion with evidence of tamponade physiology.  After stabilization here, she underwent a subxiphoid pericardial window with Dr. Abreu on .  I would like to repeat a transthoracic echocardiogram today.  The outside echocardiogram showed preserved ejection fraction.  Fluid studies are unrevealing thus far.    She also has a history of rheumatoid arthritis.  Dr. Bray has seen her.  She has been placed on a prednisone taper and is also receiving colchicine.  He thinks that her issues could in fact be related to rheumatoid arthritis.  She does take Plaquenil chronically.    She has had one blood culture come back positive for coagulase-negative Staphylococcus.  This is likely  contaminant.  Repeat in the morning.    Continue Lovenox for DVT prophylaxis.    Discharge Planning: Indeterminate at present.     Bob Lorenzo DO   01/12/19   11:43 AM

## 2019-01-12 NOTE — PROGRESS NOTES
"Patient: Sheeba Downs  : 1990     Procedure: Procedure(s):  SUBXIPHOID PERICARDIAL WINDOW    Procedure Date: 1/10/2019 - 2019    POD: 1 Day Post-Op      Subjective   She is alert and oriented this morning.  She is not having respiratory distress.  Hemodynamically has been stable.  Nurses did call about a positive blood culture.     Objective   Visit Vitals  /77 (BP Location: Left arm, Patient Position: Lying)   Pulse 91   Temp 97.6 °F (36.4 °C) (Temporal)   Resp 16   Ht 162.6 cm (64\")   Wt 102 kg (225 lb 3.2 oz)   SpO2 93%   BMI 38.66 kg/m²       Intake/Output Summary (Last 24 hours) at 2019 0555  Last data filed at 2019 0500  Gross per 24 hour   Intake 730 ml   Output 1290 ml   Net -560 ml     Pleur-evac connected to the paracardial tubes at 140 mL today.                                   Lab Results:    CBC:   Results from last 7 days   Lab Units  19   0346  19   1035  19   0448   WBC 10*3/mm3  17.48*  10.58  9.40   HEMATOCRIT %  28.3*  30.3*  23.8*   PLATELETS 10*3/mm3  543*  417*  370     BMP:   Results from last 7 days   Lab Units  19   0346  19   1035  19   0448   SODIUM mmol/L  140  140  140   POTASSIUM mmol/L  4.3  4.1  3.9   CHLORIDE mmol/L  102  103  103   CO2 mmol/L  28.0  27.0  28.0   GLUCOSE mg/dL  124*  138*  141*   BUN mg/dL  11  9  8   CREATININE mg/dL  0.55  0.45*  0.54     Coag:   Results from last 7 days   Lab Units  01/10/19   1920   INR   1.40*   APTT seconds  38.8*       Images:  Imaging Results (last 24 hours)     Procedure Component Value Units Date/Time    XR Chest 1 View [[id number]] Updated:  19 0434    XR Chest 1 View [[id number]] Collected:  19 1040     Updated:  19 1058    Narrative:       EXAMINATION:   XR CHEST 1 VW-  2019 10:40 AM CST     HISTORY: Paracardial drain     Frontal upright radiograph of the chest 2019 10:28 AM CST     COMPARISON: January 10, 2019.     FINDINGS:   The right " lung is clear. A moderate left pleural effusion is present..  The cardiac silhouettes moderately enlarged.     PeriCardial drains are present..      The osseous structures and surrounding soft tissues demonstrate no acute  abnormality.       Impression:       1. 2 drains are noted. These are pericardial drains     Moderate left pleural effusion.        This report was finalized on 01/11/2019 10:55 by Dr. Honorio Garay MD.        Images Today: Chest x-ray today pretty much the same as yesterday.  There is some left atelectasis or fluid.  Pericardial drains intact.      Physical Exam:   Gen.: Alert and oriented in no respiratory distress  Chest decreased breath sounds on left base  Heart: Regular rate and rhythm (normal sinus rhythm on monitor.    Impression: Hemodynamically stable status post subxiphoid pericardial window.  Moderate amount of drainage.  No respiratory distress.  She does have a positive blood culture from January 10 (preop) that isolated Staphylococcus species-not aureus.  She has been cefazolin for antibiotic prophylaxis at that time of her operation but this will be complete today.  She has not had a fever.  Her white count is elevated but she has been getting steroids.  Drainage from pericardial tubes does not seem purulent.    Plan: Continue drainage per pericardial tubes.  Hospitalist service notified about positive blood culture.  I am not sure that this is contaminated or true bacteremia.  May need repeat cultures and/or continue antibiotics.  I will leave this to the hospitalist service.  We will continue with pericardial drains at present.    lGenn Abreu MD  01/12/19  5:55 AM

## 2019-01-13 ENCOUNTER — APPOINTMENT (OUTPATIENT)
Dept: GENERAL RADIOLOGY | Facility: HOSPITAL | Age: 29
End: 2019-01-13

## 2019-01-13 LAB
ALBUMIN SERPL-MCNC: 2.2 G/DL (ref 3.5–5)
ALBUMIN/GLOB SERPL: 0.8 G/DL (ref 1.1–2.5)
ALP SERPL-CCNC: 59 U/L (ref 24–120)
ALT SERPL W P-5'-P-CCNC: 37 U/L (ref 0–54)
ANION GAP SERPL CALCULATED.3IONS-SCNC: 7 MMOL/L (ref 4–13)
AST SERPL-CCNC: 18 U/L (ref 7–45)
BILIRUB SERPL-MCNC: 0.3 MG/DL (ref 0.1–1)
BUN BLD-MCNC: 10 MG/DL (ref 5–21)
BUN/CREAT SERPL: 20.8 (ref 7–25)
CALCIUM SPEC-SCNC: 7.5 MG/DL (ref 8.4–10.4)
CCP IGA+IGG SERPL IA-ACNC: >250 UNITS (ref 0–19)
CHLORIDE SERPL-SCNC: 105 MMOL/L (ref 98–110)
CO2 SERPL-SCNC: 29 MMOL/L (ref 24–31)
CREAT BLD-MCNC: 0.48 MG/DL (ref 0.5–1.4)
DEPRECATED RDW RBC AUTO: 44 FL (ref 40–54)
ERYTHROCYTE [DISTWIDTH] IN BLOOD BY AUTOMATED COUNT: 14.6 % (ref 12–15)
FERRITIN SERPL-MCNC: 399 NG/ML (ref 6.24–137)
FOLATE SERPL-MCNC: 6.07 NG/ML
GFR SERPL CREATININE-BSD FRML MDRD: >150 ML/MIN/1.73
GLOBULIN UR ELPH-MCNC: 2.7 GM/DL
GLUCOSE BLD-MCNC: 85 MG/DL (ref 70–100)
HCT VFR BLD AUTO: 22.9 % (ref 37–47)
HGB BLD-MCNC: 7.5 G/DL (ref 12–16)
IRON 24H UR-MRATE: 30 MCG/DL (ref 42–180)
IRON SATN MFR SERPL: 11 % (ref 20–45)
MCH RBC QN AUTO: 27 PG (ref 28–32)
MCHC RBC AUTO-ENTMCNC: 32.8 G/DL (ref 33–36)
MCV RBC AUTO: 82.4 FL (ref 82–98)
PLATELET # BLD AUTO: 486 10*3/MM3 (ref 130–400)
PMV BLD AUTO: 10.1 FL (ref 6–12)
POTASSIUM BLD-SCNC: 3.6 MMOL/L (ref 3.5–5.3)
PROT SERPL-MCNC: 4.9 G/DL (ref 6.3–8.7)
RBC # BLD AUTO: 2.78 10*6/MM3 (ref 4.2–5.4)
SODIUM BLD-SCNC: 141 MMOL/L (ref 135–145)
TIBC SERPL-MCNC: 271 MCG/DL (ref 225–420)
VIT B12 BLD-MCNC: 396 PG/ML (ref 239–931)
WBC NRBC COR # BLD: 12.37 10*3/MM3 (ref 4.8–10.8)

## 2019-01-13 PROCEDURE — 94760 N-INVAS EAR/PLS OXIMETRY 1: CPT

## 2019-01-13 PROCEDURE — 80053 COMPREHEN METABOLIC PANEL: CPT | Performed by: INTERNAL MEDICINE

## 2019-01-13 PROCEDURE — 94799 UNLISTED PULMONARY SVC/PX: CPT

## 2019-01-13 PROCEDURE — 85027 COMPLETE CBC AUTOMATED: CPT | Performed by: INTERNAL MEDICINE

## 2019-01-13 PROCEDURE — 36415 COLL VENOUS BLD VENIPUNCTURE: CPT | Performed by: INTERNAL MEDICINE

## 2019-01-13 PROCEDURE — 87040 BLOOD CULTURE FOR BACTERIA: CPT | Performed by: INTERNAL MEDICINE

## 2019-01-13 PROCEDURE — 63710000001 PREDNISONE PER 1 MG: Performed by: INTERNAL MEDICINE

## 2019-01-13 PROCEDURE — 25010000002 FUROSEMIDE PER 20 MG: Performed by: THORACIC SURGERY (CARDIOTHORACIC VASCULAR SURGERY)

## 2019-01-13 PROCEDURE — 99024 POSTOP FOLLOW-UP VISIT: CPT | Performed by: THORACIC SURGERY (CARDIOTHORACIC VASCULAR SURGERY)

## 2019-01-13 PROCEDURE — 71045 X-RAY EXAM CHEST 1 VIEW: CPT

## 2019-01-13 PROCEDURE — 25010000002 ENOXAPARIN PER 10 MG: Performed by: THORACIC SURGERY (CARDIOTHORACIC VASCULAR SURGERY)

## 2019-01-13 RX ORDER — ACETAMINOPHEN 325 MG/1
650 TABLET ORAL EVERY 6 HOURS PRN
Status: DISCONTINUED | OUTPATIENT
Start: 2019-01-13 | End: 2019-01-17 | Stop reason: HOSPADM

## 2019-01-13 RX ORDER — FUROSEMIDE 10 MG/ML
40 INJECTION INTRAMUSCULAR; INTRAVENOUS ONCE
Status: COMPLETED | OUTPATIENT
Start: 2019-01-13 | End: 2019-01-13

## 2019-01-13 RX ADMIN — OXYCODONE HYDROCHLORIDE AND ACETAMINOPHEN 1 TABLET: 5; 325 TABLET ORAL at 10:33

## 2019-01-13 RX ADMIN — COLCHICINE 0.6 MG: 0.6 TABLET, FILM COATED ORAL at 10:24

## 2019-01-13 RX ADMIN — ACETYLCYSTEINE 1.5 ML: 200 INHALANT RESPIRATORY (INHALATION) at 06:54

## 2019-01-13 RX ADMIN — OXYCODONE HYDROCHLORIDE AND ACETAMINOPHEN 1 TABLET: 5; 325 TABLET ORAL at 20:20

## 2019-01-13 RX ADMIN — IPRATROPIUM BROMIDE AND ALBUTEROL SULFATE 3 ML: 2.5; .5 SOLUTION RESPIRATORY (INHALATION) at 06:53

## 2019-01-13 RX ADMIN — ENOXAPARIN SODIUM 30 MG: 30 INJECTION SUBCUTANEOUS at 20:21

## 2019-01-13 RX ADMIN — ENOXAPARIN SODIUM 30 MG: 30 INJECTION SUBCUTANEOUS at 10:23

## 2019-01-13 RX ADMIN — FUROSEMIDE 40 MG: 10 INJECTION, SOLUTION INTRAVENOUS at 06:49

## 2019-01-13 RX ADMIN — METOPROLOL TARTRATE 12.5 MG: 25 TABLET, FILM COATED ORAL at 20:20

## 2019-01-13 RX ADMIN — COLCHICINE 0.6 MG: 0.6 TABLET, FILM COATED ORAL at 20:20

## 2019-01-13 RX ADMIN — DOCUSATE SODIUM 100 MG: 100 CAPSULE ORAL at 20:20

## 2019-01-13 RX ADMIN — DOCUSATE SODIUM 100 MG: 100 CAPSULE ORAL at 10:24

## 2019-01-13 RX ADMIN — OXYCODONE HYDROCHLORIDE AND ACETAMINOPHEN 1 TABLET: 5; 325 TABLET ORAL at 05:52

## 2019-01-13 RX ADMIN — METOPROLOL TARTRATE 12.5 MG: 25 TABLET, FILM COATED ORAL at 10:24

## 2019-01-13 RX ADMIN — PREDNISONE 20 MG: 20 TABLET ORAL at 10:24

## 2019-01-13 NOTE — PLAN OF CARE
Problem: Patient Care Overview  Goal: Plan of Care Review  Outcome: Ongoing (interventions implemented as appropriate)   01/13/19 0515   Coping/Psychosocial   Plan of Care Reviewed With patient   Plan of Care Review   Progress no change   OTHER   Outcome Summary VSS, c/o incisional pain prn pain med given x1, CT to dry suction with moderate output this shift, ambulatated in [place] x1 this shift, Sinus 70-90 on telemetry.       Problem: Breathing Pattern Ineffective (Adult)  Goal: Identify Related Risk Factors and Signs and Symptoms  Outcome: Ongoing (interventions implemented as appropriate)    Goal: Effective Oxygenation/Ventilation  Outcome: Ongoing (interventions implemented as appropriate)    Goal: Anxiety/Fear Reduction  Outcome: Ongoing (interventions implemented as appropriate)

## 2019-01-13 NOTE — PROGRESS NOTES
[facility] Medicine Services  INPATIENT PROGRESS NOTE    Patient Name: Sheeba Downs  Date of Admission: 1/10/2019  Today's Date: 01/13/19  Length of Stay: 3  Primary Care Physician: Provider, No Known    Subjective   Chief Complaint: pericardial effusion  HPI   Pericardial tubes to remain per Dr. Abreu.  He also gave her a dose of IV Lasix this morning.    Echocardiogram repeated yesterday and reviewed.    No new complaints. She has been more active this morning. Less short of breath.     Review of Systems   All pertinent negatives and positives are as above. All other systems have been reviewed and are negative unless otherwise stated.     Objective    Temp:  [96.9 °F (36.1 °C)-98 °F (36.7 °C)] 98 °F (36.7 °C)  Heart Rate:  [69-95] 84  Resp:  [16-20] 18  BP: (130-149)/(79-89) 149/89  Physical Exam  Constitutional: She is oriented to person, place, and time. She appears well-developed and well-nourished.   Up and ambulatory.  No distress. No family present. Seen and discussed with her nurse, Montse.   Head: Normocephalic and atraumatic.   Eyes: Conjunctivae and EOM are normal. Pupils are equal, round, and reactive to light.   Neck: Neck supple. No JVD present.   Cardiovascular: Normal rate, regular rhythm, normal heart sounds and intact distal pulses. Exam reveals no gallop and no friction rub. No murmur heard. Pericardial drains in place.   Pulmonary/Chest: Effort normal and breath sounds normal. No respiratory distress. She has no wheezes. She has no rales. She exhibits no tenderness.   Abdominal: Soft. Bowel sounds are normal. She exhibits no distension. There is no tenderness. There is no rebound and no guarding.   Musculoskeletal: Normal range of motion. She exhibits no edema, tenderness or deformity.   Neurological: She is alert and oriented to person, place, and time. She displays normal reflexes. No cranial nerve deficit. She exhibits normal muscle tone.   Skin:  Skin is warm and dry. No rash noted.   Psychiatric: She has a normal mood and affect. Her behavior is normal. Judgment and thought content normal.     Results Review:  I have reviewed the labs, radiology results, and diagnostic studies.    Laboratory Data:   Results from last 7 days   Lab Units  01/13/19   0500  01/12/19   0346  01/11/19   1035   WBC 10*3/mm3  12.37*  17.48*  10.58   HEMOGLOBIN g/dL  7.5*  9.3*  10.0*   HEMATOCRIT %  22.9*  28.3*  30.3*   PLATELETS 10*3/mm3  486*  543*  417*     Results from last 7 days   Lab Units  01/13/19   0500  01/12/19   0346  01/11/19   1035   01/10/19   1921   SODIUM mmol/L  141  140  140   < >  140   POTASSIUM mmol/L  3.6  4.3  4.1   < >  3.2*   CHLORIDE mmol/L  105  102  103   < >  99   CO2 mmol/L  29.0  28.0  27.0   < >  29.0   BUN mg/dL  10  11  9   < >  7   CREATININE mg/dL  0.48*  0.55  0.45*   < >  0.68   CALCIUM mg/dL  7.5*  7.9*  7.9*   < >  7.7*   BILIRUBIN mg/dL  0.3   --    --    --   0.7   ALK PHOS U/L  59   --    --    --   83   ALT (SGPT) U/L  37   --    --    --   53   AST (SGOT) U/L  18   --    --    --   35   GLUCOSE mg/dL  85  124*  138*   < >  100    < > = values in this interval not displayed.     Culture Data:   Blood Culture   Date Value Ref Range Status   01/10/2019 Staphylococcus, coagulase negative (A)  Final   01/10/2019 No growth at 2 days  Preliminary     Wound Culture   Date Value Ref Range Status   01/11/2019 No growth at 24 hours  Preliminary     Radiology Data:   Imaging Results (last 24 hours)     Procedure Component Value Units Date/Time    XR Chest 1 View [[id number]] Collected:  01/13/19 0859     Updated:  01/13/19 0904    Narrative:       History:  29-year-old with tamponade     Reference:  Chest radiograph one day prior.     Findings:  Frontal chest radiograph performed.     Stable enlargement of the cardiac/pericardial silhouette. Left basilar  opacities are unchanged, possibly combined pleural fluid and  atelectasis. There is a small  right pleural effusion now seen. Mild  right basilar atelectasis.     Pericardial drains appear unchanged.          Impression:          1.. Unchanged left basilar opacities as above.  2. Suspected developing small right pleural effusion and right basilar  atelectasis.  This report was finalized on 2019 09:01 by Dr Curt Gil, .        Results for orders placed during the hospital encounter of 01/10/19   Adult Transthoracic Echo Complete W/ Cont if Necessary Per Protocol    Narrative · Left ventricular systolic function is normal. Estimated EF appears to be   in the range of 56 - 60%.  · Normal right ventricular cavity size and systolic function noted.  · Trace mitral valve regurgitation is present.  · Trace to mild tricuspid valve regurgitation is present. Estimated right   ventricular systolic pressure from tricuspid regurgitation is normal (<35   mmHg).  · There is a trivial pericardial effusion. There is no evidence of cardiac   tamponade.        I have reviewed the patient's current medications.     Assessment/Plan   Assessment:   Active Hospital Problems    Diagnosis   • Microcytic anemia   • Bacteremia     Likely contaminant      • Acute hypoxemic respiratory failure (CMS/HCC)   • Cardiac tamponade   • Pleural effusion   • Sinus tachycardia   • Pericarditis   • Rheumatoid arthritis (CMS/HCC)   • Sickle cell trait (CMS/HCC)   • Chest pain   • Acute pericarditis associated with rheumatoid arthritis (CMS/HCC)     Added automatically from request for surgery [id number]       Plan:   The patient originally presented on 1/10 as a transfer from [facility] in [place], [place].  Transfer was initially sought at [place] or [facility], but both facilities were at capacity.     The patient had a recent (18) delivery of a healthy baby girl by .  She developed problems with shortness of breath and chest discomfort thereafter.  She was found to have a moderate left  pleural effusion and a large pericardial effusion with evidence of tamponade physiology.  After stabilization here, she underwent a subxiphoid pericardial window with Dr. Abreu on 1/11.  Transthoracic echocardiogram repeated on 1/12 as above. Fluid studies are unrevealing thus far.    Patient has no intention to breast feed.      She also has a history of rheumatoid arthritis.  Dr. Bray has seen her.  She has been placed on a prednisone taper and is also receiving colchicine.  He thinks that her issues could in fact be related to rheumatoid arthritis.  She does take Plaquenil chronically.     She has had one blood culture come back positive for coagulase-negative Staphylococcus.  This is likely contaminant. Repeated this morning.    Check anemia substrates.     Continue Lovenox for DVT prophylaxis.     Discharge Planning: Indeterminate at present.     Bob Lorenzo DO   01/13/19   9:41 AM

## 2019-01-13 NOTE — PROGRESS NOTES
"Patient: Sheeba Downs  : 1990     Procedure: Procedure(s):  SUBXIPHOID PERICARDIAL WINDOW    Procedure Date: 1/10/2019 - 2019    POD: 2 Days Post-Op      Subjective   She is alert and oriented this morning.  No respiratory distress.  She is currently waiting on a pain pill as she is sore after getting up and going to the bathroom, being weighed, etc..  She has been in sinus rhythm on the monitor and hemodynamically stable.     Objective   Visit Vitals  /79 (BP Location: Left arm, Patient Position: Sitting)   Pulse 69   Temp 96.9 °F (36.1 °C) (Temporal)   Resp 18   Ht 162.6 cm (64\")   Wt 106 kg (233 lb 2 oz)   SpO2 96%   BMI 40.02 kg/m²       Intake/Output Summary (Last 24 hours) at 2019 0550  Last data filed at 2019 0530  Gross per 24 hour   Intake 600 ml   Output 38 ml   Net 562 ml     Pleur-evac at 300 mL -140 mL yesterday equal 160 mL per 24 hours.  The drainage is serous and there is no air leak seen                                   Lab Results:    CBC:   Results from last 7 days   Lab Units  19   0500  19   0346  19   1035   WBC 10*3/mm3  12.37*  17.48*  10.58   HEMATOCRIT %  22.9*  28.3*  30.3*   PLATELETS 10*3/mm3  486*  543*  417*     BMP:   Results from last 7 days   Lab Units  19   0500  19   0346  19   1035   SODIUM mmol/L  141  140  140   POTASSIUM mmol/L  3.6  4.3  4.1   CHLORIDE mmol/L  105  102  103   CO2 mmol/L  29.0  28.0  27.0   GLUCOSE mg/dL  85  124*  138*   BUN mg/dL  10  11  9   CREATININE mg/dL  0.48*  0.55  0.45*     Coag:   Results from last 7 days   Lab Units  01/10/19   1920   INR   1.40*   APTT seconds  38.8*       Images:  Imaging Results (last 24 hours)     Procedure Component Value Units Date/Time    XR Chest 1 View [[id number]] Updated:  19    XR Chest 1 View [[id number]] Collected:  19     Updated:  19    Narrative:       History:  29-year-old with tamponade     Reference:  Chest " radiograph one day prior.     Findings:  Frontal chest radiograph performed.     Enlarged cardiac/pericardial silhouette, grossly stable. Pericardial  drains are stable. Opacities at the lingula and left base may be  combined pleural fluid and atelectasis. Slight improved aeration of the  left upper lobe. Right lung is grossly clear. No large pleural effusion  or pneumothorax.          Impression:       Stable pericardial drains. No overt change from yesterday.  This report was finalized on 01/12/2019 08:07 by Dr Curt Gil, .        Images Today: The same as yesterday.  Pericardial tubes are in place.  There is a left effusion/atelectasis.      Physical Exam:   Physical Exam:  General: Alert and oriented.  No respiratory distress.  Chest: Slightly decreased breath sounds on the left side at the base.  Pericardial tube in place and wound okay  Heart: Regular rate and rhythm without murmur gallop or rub.      Impression: Hemodynamically stable.  Mild to moderate output of pericardial tubes.  Labs remarkable for decrease in hematocrit to 22.8%.  Weight is up 7 pounds from 01/10/2019.  She might benefit from some diuresis    Plan: Keep pericardial tubes at present.    Glenn Abreu MD  01/13/19  5:50 AM

## 2019-01-13 NOTE — OP NOTE
Preoperative diagnosis:  1. History of percarditis  2. Pericardial effusion  3. Cardiac tamponade  4. History of Immunosuppression    Postoperative diagnosis: Same      Procedure:   Urgent subxiphoid pericardial window    Surgeon: Marcelino Dougherty M.D.     Anesthesia: Gen. Endotracheal (Aden Parks M.D.)      Estimated blood loss: Minimal     Drains:   24 Congolese anterior and posterior mediastinal aman drains  Specimens:  See orders.    1. Pericardial fluid submitted for gram stain, aerobic and anaerobic culture, acid fast, and fungal cultures.  Additional nocardia and actinobacter   2. Pericardial biopsy  3. Pericardial fluid submitted for cytology  4. Pericardial rind submitted  For cultures  5. Peripericardial lymph node #1 and # 2        Operative findings: Fibrinous pericarditis.  There was gelatinous changes along the right atrium with fibrinous rind overlying the right atrium and diaphragmatic surface of the right ventricle.  Non purulent and blood fluid likely consistent with severe inflammation.  500 ml of pleural fluid evacuated with restitution of hemodynamics following induction of anesthesia.  I was able to access the pleural cavities from the subxiphoid access draining 600 ml of straw colored non purulent pleural fluid from the right pleural cavity and a small amount of pleural fluid of similar characteristics from the left pleural cavity.       Operative description in detail:  She was taken to the operative suite urgently where she was placed in a supine position,  Appropriate arterial and venous access was established without remark.  She was prepped and draped in the usual and sterile surgical fashion with all preparations made while she was awake. Perioperative antibiotics were administered. A time out was performed. With all in readiness, induction of general anesthesia and placement of a single-lumen endotracheal tube was performed.  Although her hemodynamics were reasonable initially  she did begin to have hemodynamic compromise requiring the administration of pharmacology to support prior to accessing the pericardial cavity.  She did maintain perfusion though.    I did proceed to perform an upper abdominal vertical incision sharply. This was deepened with electrocautery dividing the subcutaneous fat and the linea alba. The xiphoid process was excised in a piecemeal fashion. Associated pericardial fat was cleared. With that the pericardium was opened. A large gush of pericardial fluid (limited amount of fluid was placed onto a separate container which did not clot) was evacuated under pressure.  Her hemodynamics improved.  Pericardial fluid was submitted for cultures as per orders.  Pericardial fluid was submitted for cytology.  It did not appear frankly infected.     A portion of pericardium was excised and submitted for pathology. I proceeded to clear gelatinous fibrinous material from the region of the right atrium.  Similarly from the diaphragmatic surface of the right ventricle.  The pericardium was thickened and edematous.  No pericardial implants.  Total fluid evacuated was approximately 500 ml. I inspected the available to visual pericardial sac and cardiac surfaces.  The pleural spaces were accessed via the subxiphoid access.  Fluid drained as detailed by operative findings.  To that end, the pericardial sac was drained with 24 Fr aman drains placed posteriorly and right lateral adjacent to the right atrium.  The thoracoabdominal fascia was re-approximated with 0 looped PDS and then in layers anatomically with absorbable suture the incision was closed primarily.   Instruments, sharps, and sponge counts were reported as correct. Hemostasis was pristine.      Complications: None      Disposition: Transfer to critical care unit in stable but guarded condition.  Findings and condition were conveyed to her father.

## 2019-01-14 ENCOUNTER — APPOINTMENT (OUTPATIENT)
Dept: GENERAL RADIOLOGY | Facility: HOSPITAL | Age: 29
End: 2019-01-14

## 2019-01-14 LAB
ANION GAP SERPL CALCULATED.3IONS-SCNC: 9 MMOL/L (ref 4–13)
BUN BLD-MCNC: 8 MG/DL (ref 5–21)
BUN/CREAT SERPL: 15.7 (ref 7–25)
CALCIUM SPEC-SCNC: 8 MG/DL (ref 8.4–10.4)
CENTROMERE B AB SER-ACNC: <0.2 AI (ref 0–0.9)
CHLORIDE SERPL-SCNC: 101 MMOL/L (ref 98–110)
CHROMATIN AB SERPL-ACNC: 0.2 AI (ref 0–0.9)
CO2 SERPL-SCNC: 32 MMOL/L (ref 24–31)
CREAT BLD-MCNC: 0.51 MG/DL (ref 0.5–1.4)
DEPRECATED RDW RBC AUTO: 43.8 FL (ref 40–54)
DSDNA AB SER-ACNC: 4 IU/ML (ref 0–9)
ENA JO1 AB SER-ACNC: <0.2 AI (ref 0–0.9)
ENA RNP AB SER-ACNC: 0.4 AI (ref 0–0.9)
ENA SCL70 AB SER-ACNC: <0.2 AI (ref 0–0.9)
ENA SM AB SER-ACNC: <0.2 AI (ref 0–0.9)
ENA SS-A AB SER-ACNC: <0.2 AI (ref 0–0.9)
ENA SS-B AB SER-ACNC: <0.2 AI (ref 0–0.9)
ERYTHROCYTE [DISTWIDTH] IN BLOOD BY AUTOMATED COUNT: 14.6 % (ref 12–15)
GFR SERPL CREATININE-BSD FRML MDRD: >150 ML/MIN/1.73
GLUCOSE BLD-MCNC: 85 MG/DL (ref 70–100)
HCT VFR BLD AUTO: 28.9 % (ref 37–47)
HGB BLD-MCNC: 9.3 G/DL (ref 12–16)
Lab: NORMAL
MCH RBC QN AUTO: 26.6 PG (ref 28–32)
MCHC RBC AUTO-ENTMCNC: 32.2 G/DL (ref 33–36)
MCV RBC AUTO: 82.6 FL (ref 82–98)
PLATELET # BLD AUTO: 508 10*3/MM3 (ref 130–400)
PMV BLD AUTO: 9.8 FL (ref 6–12)
POTASSIUM BLD-SCNC: 3.7 MMOL/L (ref 3.5–5.3)
RBC # BLD AUTO: 3.5 10*6/MM3 (ref 4.2–5.4)
RIBOSOMAL P AB SER-ACNC: <0.2 AI (ref 0–0.9)
RIBOSOMAL P AB SER-ACNC: <0.2 AI (ref 0–0.9)
SODIUM BLD-SCNC: 142 MMOL/L (ref 135–145)
TOTAL IGE SMQN RAST: 222 IU/ML (ref 0–100)
WBC NRBC COR # BLD: 9.76 10*3/MM3 (ref 4.8–10.8)

## 2019-01-14 PROCEDURE — 25010000002 ONDANSETRON PER 1 MG: Performed by: THORACIC SURGERY (CARDIOTHORACIC VASCULAR SURGERY)

## 2019-01-14 PROCEDURE — 63710000001 PREDNISONE PER 1 MG: Performed by: INTERNAL MEDICINE

## 2019-01-14 PROCEDURE — 85027 COMPLETE CBC AUTOMATED: CPT | Performed by: INTERNAL MEDICINE

## 2019-01-14 PROCEDURE — 94760 N-INVAS EAR/PLS OXIMETRY 1: CPT

## 2019-01-14 PROCEDURE — 94799 UNLISTED PULMONARY SVC/PX: CPT

## 2019-01-14 PROCEDURE — 80048 BASIC METABOLIC PNL TOTAL CA: CPT | Performed by: INTERNAL MEDICINE

## 2019-01-14 PROCEDURE — 25010000002 ENOXAPARIN PER 10 MG: Performed by: THORACIC SURGERY (CARDIOTHORACIC VASCULAR SURGERY)

## 2019-01-14 PROCEDURE — 99024 POSTOP FOLLOW-UP VISIT: CPT | Performed by: NURSE PRACTITIONER

## 2019-01-14 PROCEDURE — 71045 X-RAY EXAM CHEST 1 VIEW: CPT

## 2019-01-14 RX ADMIN — METOPROLOL TARTRATE 12.5 MG: 25 TABLET, FILM COATED ORAL at 21:56

## 2019-01-14 RX ADMIN — METOPROLOL TARTRATE 12.5 MG: 25 TABLET, FILM COATED ORAL at 08:17

## 2019-01-14 RX ADMIN — PREDNISONE 20 MG: 20 TABLET ORAL at 08:17

## 2019-01-14 RX ADMIN — COLCHICINE 0.6 MG: 0.6 TABLET, FILM COATED ORAL at 21:55

## 2019-01-14 RX ADMIN — ENOXAPARIN SODIUM 30 MG: 30 INJECTION SUBCUTANEOUS at 08:18

## 2019-01-14 RX ADMIN — COLCHICINE 0.6 MG: 0.6 TABLET, FILM COATED ORAL at 08:17

## 2019-01-14 RX ADMIN — OXYCODONE HYDROCHLORIDE AND ACETAMINOPHEN 1 TABLET: 5; 325 TABLET ORAL at 02:55

## 2019-01-14 RX ADMIN — DOCUSATE SODIUM 100 MG: 100 CAPSULE ORAL at 08:18

## 2019-01-14 RX ADMIN — DOCUSATE SODIUM 100 MG: 100 CAPSULE ORAL at 21:55

## 2019-01-14 RX ADMIN — OXYCODONE HYDROCHLORIDE AND ACETAMINOPHEN 1 TABLET: 5; 325 TABLET ORAL at 23:03

## 2019-01-14 RX ADMIN — POLYETHYLENE GLYCOL (3350) 17 G: 17 POWDER, FOR SOLUTION ORAL at 08:17

## 2019-01-14 RX ADMIN — OXYCODONE HYDROCHLORIDE AND ACETAMINOPHEN 1 TABLET: 5; 325 TABLET ORAL at 08:17

## 2019-01-14 RX ADMIN — ENOXAPARIN SODIUM 30 MG: 30 INJECTION SUBCUTANEOUS at 21:56

## 2019-01-14 RX ADMIN — ONDANSETRON 4 MG: 2 INJECTION INTRAMUSCULAR; INTRAVENOUS at 08:58

## 2019-01-14 NOTE — PLAN OF CARE
Problem: Patient Care Overview  Goal: Plan of Care Review  Outcome: Ongoing (interventions implemented as appropriate)   01/14/19 3040   Coping/Psychosocial   Plan of Care Reviewed With patient   Plan of Care Review   Progress improving   OTHER   Outcome Summary Pt reports a fair appetite that is improving, will continue to monitor.

## 2019-01-14 NOTE — PROGRESS NOTES
"Urgent subxiphoid pericardial window    POD 3    Up in the bathroom and ambulating within room. Good pain control. On room air. IS 1250 per patient report. (+) BM. Weight down 8 pounds from yesterday.     Telemetry: NSR 75-90    Visit Vitals  /89 (BP Location: Left arm, Patient Position: Lying)   Pulse 77   Temp 97.4 °F (36.3 °C) (Temporal)   Resp 18   Ht 162.6 cm (64\")   Wt 102 kg (225 lb 9 oz)   SpO2 95%   BMI 38.72 kg/m²       Intake/Output Summary (Last 24 hours) at 1/14/2019 0820  Last data filed at 1/14/2019 0800  Gross per 24 hour   Intake 600 ml   Output 3415 ml   Net -2815 ml     MST output: 60 ml /24 hours documented per nursing; total 300 ml pleurevac          Chest x ray: pericardial tubes in place, left basilar atelectasis/effusion-improved, no pneumothorax    Physical Exam:  General: No apparent distress. In good spirits.  Cardiovascular: Regular rate and rhythm without murmur, rubs, or gallops.    Pulmonary: Clear to auscultation bilaterally without wheezing, rubs, or rales.  Chest: subxiphoid pericardial window incision clean, dry, and intact. Mediastinal tubes clean and dry.   Abdomen: Soft, non-distended, and non-tender.  Extremities: Warm, moves all extremities. No edema.   Neurologic: Grossly intact with no focal deficits.       Impression:  History of pericarditis  Pericardial effusion  Cardiac tamponade  History of immunosuppression    Plan:   Continue mediastinal tube drainage  Pulmonary toilet and ambulation  Routine post cardiac surgery regimen  Await Pathology  "

## 2019-01-14 NOTE — PROGRESS NOTES
[facility] Medicine Services  INPATIENT PROGRESS NOTE    Patient Name: Sheeba Downs  Date of Admission: 1/10/2019  Today's Date: 01/14/19  Length of Stay: 4  Primary Care Physician: Provider, No Known    Subjective   Chief Complaint: follow-up pericardial effusion  HPI   Patient is doing well this morning.  Now off supplemental oxygen.  Reports having a little bit of pain during the night, that she thinks is located the insertion site of the chest drain.  Denies any new shortness of breath.  No new chest pain or chest pressure.  Feels like that she is getting stronger, and is taking more walks in the hallway.  Father is at bedside this morning.  Voices no new complaints.    Review of Systems     All pertinent negatives and positives are as above. All other systems have been reviewed and are negative unless otherwise stated.     Objective    Temp:  [97 °F (36.1 °C)-98.3 °F (36.8 °C)] 98.3 °F (36.8 °C)  Heart Rate:  [66-82] 66  Resp:  [18] 18  BP: (133-160)/(79-92) 136/85  Physical Exam   Constitutional: She is oriented to person, place, and time. No distress.   Sitting up in bedside chair   HENT:   Head: Normocephalic.   Mouth/Throat: No oropharyngeal exudate.   Eyes: No scleral icterus.   Cardiovascular:   Mediastinal drain in place   Pulmonary/Chest: Effort normal. No respiratory distress.   Musculoskeletal: She exhibits no edema.   Neurological: She is alert and oriented to person, place, and time.   Skin: Skin is warm and dry. She is not diaphoretic.   Psychiatric: She has a normal mood and affect. Her behavior is normal.   Vitals reviewed.    Results Review:  I have reviewed the labs, radiology results, and diagnostic studies.    Laboratory Data:   Results from last 7 days   Lab Units  01/14/19   0304  01/13/19   0500  01/12/19   0346   WBC 10*3/mm3  9.76  12.37*  17.48*   HEMOGLOBIN g/dL  9.3*  7.5*  9.3*   HEMATOCRIT %  28.9*  22.9*  28.3*   PLATELETS 10*3/mm3  508*   486*  543*        Results from last 7 days   Lab Units  01/14/19   0304  01/13/19   0500  01/12/19   0346   01/10/19   1921   SODIUM mmol/L  142  141  140   < >  140   POTASSIUM mmol/L  3.7  3.6  4.3   < >  3.2*   CHLORIDE mmol/L  101  105  102   < >  99   CO2 mmol/L  32.0*  29.0  28.0   < >  29.0   BUN mg/dL  8  10  11   < >  7   CREATININE mg/dL  0.51  0.48*  0.55   < >  0.68   CALCIUM mg/dL  8.0*  7.5*  7.9*   < >  7.7*   BILIRUBIN mg/dL   --   0.3   --    --   0.7   ALK PHOS U/L   --   59   --    --   83   ALT (SGPT) U/L   --   37   --    --   53   AST (SGOT) U/L   --   18   --    --   35   GLUCOSE mg/dL  85  85  124*   < >  100    < > = values in this interval not displayed.       Culture Data:   Blood Culture   Date Value Ref Range Status   01/13/2019 No growth at 24 hours  Preliminary   01/13/2019 No growth at 24 hours  Preliminary   01/10/2019 Staphylococcus, coagulase negative (A)  Final   01/10/2019 No growth at 3 days  Preliminary     Wound Culture   Date Value Ref Range Status   01/11/2019 No growth at 3 days  Preliminary       Radiology Data:   Imaging Results (last 24 hours)     Procedure Component Value Units Date/Time    XR Chest 1 View [[id number]] Collected:  01/14/19 0711     Updated:  01/14/19 0716    Narrative:       EXAMINATION:   XR CHEST 1 VW-  1/14/2019 7:11 AM CST     HISTORY: Pericardial drain     Frontal upright radiograph of the chest 1/14/2019 4:36 AM CST     COMPARISON: January 13, 2019.     FINDINGS:   Right lung is clear. Atelectasis left lower lung is again noted. The  cardiac silhouettes enlarged.     Pericardial drains are noted     The osseous structures and surrounding soft tissues demonstrate no acute  abnormality.       Impression:       1. Atelectasis left lung base unchanged from January 13  2. Pericardial drains are noted.        This report was finalized on 01/14/2019 07:12 by Dr. Honorio Garay MD.          I have reviewed the patient's current medications.      Assessment/Plan     Active Hospital Problems    Diagnosis   • Microcytic anemia   • Bacteremia     Likely contaminant      • Acute hypoxemic respiratory failure (CMS/HCC)   • Cardiac tamponade   • Pleural effusion   • Sinus tachycardia   • Pericarditis   • Rheumatoid arthritis (CMS/HCC)   • Sickle cell trait (CMS/HCC)   • Chest pain   • Acute pericarditis associated with rheumatoid arthritis (CMS/HCC)     Added automatically from request for surgery [id number]       Plan:  1.  Appreciate CT Surg management of mediastinal drain  2.  Prednisone taper  3.  PO Colchicine  4.  Continue to follow-up pericardial fluid studies  5.  Blood cultures likely c/w contamination  6.  Continue to monitor patient off of Abx therapy  7.  Incentive spirometry  8.  Increase activity  9.  Now off of supplemental 02  10.  Echocardiogram results reviewed  11.  Dispo: anticipate home soon when drain removed; clinically patient much improved.        Galen Pandey MD   01/14/19   11:31 AM

## 2019-01-14 NOTE — PROGRESS NOTES
Rheumatology Progress Note     LOS: 4 days   Patient Care Team:  Provider, No Known as PCP - General    Chief Complaint:  Chest pain      Interval History: Feeling better. Breathing fine. No diarrhea on colchicine. Tolerating prednisone. Joints feel ok.       Review of Systems:     CONSTITUTIONAL: negative for chills, fevers, night sweats, weight loss  RESPIRATORY: negative for cough, dyspnea on exertion, hemoptysis, shortness of breath, wheezing  CARDIOVASCULAR: negative for chest pain, chest pressure/discomfort, lower extremity edema, palpitations  GASTROINTESTINAL: negative for abdominal pain, constipation, diarrhea, nausea, vomiting  NEUROLOGICAL: negative for dizziness, headaches, numbness/tingling in extremities  MUSCULOSKELETAL: negative for joint pain, swelling, redness  SKIN: negative for rashes, lesions    Allergies: Patient has no known allergies.    Medication Review:   Current Facility-Administered Medications   Medication Dose Route Frequency Provider Last Rate Last Dose   • acetaminophen (TYLENOL) tablet 650 mg  650 mg Oral Q6H PRN Bob Lorenzo DO       • bisacodyl (DULCOLAX) suppository 10 mg  10 mg Rectal Daily PRN Marcelino Dougherty MD       • colchicine tablet 0.6 mg  0.6 mg Oral Q12H Johann Bray MD   0.6 mg at 01/13/19 2020   • docusate sodium (COLACE) capsule 100 mg  100 mg Oral BID Marcelino Dougherty MD   100 mg at 01/13/19 2020   • enoxaparin (LOVENOX) syringe 30 mg  30 mg Subcutaneous Q12H Marcelino Dougherty MD   30 mg at 01/13/19 2021   • metoprolol tartrate (LOPRESSOR) tablet 12.5 mg  12.5 mg Oral Q12H Marcelino Dougherty MD   12.5 mg at 01/13/19 2020   • ondansetron (ZOFRAN) tablet 4 mg  4 mg Oral Q6H PRN Marcelino Dougherty MD        Or   • ondansetron ODT (ZOFRAN-ODT) disintegrating tablet 4 mg  4 mg Oral Q6H PRN Marcelino Dougherty MD        Or   • ondansetron (ZOFRAN) injection 4 mg  4 mg Intravenous Q6H PRN Marcelino Dougherty MD       • oxyCODONE-acetaminophen  "(PERCOCET) 5-325 MG per tablet 1 tablet  1 tablet Oral Q3H PRN Marcelino Dougherty MD   1 tablet at 01/14/19 0255   • polyethylene glycol 3350 powder (packet)  17 g Oral Daily Marcelino Dougherty MD   17 g at 01/12/19 0900   • predniSONE (DELTASONE) tablet 20 mg  20 mg Oral Daily Johann Bray MD   20 mg at 01/13/19 1024    Followed by   • [START ON 1/15/2019] predniSONE (DELTASONE) tablet 10 mg  10 mg Oral Daily Johann Bray MD        Followed by   • [START ON 1/18/2019] predniSONE (DELTASONE) tablet 5 mg  5 mg Oral Daily Johann Brya MD       • Sodium chloride 0.9 % infusion  75 mL/hr Intravenous Continuous Bob Lorenzo DO 75 mL/hr at 01/12/19 1425 75 mL/hr at 01/12/19 1425         Vital Signs  /89 (BP Location: Left arm, Patient Position: Lying)   Pulse 77   Temp 97.4 °F (36.3 °C) (Temporal)   Resp 18   Ht 162.6 cm (64\")   Wt 102 kg (225 lb 9 oz)   SpO2 95%   BMI 38.72 kg/m²     Physical Exam:    GENERAL:  Alert, cooperative, no distress  HEENT: no conjunctival injection, no oral ulcers, no cervical adenopathy  HEART: RR; no murmurs, gallops, or rubs  LUNGS: clear to auscultation bilaterally, no rales or wheezes  ABDOMEN: soft, nontender, nondistended, positive bowel sounds  EXTREMITIES: no edema, erythema  SKIN: no rashes or lesions  MSK: no synovitis, warmth joint effusion            Results Review:   Lab Results (last 24 hours)     Procedure Component Value Units Date/Time    Wound Culture - Wound, Pericardium [[id number]] Collected:  01/11/19 0832    Specimen:  Wound from Pericardium Updated:  01/14/19 0747     Wound Culture No growth at 3 days     Gram Stain Few (2+) WBCs seen      No organisms seen    Blood Culture With MAUDE - Blood, Hand, Right [[id number]] Collected:  01/13/19 0501    Specimen:  Blood from Hand, Right Updated:  01/14/19 0530     Blood Culture No growth at 24 hours    Blood Culture With MAUDE - Blood, Arm, Left [[id number]] " Collected:  01/13/19 0501    Specimen:  Blood from Arm, Left Updated:  01/14/19 0530     Blood Culture No growth at 24 hours    CBC (No Diff) [[id number]]  (Abnormal) Collected:  01/14/19 0304    Specimen:  Blood Updated:  01/14/19 0353     WBC 9.76 10*3/mm3      RBC 3.50 10*6/mm3      Hemoglobin 9.3 g/dL      Hematocrit 28.9 %      MCV 82.6 fL      MCH 26.6 pg      MCHC 32.2 g/dL      RDW 14.6 %      RDW-SD 43.8 fl      MPV 9.8 fL      Platelets 508 10*3/mm3     Basic Metabolic Panel [[id number]]  (Abnormal) Collected:  01/14/19 0304    Specimen:  Blood Updated:  01/14/19 0345     Glucose 85 mg/dL      BUN 8 mg/dL      Creatinine 0.51 mg/dL      Sodium 142 mmol/L      Potassium 3.7 mmol/L      Chloride 101 mmol/L      CO2 32.0 mmol/L      Calcium 8.0 mg/dL      eGFR  African Amer >150 mL/min/1.73      BUN/Creatinine Ratio 15.7     Anion Gap 9.0 mmol/L     Narrative:       GFR Normal >60  Chronic Kidney Disease <60  Kidney Failure <15    Cyclic Citrul Peptide Antibody, IgG / IgA [[id number]]  (Abnormal) Collected:  01/10/19 1920    Specimen:  Blood Updated:  01/13/19 2205     CCP Antibodies IgG/IgA >250 units      Comment:                           Negative               <20                            Weak positive      20 - 39                            Moderate positive  40 - 59                            Strong positive        >59       Narrative:       Performed at:  [address] [place] [address]  [id number]  : Noé Bravo MD, Phone:  [phone]    Blood Culture - Blood, Arm, Left [[id number]] Collected:  01/10/19 1920    Specimen:  Blood from Arm, Left Updated:  01/13/19 2015     Blood Culture No growth at 3 days    Anaerobic Culture - Tissue, Pericardium [[id number]] Collected:  01/11/19 0839    Specimen:  Tissue from Pericardium Updated:  01/13/19 1159     Culture No anaerobes isolated at 2 days    Anaerobic Culture - Wound, Pericardium [[id number]] Collected:  01/11/19  0832    Specimen:  Wound from Pericardium Updated:  01/13/19 1158     Culture No anaerobes isolated at 2 days    Anaerobic Culture - Body Fluid, Pericardium [[id number]] Collected:  01/11/19 0832    Specimen:  Body Fluid from Pericardium Updated:  01/13/19 1158     Culture No anaerobes isolated at 2 days    Folate [[id number]] Collected:  01/12/19 0346    Specimen:  Blood Updated:  01/13/19 1113     Folate 6.07 ng/mL     Vitamin B12 [[id number]]  (Normal) Collected:  01/12/19 0346    Specimen:  Blood Updated:  01/13/19 1113     Vitamin B-12 396 pg/mL     Ferritin [[id number]]  (Abnormal) Collected:  01/12/19 0346    Specimen:  Blood Updated:  01/13/19 1043     Ferritin 399.00 ng/mL     Body Fluid Culture - Body Fluid, Pericardium [[id number]] Collected:  01/11/19 0832    Specimen:  Body Fluid from Pericardium Updated:  01/13/19 1023     BF Culture No growth at 2 days     Gram Stain Many (4+) WBCs seen      No organisms seen    Tissue / Bone Culture - Tissue, Pericardium [[id number]] Collected:  01/11/19 0839    Specimen:  Tissue from Pericardium Updated:  01/13/19 1023     Tissue Culture No growth at 2 days     Gram Stain Moderate (3+) WBCs seen      No organisms seen    Iron Profile [[id number]]  (Abnormal) Collected:  01/12/19 0346    Specimen:  Blood Updated:  01/13/19 1014     Iron 30 mcg/dL      TIBC 271 mcg/dL      Iron Saturation 11 %         Imaging Results (last 72 hours)     Procedure Component Value Units Date/Time    XR Chest 1 View [[id number]] Collected:  01/14/19 0711     Updated:  01/14/19 0716    Narrative:       EXAMINATION:   XR CHEST 1 VW-  1/14/2019 7:11 AM CST     HISTORY: Pericardial drain     Frontal upright radiograph of the chest 1/14/2019 4:36 AM CST     COMPARISON: January 13, 2019.     FINDINGS:   Right lung is clear. Atelectasis left lower lung is again noted. The  cardiac silhouettes enlarged.     Pericardial drains are noted     The osseous structures and surrounding soft tissues demonstrate  no acute  abnormality.       Impression:       1. Atelectasis left lung base unchanged from January 13  2. Pericardial drains are noted.        This report was finalized on 01/14/2019 07:12 by Dr. Honorio Garay MD.    XR Chest 1 View [[id number]] Collected:  01/13/19 0859     Updated:  01/13/19 0904    Narrative:       History:  29-year-old with tamponade     Reference:  Chest radiograph one day prior.     Findings:  Frontal chest radiograph performed.     Stable enlargement of the cardiac/pericardial silhouette. Left basilar  opacities are unchanged, possibly combined pleural fluid and  atelectasis. There is a small right pleural effusion now seen. Mild  right basilar atelectasis.     Pericardial drains appear unchanged.          Impression:          1.. Unchanged left basilar opacities as above.  2. Suspected developing small right pleural effusion and right basilar  atelectasis.  This report was finalized on 01/13/2019 09:01 by Dr Curt Gil, .    XR Chest 1 View [[id number]] Collected:  01/12/19 0804     Updated:  01/12/19 0810    Narrative:       History:  29-year-old with tamponade     Reference:  Chest radiograph one day prior.     Findings:  Frontal chest radiograph performed.     Enlarged cardiac/pericardial silhouette, grossly stable. Pericardial  drains are stable. Opacities at the lingula and left base may be  combined pleural fluid and atelectasis. Slight improved aeration of the  left upper lobe. Right lung is grossly clear. No large pleural effusion  or pneumothorax.          Impression:       Stable pericardial drains. No overt change from yesterday.  This report was finalized on 01/12/2019 08:07 by Dr Curt Gil, .    XR Chest 1 View [[id number]] Collected:  01/11/19 1040     Updated:  01/11/19 1058    Narrative:       EXAMINATION:   XR CHEST 1 VW-  1/11/2019 10:40 AM CST     HISTORY: Paracardial drain     Frontal upright radiograph of the chest 1/11/2019 10:28 AM CST     COMPARISON: January 10, 2019.      FINDINGS:   The right lung is clear. A moderate left pleural effusion is present..  The cardiac silhouettes moderately enlarged.     PeriCardial drains are present..      The osseous structures and surrounding soft tissues demonstrate no acute  abnormality.       Impression:       1. 2 drains are noted. These are pericardial drains     Moderate left pleural effusion.        This report was finalized on 01/11/2019 10:55 by Dr. Honorio Garay MD.              Assessment/Plan     RA, seropositive, pleuropericardial effusion w tamponade, post partum, anemia  -As yet, serology confirms RA but await AMBERLY etc for any suggestion of overlap syndrome, as well as surg path  -Fluid studies pointing away from infection  -Connection of pleuro pericardial effusion to her autoimmune disease is uncertain but plausible, and seems to be responding to treatment  -OK w dc home when drains out and per discretion of primary team. Home on the pred taper as listed, and would suggest once daily colchicine for 2-3 weeks as a precaution. I will fu w her in clinic as well.       Johann Bray MD  01/14/19  8:12 AM

## 2019-01-14 NOTE — PLAN OF CARE
Problem: Patient Care Overview  Goal: Plan of Care Review  Outcome: Ongoing (interventions implemented as appropriate)   01/14/19 6094   Coping/Psychosocial   Plan of Care Reviewed With patient;father   Plan of Care Review   Progress improving   OTHER   Outcome Summary VSS, NSR, CT Drainage remains serous and scant, approx 25cc so far this shift, pain is well controlled with prn percocet, IS teaching remphasized and she is now getting close to 1000. Ambulate x 4 without difficulty       Problem: Fall Risk (Adult)  Goal: Absence of Fall  Outcome: Ongoing (interventions implemented as appropriate)      Problem: Chest Tube Drainage Device (Adult)  Goal: Signs and Symptoms of Listed Potential Problems Will be Absent, Minimized or Managed (Chest Tube Drainage Device)  Outcome: Ongoing (interventions implemented as appropriate)

## 2019-01-14 NOTE — PLAN OF CARE
Problem: Patient Care Overview  Goal: Plan of Care Review  Outcome: Ongoing (interventions implemented as appropriate)    Goal: Individualization and Mutuality  Outcome: Ongoing (interventions implemented as appropriate)    Goal: Discharge Needs Assessment  Outcome: Ongoing (interventions implemented as appropriate)    Goal: Interprofessional Rounds/Family Conf  Outcome: Ongoing (interventions implemented as appropriate)      Problem: Fall Risk (Adult)  Goal: Identify Related Risk Factors and Signs and Symptoms  Outcome: Ongoing (interventions implemented as appropriate)    Goal: Absence of Fall  Outcome: Ongoing (interventions implemented as appropriate)      Problem: Chest Tube Drainage Device (Adult)  Goal: Signs and Symptoms of Listed Potential Problems Will be Absent, Minimized or Managed (Chest Tube Drainage Device)  Outcome: Ongoing (interventions implemented as appropriate)

## 2019-01-15 ENCOUNTER — APPOINTMENT (OUTPATIENT)
Dept: GENERAL RADIOLOGY | Facility: HOSPITAL | Age: 29
End: 2019-01-15

## 2019-01-15 LAB
ANA HOMOGEN TITR SER: NORMAL {TITER}
ANA SER QL IA: POSITIVE
BACTERIA SPEC AEROBE CULT: NORMAL
C-ANCA TITR SER IF: NORMAL TITER
CYTO UR: NORMAL
CYTO UR: NORMAL
LAB AP CASE REPORT: NORMAL
LAB AP CASE REPORT: NORMAL
LAB AP CLINICAL INFORMATION: NORMAL
Lab: NORMAL
MYELOPEROXIDASE AB SER-ACNC: <9 U/ML (ref 0–9)
P-ANCA ATYPICAL TITR SER IF: NORMAL TITER
P-ANCA TITR SER IF: NORMAL TITER
PATH REPORT.FINAL DX SPEC: NORMAL
PATH REPORT.FINAL DX SPEC: NORMAL
PATH REPORT.GROSS SPEC: NORMAL
PATH REPORT.GROSS SPEC: NORMAL
PROTEINASE3 AB SER IA-ACNC: <3.5 U/ML (ref 0–3.5)

## 2019-01-15 PROCEDURE — 94760 N-INVAS EAR/PLS OXIMETRY 1: CPT

## 2019-01-15 PROCEDURE — 25010000002 ENOXAPARIN PER 10 MG: Performed by: THORACIC SURGERY (CARDIOTHORACIC VASCULAR SURGERY)

## 2019-01-15 PROCEDURE — 99024 POSTOP FOLLOW-UP VISIT: CPT | Performed by: NURSE PRACTITIONER

## 2019-01-15 PROCEDURE — 63710000001 PREDNISONE PER 5 MG: Performed by: INTERNAL MEDICINE

## 2019-01-15 PROCEDURE — 25010000002 FUROSEMIDE PER 20 MG: Performed by: NURSE PRACTITIONER

## 2019-01-15 PROCEDURE — 94799 UNLISTED PULMONARY SVC/PX: CPT

## 2019-01-15 PROCEDURE — 71045 X-RAY EXAM CHEST 1 VIEW: CPT

## 2019-01-15 RX ORDER — POTASSIUM CHLORIDE 750 MG/1
20 CAPSULE, EXTENDED RELEASE ORAL 2 TIMES DAILY WITH MEALS
Status: DISCONTINUED | OUTPATIENT
Start: 2019-01-15 | End: 2019-01-17 | Stop reason: HOSPADM

## 2019-01-15 RX ORDER — FUROSEMIDE 10 MG/ML
20 INJECTION INTRAMUSCULAR; INTRAVENOUS
Status: DISCONTINUED | OUTPATIENT
Start: 2019-01-15 | End: 2019-01-17 | Stop reason: HOSPADM

## 2019-01-15 RX ADMIN — DOCUSATE SODIUM 100 MG: 100 CAPSULE ORAL at 09:38

## 2019-01-15 RX ADMIN — COLCHICINE 0.6 MG: 0.6 TABLET, FILM COATED ORAL at 20:38

## 2019-01-15 RX ADMIN — ENOXAPARIN SODIUM 30 MG: 30 INJECTION SUBCUTANEOUS at 20:38

## 2019-01-15 RX ADMIN — POTASSIUM CHLORIDE 20 MEQ: 750 CAPSULE, EXTENDED RELEASE ORAL at 12:46

## 2019-01-15 RX ADMIN — METOPROLOL TARTRATE 12.5 MG: 25 TABLET, FILM COATED ORAL at 20:38

## 2019-01-15 RX ADMIN — ENOXAPARIN SODIUM 30 MG: 30 INJECTION SUBCUTANEOUS at 09:37

## 2019-01-15 RX ADMIN — COLCHICINE 0.6 MG: 0.6 TABLET, FILM COATED ORAL at 09:38

## 2019-01-15 RX ADMIN — METOPROLOL TARTRATE 12.5 MG: 25 TABLET, FILM COATED ORAL at 09:38

## 2019-01-15 RX ADMIN — PREDNISONE 10 MG: 10 TABLET ORAL at 09:38

## 2019-01-15 RX ADMIN — OXYCODONE HYDROCHLORIDE AND ACETAMINOPHEN 1 TABLET: 5; 325 TABLET ORAL at 20:38

## 2019-01-15 RX ADMIN — FUROSEMIDE 20 MG: 10 INJECTION, SOLUTION INTRAVENOUS at 17:55

## 2019-01-15 RX ADMIN — FUROSEMIDE 20 MG: 10 INJECTION, SOLUTION INTRAVENOUS at 12:46

## 2019-01-15 NOTE — PROGRESS NOTES
"Urgent subxiphoid pericardial window    POD 4    Resting in bed watching tv. No real complaints other than wanting tubes removed and discharge home. Using IS, 500 ml. On room air. Reports back pain today.     Visit Vitals  /84 (BP Location: Left arm, Patient Position: Lying)   Pulse 75   Temp 98.9 °F (37.2 °C) (Oral)   Resp 18   Ht 162.6 cm (64\")   Wt 103 kg (226 lb 4 oz)   SpO2 93%   BMI 38.84 kg/m²       Intake/Output Summary (Last 24 hours) at 1/15/2019 1122  Last data filed at 1/15/2019 1041  Gross per 24 hour   Intake 600 ml   Output 1790 ml   Net -1190 ml     MST output: 100 ml/24 hours, total 400 ml in pleurevac    Path pending    Chest x ray: pericardial tubes in place, left basilar atelectasis/effusion-improved, no pneumothorax    Physical Exam:  General: No apparent distress. In good spirits.  Cardiovascular: Regular rate and rhythm without murmur, rubs, or gallops.    Pulmonary: Clear to auscultation bilaterally without wheezing, rubs, or rales.  Chest: Subxiphoid pericardial window incision clean, dry, and intact. Mediastinal tubes clean and dry.   Abdomen: Soft, non-distended, and non-tender.  Extremities: Warm, moves all extremities. No edema.   Neurologic: Grossly intact with no focal deficits.       Impression:  History of pericarditis  Pericardial effusion  Cardiac tamponade  History of immunosuppression  suboptimal pulmonary toilet    Plan:   Continue mediastinal tube drainage  diurese  Pulmonary toilet and ambulation  Routine post cardiac surgery regimen  Await Pathology  "

## 2019-01-15 NOTE — PLAN OF CARE
Problem: Patient Care Overview  Goal: Plan of Care Review  Outcome: Ongoing (interventions implemented as appropriate)   01/14/19 1454 01/14/19 2025 01/15/19 0401   Coping/Psychosocial   Plan of Care Reviewed With --  patient --    Plan of Care Review   Progress improving --  --    OTHER   Outcome Summary --  --  VSS. Pt c/o pain and given prn pain medication x1. Up with standby assist. Pt hopes to have tubes out today. Will continue to monitor and notify MD of changes.     Goal: Individualization and Mutuality  Outcome: Ongoing (interventions implemented as appropriate)      Problem: Fall Risk (Adult)  Goal: Absence of Fall  Outcome: Ongoing (interventions implemented as appropriate)    Goal: Identify Related Risk Factors and Signs and Symptoms  Outcome: Ongoing (interventions implemented as appropriate)      Problem: Chest Tube Drainage Device (Adult)  Goal: Signs and Symptoms of Listed Potential Problems Will be Absent, Minimized or Managed (Chest Tube Drainage Device)  Outcome: Ongoing (interventions implemented as appropriate)

## 2019-01-15 NOTE — PROGRESS NOTES
Discharge Planning Assessment  [facility]     Patient Name: Sheeba Downs  MRN: [id number]  Today's Date: 1/15/2019    Admit Date: 1/10/2019    Discharge Needs Assessment     Row Name 01/15/19 1027       Living Environment    Lives With  spouse    Name(s) of Who Lives With Patient  WE[place]    Current Living Arrangements  home/apartment/condo    Primary Care Provided by  self    Provides Primary Care For  child(flaco)    Caregiving Concerns  HAS NEW BABY    Family Caregiver if Needed  spouse    Quality of Family Relationships  helpful;involved    Able to Return to Prior Arrangements  yes    Living Arrangement Comments  PLAN IS FOR HOME AT DC       Resource/Environmental Concerns    Resource/Environmental Concerns  none    Transportation Concerns  car, none       Transition Planning    Patient/Family Anticipates Transition to  home with family    Patient/Family Anticipated Services at Transition  none    Transportation Anticipated  family or friend will provide       Discharge Needs Assessment    Readmission Within the Last 30 Days  no previous admission in last 30 days    Concerns to be Addressed  denies needs/concerns at this time;no discharge needs identified    Equipment Currently Used at Home  none    Anticipated Changes Related to Illness  none    Equipment Needed After Discharge  none    Discharge Coordination/Progress  PT LIVES AT HOME WITH SPOUSE AND PLANS HOME AT DC. PT INDEPENDENT PRIOR TO ADMIT. PT RECENTLY HAD A BABY OVER CHRISTMAS. PT DOES NOT USE ANY DME OR HOME SERVICES. PT HAS RX COVERAGE. DC NEEDS DENIED.         Discharge Plan     Row Name 01/15/19 1029       Plan    Plan  HOME WITH SPOUSE WITH NO DC NEEDS    Plan Comments  HOME WHEN TUBES DC'D    Final Discharge Disposition Code  01 - home or self-care        Destination      No service coordination in this encounter.      Durable Medical Equipment      No service coordination in this encounter.      Dialysis/Infusion      No service  coordination in this encounter.      Home Medical Care      No service coordination in this encounter.      Community Resources      No service coordination in this encounter.          Demographic Summary    No documentation.       Functional Status    No documentation.       Psychosocial    No documentation.       Abuse/Neglect    No documentation.       Legal    No documentation.       Substance Abuse    No documentation.       Patient Forms    No documentation.           BROOK Beltre

## 2019-01-15 NOTE — PROGRESS NOTES
[facility] Medicine Services  INPATIENT PROGRESS NOTE    Patient Name: Sheeba Downs  Date of Admission: 1/10/2019  Today's Date: 01/15/19  Length of Stay: 5  Primary Care Physician: Provider, No Known    Subjective   Chief Complaint: follow-up pericardial effusion  HPI   Patient doing well.  REALLY wants chest tube removed.  No new pain.  No new dyspnea.  Trying to work more with her incentive spirometer.  Voices no new complaints today.    Review of Systems     All pertinent negatives and positives are as above. All other systems have been reviewed and are negative unless otherwise stated.     Objective    Temp:  [97.3 °F (36.3 °C)-98.9 °F (37.2 °C)] 98.9 °F (37.2 °C)  Heart Rate:  [66-79] 75  Resp:  [18-20] 18  BP: (132-147)/(76-92) 132/84  Physical Exam   Constitutional: She is oriented to person, place, and time. No distress.   Resting comfortably in bed   HENT:   Head: Normocephalic.   Mouth/Throat: No oropharyngeal exudate.   Eyes: No scleral icterus.   Cardiovascular:   Mediastinal drain in place   Pulmonary/Chest: Effort normal. No respiratory distress.   Still with some diminished BSs at bases   Musculoskeletal: She exhibits no edema.   Neurological: She is alert and oriented to person, place, and time.   Skin: Skin is warm and dry. She is not diaphoretic.   Psychiatric: She has a normal mood and affect. Her behavior is normal.   Vitals reviewed.    Results Review:  I have reviewed the labs, radiology results, and diagnostic studies.    Laboratory Data:   Results from last 7 days   Lab Units  01/14/19   0304  01/13/19   0500  01/12/19   0346   WBC 10*3/mm3  9.76  12.37*  17.48*   HEMOGLOBIN g/dL  9.3*  7.5*  9.3*   HEMATOCRIT %  28.9*  22.9*  28.3*   PLATELETS 10*3/mm3  508*  486*  543*        Results from last 7 days   Lab Units  01/14/19   0304  01/13/19   0500  01/12/19   0346   01/10/19   1921   SODIUM mmol/L  142  141  140   < >  140   POTASSIUM mmol/L  3.7   3.6  4.3   < >  3.2*   CHLORIDE mmol/L  101  105  102   < >  99   CO2 mmol/L  32.0*  29.0  28.0   < >  29.0   BUN mg/dL  8  10  11   < >  7   CREATININE mg/dL  0.51  0.48*  0.55   < >  0.68   CALCIUM mg/dL  8.0*  7.5*  7.9*   < >  7.7*   BILIRUBIN mg/dL   --   0.3   --    --   0.7   ALK PHOS U/L   --   59   --    --   83   ALT (SGPT) U/L   --   37   --    --   53   AST (SGOT) U/L   --   18   --    --   35   GLUCOSE mg/dL  85  85  124*   < >  100    < > = values in this interval not displayed.       Culture Data:   Blood Culture   Date Value Ref Range Status   01/13/2019 No growth at 24 hours  Preliminary   01/13/2019 No growth at 24 hours  Preliminary   01/10/2019 Staphylococcus, coagulase negative (A)  Final   01/10/2019 No growth at 3 days  Preliminary     Wound Culture   Date Value Ref Range Status   01/11/2019 No growth at 3 days  Preliminary       Radiology Data:   Imaging Results (last 24 hours)     Procedure Component Value Units Date/Time    XR Chest 1 View [[id number]] Collected:  01/15/19 0659     Updated:  01/15/19 0703    Narrative:       EXAMINATION:   XR CHEST 1 VW-  1/15/2019 6:59 AM CST     HISTORY: Cardiac tamponade benign     Frontal upright radiograph of the chest 1/15/2019 4:10 AM CST     COMPARISON: January 14, 2018.     FINDINGS:   Right lung is clear. Atelectasis left lung is again noted. Pericardial  drains are present stable and unchanged.. Cardiac silhouettes mildly  enlarged this is unchanged.      The osseous structures and surrounding soft tissues demonstrate no acute  abnormality.       Impression:       1. Stable chest with pericardial drains.  2. Atelectasis left lung.        This report was finalized on 01/15/2019 07:00 by Dr. Honorio Garay MD.          I have reviewed the patient's current medications.     Assessment/Plan     Active Hospital Problems    Diagnosis   • Microcytic anemia   • Bacteremia     Likely contaminant      • Acute hypoxemic respiratory failure (CMS/HCC)   •  Cardiac tamponade   • Pleural effusion   • Sinus tachycardia   • Pericarditis   • Rheumatoid arthritis (CMS/HCC)   • Sickle cell trait (CMS/HCC)   • Chest pain   • Acute pericarditis associated with rheumatoid arthritis (CMS/HCC)     Added automatically from request for surgery [id number]       Plan:  1.  Appreciate CT Surg management of mediastinal drain; hopefully they can be removed today  2.  Prednisone taper  3.  PO Colchicine  4.  Continue to follow-up pericardial fluid studies (surg path report and cytology pending; cultures currently with no growth)  5.  Blood cultures likely c/w contamination  6.  Continue to monitor patient off of Abx therapy  7.  Incentive spirometry  8.  Increase activity  9.  Now off of supplemental 02  10.  Echocardiogram results reviewed  11.  D/C telemetry  12.  Dispo: anticipate home soon pending the above with close outpt follow-up, to include follow-up with Dr. Bray with [facility].    Galen Pandey MD   01/15/19   10:48 AM

## 2019-01-15 NOTE — PLAN OF CARE
Problem: Patient Care Overview  Goal: Plan of Care Review  Outcome: Ongoing (interventions implemented as appropriate)   01/15/19 0805   Coping/Psychosocial   Plan of Care Reviewed With patient   Plan of Care Review   Progress improving   OTHER   Outcome Summary vss tel d/c pt wants ct d/c decreased output enc tcdb et inc spir up in chair et amb      Goal: Individualization and Mutuality  Outcome: Ongoing (interventions implemented as appropriate)    Goal: Discharge Needs Assessment  Outcome: Ongoing (interventions implemented as appropriate)    Goal: Interprofessional Rounds/Family Conf  Outcome: Ongoing (interventions implemented as appropriate)      Problem: Fall Risk (Adult)  Goal: Absence of Fall  Outcome: Ongoing (interventions implemented as appropriate)      Problem: Chest Tube Drainage Device (Adult)  Goal: Signs and Symptoms of Listed Potential Problems Will be Absent, Minimized or Managed (Chest Tube Drainage Device)  Outcome: Ongoing (interventions implemented as appropriate)      Problem: Breathing Pattern Ineffective (Adult)  Goal: Effective Oxygenation/Ventilation  Outcome: Ongoing (interventions implemented as appropriate)

## 2019-01-16 ENCOUNTER — APPOINTMENT (OUTPATIENT)
Dept: GENERAL RADIOLOGY | Facility: HOSPITAL | Age: 29
End: 2019-01-16

## 2019-01-16 LAB
QUANTIFERON CRITERIA: ABNORMAL
QUANTIFERON MITOGEN VALUE: 0.04 IU/ML
QUANTIFERON NIL VALUE: 0.03 IU/ML
QUANTIFERON TB1 AG VALUE: 0.03 IU/ML
QUANTIFERON TB2 AG VALUE: 0.03 IU/ML
QUANTIFERON-TB GOLD PLUS: ABNORMAL

## 2019-01-16 PROCEDURE — 71045 X-RAY EXAM CHEST 1 VIEW: CPT

## 2019-01-16 PROCEDURE — 63710000001 PREDNISONE PER 5 MG: Performed by: INTERNAL MEDICINE

## 2019-01-16 PROCEDURE — 25010000002 IRON SUCROSE PER 1 MG: Performed by: INTERNAL MEDICINE

## 2019-01-16 PROCEDURE — 25010000002 ENOXAPARIN PER 10 MG: Performed by: THORACIC SURGERY (CARDIOTHORACIC VASCULAR SURGERY)

## 2019-01-16 PROCEDURE — 25010000002 FUROSEMIDE PER 20 MG: Performed by: NURSE PRACTITIONER

## 2019-01-16 PROCEDURE — 99024 POSTOP FOLLOW-UP VISIT: CPT | Performed by: NURSE PRACTITIONER

## 2019-01-16 RX ADMIN — ACETAMINOPHEN 650 MG: 325 TABLET, FILM COATED ORAL at 15:41

## 2019-01-16 RX ADMIN — COLCHICINE 0.6 MG: 0.6 TABLET, FILM COATED ORAL at 19:36

## 2019-01-16 RX ADMIN — ENOXAPARIN SODIUM 30 MG: 30 INJECTION SUBCUTANEOUS at 09:20

## 2019-01-16 RX ADMIN — FUROSEMIDE 20 MG: 10 INJECTION, SOLUTION INTRAVENOUS at 09:20

## 2019-01-16 RX ADMIN — PREDNISONE 10 MG: 10 TABLET ORAL at 09:20

## 2019-01-16 RX ADMIN — OXYCODONE HYDROCHLORIDE AND ACETAMINOPHEN 1 TABLET: 5; 325 TABLET ORAL at 20:23

## 2019-01-16 RX ADMIN — ENOXAPARIN SODIUM 30 MG: 30 INJECTION SUBCUTANEOUS at 19:36

## 2019-01-16 RX ADMIN — POTASSIUM CHLORIDE 20 MEQ: 750 CAPSULE, EXTENDED RELEASE ORAL at 09:20

## 2019-01-16 RX ADMIN — IRON SUCROSE 200 MG: 20 INJECTION, SOLUTION INTRAVENOUS at 12:14

## 2019-01-16 RX ADMIN — METOPROLOL TARTRATE 12.5 MG: 25 TABLET, FILM COATED ORAL at 20:22

## 2019-01-16 RX ADMIN — COLCHICINE 0.6 MG: 0.6 TABLET, FILM COATED ORAL at 09:20

## 2019-01-16 RX ADMIN — ACETAMINOPHEN 650 MG: 325 TABLET, FILM COATED ORAL at 09:20

## 2019-01-16 RX ADMIN — FUROSEMIDE 20 MG: 10 INJECTION, SOLUTION INTRAVENOUS at 16:39

## 2019-01-16 RX ADMIN — POTASSIUM CHLORIDE 20 MEQ: 750 CAPSULE, EXTENDED RELEASE ORAL at 17:25

## 2019-01-16 RX ADMIN — METOPROLOL TARTRATE 12.5 MG: 25 TABLET, FILM COATED ORAL at 09:20

## 2019-01-16 NOTE — PLAN OF CARE
Problem: Patient Care Overview  Goal: Plan of Care Review  Outcome: Ongoing (interventions implemented as appropriate)   01/16/19 2084   Coping/Psychosocial   Plan of Care Reviewed With patient   Plan of Care Review   Progress improving   OTHER   Outcome Summary VSS, c/o incisional pain-prn meds with moderate relief, encourage IS and ambulation, IS up to 1000 at times, OOB and ambulating without difficulty, no new issues, scant drainage to MST, pt hoping to have removed today and home tomorrow, await dr moreno for evaluation, no new issues noted at this time/cont to monitor and notify MD of any changes     Goal: Individualization and Mutuality  Outcome: Ongoing (interventions implemented as appropriate)    Goal: Discharge Needs Assessment  Outcome: Ongoing (interventions implemented as appropriate)    Goal: Interprofessional Rounds/Family Conf  Outcome: Ongoing (interventions implemented as appropriate)      Problem: Fall Risk (Adult)  Goal: Absence of Fall  Outcome: Outcome(s) achieved Date Met: 01/16/19    Goal: Identify Related Risk Factors and Signs and Symptoms  Outcome: Outcome(s) achieved Date Met: 01/16/19    Goal: Absence of Fall  Outcome: Outcome(s) achieved Date Met: 01/16/19      Problem: Chest Tube Drainage Device (Adult)  Goal: Signs and Symptoms of Listed Potential Problems Will be Absent, Minimized or Managed (Chest Tube Drainage Device)  Outcome: Ongoing (interventions implemented as appropriate)      Problem: Breathing Pattern Ineffective (Adult)  Goal: Effective Oxygenation/Ventilation  Outcome: Ongoing (interventions implemented as appropriate)

## 2019-01-16 NOTE — PROGRESS NOTES
Rheumatology Progress Note     LOS: 5 days   Patient Care Team:  Provider, No Known as PCP - General    Chief Complaint:  Chest pain      Interval History: She is doing okay.  Apparently too much drainage from the chest tube to pull it today.  She denies joint pain.  She denies dyspnea.  She denies chest pain.      Review of Systems:     CONSTITUTIONAL: negative for chills, fevers, night sweats, weight loss  RESPIRATORY: negative for cough, dyspnea on exertion, hemoptysis, shortness of breath, wheezing  CARDIOVASCULAR: negative for chest pain, chest pressure/discomfort, lower extremity edema, palpitations  GASTROINTESTINAL: negative for abdominal pain, constipation, diarrhea, nausea, vomiting  NEUROLOGICAL: negative for dizziness, headaches, numbness/tingling in extremities  MUSCULOSKELETAL: negative for joint pain, swelling, redness  SKIN: negative for rashes, lesions    Allergies: Patient has no known allergies.    Medication Review:   Current Facility-Administered Medications   Medication Dose Route Frequency Provider Last Rate Last Dose   • acetaminophen (TYLENOL) tablet 650 mg  650 mg Oral Q6H PRN Bob Lorenzo,        • bisacodyl (DULCOLAX) suppository 10 mg  10 mg Rectal Daily PRN Marcelino Dougherty MD       • colchicine tablet 0.6 mg  0.6 mg Oral Q12H Johann Bray MD   0.6 mg at 01/15/19 0938   • docusate sodium (COLACE) capsule 100 mg  100 mg Oral BID Marcelino Dougherty MD   100 mg at 01/15/19 0938   • enoxaparin (LOVENOX) syringe 30 mg  30 mg Subcutaneous Q12H Marcelino Dougherty MD   30 mg at 01/15/19 0937   • furosemide (LASIX) injection 20 mg  20 mg Intravenous BID AC Denise Rico APRN   20 mg at 01/15/19 1755   • metoprolol tartrate (LOPRESSOR) tablet 12.5 mg  12.5 mg Oral Q12H Marcelino Dougherty MD   12.5 mg at 01/15/19 0938   • ondansetron (ZOFRAN) tablet 4 mg  4 mg Oral Q6H PRN Marcelino Dougherty MD        Or   • ondansetron ODT (ZOFRAN-ODT) disintegrating tablet 4 mg  4  "mg Oral Q6H PRN Marcelino Dougherty MD        Or   • ondansetron (ZOFRAN) injection 4 mg  4 mg Intravenous Q6H PRN Marcelino Dougherty MD   4 mg at 01/14/19 0858   • oxyCODONE-acetaminophen (PERCOCET) 5-325 MG per tablet 1 tablet  1 tablet Oral Q3H PRN Marcelino Dougherty MD   1 tablet at 01/14/19 2303   • polyethylene glycol 3350 powder (packet)  17 g Oral Daily Marcelino Dougherty MD   17 g at 01/14/19 0817   • potassium chloride (MICRO-K) CR capsule 20 mEq  20 mEq Oral BID With Meals Denise Rico APRN   20 mEq at 01/15/19 1246   • predniSONE (DELTASONE) tablet 10 mg  10 mg Oral Daily Johann Bray MD   10 mg at 01/15/19 0938    Followed by   • [START ON 1/18/2019] predniSONE (DELTASONE) tablet 5 mg  5 mg Oral Daily Johann Bray MD             Vital Signs  /88 (BP Location: Right arm, Patient Position: Lying)   Pulse 83   Temp 97.9 °F (36.6 °C) (Oral)   Resp 16   Ht 162.6 cm (64\")   Wt 103 kg (226 lb 4 oz)   SpO2 95%   BMI 38.84 kg/m²     Physical Exam:    GENERAL:  Alert, cooperative, no distress  HEENT: no conjunctival injection, no oral ulcers, no cervical adenopathy  HEART: RR; no murmurs, gallops, or rubs  LUNGS: clear to auscultation bilaterally, no rales or wheezes  ABDOMEN: soft, nontender, nondistended, positive bowel sounds  EXTREMITIES: no edema, erythema  SKIN: no rashes or lesions  MSK: no synovitis, warmth joint effusion            Results Review:   Lab Results (last 24 hours)     Procedure Component Value Units Date/Time    Tissue Pathology Exam [[id number]] Collected:  01/11/19 0843    Specimen:  Tissue from Pericardium, Tissue from Pericardium, Tissue from Pericardium Updated:  01/15/19 1358     Case Report --     Surgical Pathology Report                         Case: [id number]                                  Authorizing Provider:  Marcelino Dougherty MD      Collected:           01/11/2019 08:43 AM          Ordering Location:     [facility]" "[place]  Received:            01/11/2019 09:03 AM          Pathologist:           Audelia Mendez MD                                                        Specimens:   1) - Pericardium, PERIPERICARDIAL LYMPH NODE #1                                                     2) - Pericardium, PERICARDIAL BIOPSY                                                                3) - Pericardium, PERIPERICARDIAL LYMPH NODE #2                                             Final Diagnosis --     1.Peripericardial lymph nodes, excisional biopsy:  A.  Lymph nodes (2) demonstrating changes of acute lymphadenitis, mild, and reactive lymphoid hyperplasia.  B.  No abscesses or granulomata identified.  C.  No histologic evidence of primary or metastatic malignancy.    2.  Pericardium, biopsy:  A.  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  B.  No histologic evidence of malignancy.  C.  Kinyoun and GMS stains are negative for acid-fast bacilli and fungal organisms, respectively.    3. Peripericardial lymph nodes, excisional biopsy:  A.  Lymph nodes (3) demonstrating changes of acute lymphadenitis, mild, and reactive lymphoid hyperplasia.  B.  No abscesses or granulomata identified.  C.  No histologic evidence of primary or metastatic malignancy.       Gross Description --     Specimen #1 is received in a formalin filled container, labeled with the patient's name, date of birth, and \"pericardial lymph node #1\".  The specimen consists of a yellow-pink soft tissue rubbery tissue fragment measuring 1.5 x 1.3 x 0.5 cm.  The external surface has a slightly lobulated appearance.  The cut surface is yellow-pink and rubbery.  The specimen is bisected and totally submitted in block 1A.    Specimen #2 is received fresh labeled with the patient's name, date of birth, and \"pericardial biopsy\".  The OR is contacted and IVÁN Farias, confirms no frozen section or cultures are requested on specimen.  The specimen consists of a " "thickened flap of rubbery tissue measuring 2.3 x 1.6 cm and measuring 0.6 cm in thickness.  The external surface appears slightly roughened with cautery artifact.  One side of the flap is inked blue, the opposite side is inked black.  The cut surface is dark red-brown and appears slightly hemorrhagic.  The specimen is totally submitted in blocks 2A and 2B.    Specimen #3 is received in a formalin filled container, labeled with the patient's name, date of birth, and \"pericardial lymph node #2\".  The specimen consists of a yellow-pink soft and rubbery tissue fragment measuring 1.5 x 0.9 x 0.5 cm.  The external surface has adherent fatty tissue and is otherwise unremarkable. The cut surface is marked by at least 3 tan-pink lymph node candidates measuring up to 0.7 cm in greatest dimension.  The cut surfaces appear tan-pink and uniform.  The specimen is bisected and is totally submitted in block 3A.            Microscopic Description --     1.  Step sections of the peripericardial lymph nodes #1 reveal 2 lymph nodes demonstrating changes of reactive lymphoid hyperplasia.  Lymphoid follicles with germinal centers containing tingible body macro phages are seen.  The lymph node sinuses contain histiocytes and scattered PMN leukocytes consistent with mild acute lymphadenitis.  Abscesses are not seen and granulomata are not identified.  Ramón-Dimitrios cells are not seen.  There is no histologic evidence of primary or metastatic malignancy.    2.  Step sections of the pericardial biopsy reveal acute and chronic fibrinous pericarditis with hemorrhage.  The pleura has a thickened, fibrotic appearance.  The pleura is infiltrated by PMN leukocytes primarily although lymphocytes and plasma cells are also seen.  Granulomata are not identified.  Hemorrhage is seen and fibrin and red blood cells are seen at the surface.  There is no histologic evidence of malignancy.  Kinyoun  and GMS stains are performed on tissue from blocks 2A and " 2B.  These stains are negative for acid-fast bacilli and fungal organisms, respectively.  Appropriate working positive controls are used.    3.Step sections of the peripericardial lymph nodes #2 reveal 3 lymph nodes demonstrating changes of reactive hyperplasia.  Lymphoid follicles with germinal centers are seen containing tingible body macro phages.  The lymph node sinuses contain histiocytes and PMN leukocytes.  Abscesses or granulomata are not identified.  Ramón-Dimitrios cells are not seen.  There is no histologic evidence of primary or metastatic malignancy.      Non-gynecologic Cytology [[id number]] Collected:  19 0857    Specimen:  Body Fluid from Pericardium Updated:  01/15/19 1351     Case Report --     Non-gynecologic Cytology                          Case: [id number]                                  Authorizing Provider:  Marcelino Dougherty MD      Collected:           2019 08:57 AM          Ordering Location:     [facility] OR  Received:            2019 09:46 AM          Pathologist:           Audelia Mendez MD                                                        Specimen:    Pericardium, PERICARDIAL FLUID                                                              Final Diagnosis --     Pericardial fluid, ThinPrep preparation (1) and cell block:  A.  Marked acute inflammation.  B.  Abundant fibrin.  C.  Scattered reactive mesothelial cells.  D.  Negative for malignant cells.  E.  Kinyoun and GMS stains are negative for acid-fast bacilli and fungal organisms, respectively.       Clinical Information --     NOCARDIA AND ACTINOBACTER       Gross Description --     Received fresh in the laboratory in a container labeled with patient name and  designated as pericardial fluid.  500 mls of bloody fluid.  1 thin prep slide and 1 cell block prepared.         Microscopic Description --     ThinPrep preparation and sections of the cell block of pericardial fluid reveal marked  acute inflammation.  Sheets of PMN leukocytes are seen in the cell block.  Reactive mesothelial cells are seen scattered throughout the background.  The cell block contains abundant fibrin.  Malignant cells are not identified.  Kinyoun and GMS stains performed on the cell block are negative for acid-fast bacilli and fungal organisms, respectively.  Appropriate working positive controls are used.      ANCA Panel [[id number]] Collected:  01/10/19 1920    Specimen:  Blood Updated:  01/15/19 1314     Myeloperoxidase Ab <9.0 U/mL      Antiproteinase 3 (SD-3) <3.5 U/mL      C-ANCA <1:20 titer      P-ANCA <1:20 titer      Comment: The presence of positive fluorescence exhibiting P-ANCA or C-ANCA  patterns alone is not specific for the diagnosis of Wegener's  Granulomatosis (WG) or microscopic polyangiitis. Decisions about  treatment should not be based solely on ANCA IFA results.  The  International ANCA Group Consensus recommends follow up testing of  positive sera with both SD-3 and MPO-ANCA enzyme immunoassays. As  many as 5% serum samples are positive only by EIA.  Ref. AM J Clin Pathol 1999;111:507-513.        Atypical pANCA <1:20 titer      Comment: The atypical pANCA pattern has been observed in a significant  percentage of patients with ulcerative colitis, primary sclerosing  cholangitis and autoimmune hepatitis.       Narrative:       Performed at:  01 - [address]  [id number]  : Noé Bravo MD, Phone:  [phone]  Performed at:  02 - [address]  [id number]  : Parth Billy PhD, Phone:  [phone]    Nuclear Antigen Antibody, IFA [[id number]]  (Abnormal) Collected:  01/10/19 1920    Specimen:  Blood Updated:  01/15/19 1116     AMBERLY Positive     Comment:                                      Negative   <1:80                                       Borderline  1:80                                       Positive   >1:80        Narrative:       Performed at:  01 - [facility]  [id number]  : Parth Billy PhD, Phone:  [phone]    CORINNE Staining Patterns [[id number]] Collected:  01/10/19 1920    Specimen:  Blood Updated:  01/15/19 1116     Homogeneous Pattern 1:80     Note: (Reference) Comment     Comment: A positive AMBERLY result may occur in healthy individuals (low  titer) or be associated with a variety of diseases.  See  interpretation chart which is not all inclusive:  Pattern      Antigen Detected  Suggested Disease Association  -----------  ----------------  -----------------------------  Homogeneous  DNA(ds,ss),       SLE - High titers               Nucleosomes,               Histones          Drug-induced SLE  -----------  ----------------  -----------------------------  Speckled     Sm, RNP, SCL-70,  SLE,MCTD,PSS (diffuse form),               SS-A/SS-B         Sjogrens  -----------  ----------------  -----------------------------  Nucleolar    SCL-70, PM-1/SCL  High titers Scleroderma,                                 PM/DM  -----------  ----------------  -----------------------------  Centromere   Centromere        PSS (limited form) w/Crest                                 syndrome variable  -----------  ----------------  -----------------------------  Nuclear Dot  [id number]  Primary Biliary Cirrhosis  -----------  ----------------  -----------------------------  Nuclear      ,            Primary Biliary Cirrhosis  Membrane     spring A,B,C  -----------  ----------------  -----------------------------       Narrative:       Performed at:  [address]  [id number]  : Parth Billy PhD, Phone:  [phone]    Body Fluid Culture - Body Fluid, Pericardium [[id number]] Collected:  01/11/19 0832    Specimen:  Body Fluid from Pericardium Updated:  01/15/19 1038     BF Culture No growth at 4 days     Gram Stain Many (4+) WBCs  seen      No organisms seen    Tissue / Bone Culture - Tissue, Pericardium [[id number]] Collected:  01/11/19 0839    Specimen:  Tissue from Pericardium Updated:  01/15/19 1037     Tissue Culture No growth at 4 days     Gram Stain Moderate (3+) WBCs seen      No organisms seen    Wound Culture - Wound, Pericardium [[id number]] Collected:  01/11/19 0832    Specimen:  Wound from Pericardium Updated:  01/15/19 1037     Wound Culture No growth at 4 days     Gram Stain Few (2+) WBCs seen      No organisms seen    Anaerobic Culture - Tissue, Pericardium [[id number]] Collected:  01/11/19 0839    Specimen:  Tissue from Pericardium Updated:  01/15/19 1024     Culture No anaerobes isolated at 4 days    Anaerobic Culture - Wound, Pericardium [[id number]] Collected:  01/11/19 0832    Specimen:  Wound from Pericardium Updated:  01/15/19 1021     Culture No anaerobes isolated at 4 days    Anaerobic Culture - Body Fluid, Pericardium [[id number]] Collected:  01/11/19 0832    Specimen:  Body Fluid from Pericardium Updated:  01/15/19 1012     Culture No anaerobes isolated at 4 days    Blood Culture With MAUDE - Blood, Hand, Right [[id number]] Collected:  01/13/19 0501    Specimen:  Blood from Hand, Right Updated:  01/15/19 0530     Blood Culture No growth at 2 days    Blood Culture With MAUDE - Blood, Arm, Left [[id number]] Collected:  01/13/19 0501    Specimen:  Blood from Arm, Left Updated:  01/15/19 0530     Blood Culture No growth at 2 days    Blood Culture - Blood, Arm, Left [[id number]] Collected:  01/10/19 1920    Specimen:  Blood from Arm, Left Updated:  01/14/19 2015     Blood Culture No growth at 4 days        Imaging Results (last 72 hours)     Procedure Component Value Units Date/Time    XR Chest 1 View [[id number]] Collected:  01/15/19 0659     Updated:  01/15/19 0703    Narrative:       EXAMINATION:   XR CHEST 1 VW-  1/15/2019 6:59 AM CST     HISTORY: Cardiac tamponade benign     Frontal upright radiograph of the chest 1/15/2019 4:10  AM CST     COMPARISON: January 14, 2018.     FINDINGS:   Right lung is clear. Atelectasis left lung is again noted. Pericardial  drains are present stable and unchanged.. Cardiac silhouettes mildly  enlarged this is unchanged.      The osseous structures and surrounding soft tissues demonstrate no acute  abnormality.       Impression:       1. Stable chest with pericardial drains.  2. Atelectasis left lung.        This report was finalized on 01/15/2019 07:00 by Dr. Honorio Garay MD.    XR Chest 1 View [[id number]] Collected:  01/14/19 0711     Updated:  01/14/19 0716    Narrative:       EXAMINATION:   XR CHEST 1 VW-  1/14/2019 7:11 AM CST     HISTORY: Pericardial drain     Frontal upright radiograph of the chest 1/14/2019 4:36 AM CST     COMPARISON: January 13, 2019.     FINDINGS:   Right lung is clear. Atelectasis left lower lung is again noted. The  cardiac silhouettes enlarged.     Pericardial drains are noted     The osseous structures and surrounding soft tissues demonstrate no acute  abnormality.       Impression:       1. Atelectasis left lung base unchanged from January 13  2. Pericardial drains are noted.        This report was finalized on 01/14/2019 07:12 by Dr. Honorio Garay MD.    XR Chest 1 View [[id number]] Collected:  01/13/19 0859     Updated:  01/13/19 0904    Narrative:       History:  29-year-old with tamponade     Reference:  Chest radiograph one day prior.     Findings:  Frontal chest radiograph performed.     Stable enlargement of the cardiac/pericardial silhouette. Left basilar  opacities are unchanged, possibly combined pleural fluid and  atelectasis. There is a small right pleural effusion now seen. Mild  right basilar atelectasis.     Pericardial drains appear unchanged.          Impression:          1.. Unchanged left basilar opacities as above.  2. Suspected developing small right pleural effusion and right basilar  atelectasis.  This report was finalized on 01/13/2019 09:01 by Dr Elias  Jeffery, .              Assessment/Plan       RA, pleural and pericardial effusion  -At this point it appears cultures and pathology are fairly benign.  A rheumatoid related pleuropericardial effusion is a diagnosis of exclusion but certainly is high on the list at this point.  At any rate she does seem to be improving and I would recommend continuing the prednisone taper and the colchicine as I mentioned in my previous note.  Still okay with her home when she stable.  If she like to follow up with me from a rheumatologic standpoint I will be happy to arrange versus if she wants to find someone closer to home.      Johann Bray MD  01/15/19  6:22 PM

## 2019-01-16 NOTE — PLAN OF CARE
Problem: Patient Care Overview  Goal: Plan of Care Review  Outcome: Ongoing (interventions implemented as appropriate)   01/16/19 6302   Coping/Psychosocial   Plan of Care Reviewed With patient   Plan of Care Review   Progress improving   OTHER   Outcome Summary VSS. MST's still in place. currently output for the night is 20cc. Up ad ev. Ambulated to 4C and back twice. Up to 750 on the IS. Patient wants to get the tubes out and go home. Up ad ev. No tele. cont. to monitor. Call with concerns.

## 2019-01-16 NOTE — PROGRESS NOTES
"Urgent subxiphoid pericardial window    POD 5    Up in the chair. Working on IS, up to 750! On room air. Hoping for tube removal and home soon. Weight down 7 pounds.     Visit Vitals  /86 (BP Location: Left arm, Patient Position: Sitting)   Pulse 76   Temp 98.4 °F (36.9 °C) (Oral)   Resp 18   Ht 162.6 cm (64\")   Wt 99.7 kg (219 lb 11.2 oz)   SpO2 93%   BMI 37.71 kg/m²       Intake/Output Summary (Last 24 hours) at 1/16/2019 1006  Last data filed at 1/16/2019 0800  Gross per 24 hour   Intake 360 ml   Output 4420 ml   Net -4060 ml     MST output: 40 ml/24 hours, total 440 ml in pleurevac          Chest x ray: pericardial tubes in place, left basilar atelectasis/effusion-improved, no pneumothorax, cardiac silhouette decreased in size     Physical Exam:  General: No apparent distress. In good spirits.  Cardiovascular: Regular rate and rhythm without murmur, rubs, or gallops.    Pulmonary: Clear to auscultation bilaterally without wheezing, rubs, or rales.  Chest: Subxiphoid pericardial window incision clean, dry, and intact. Mediastinal tubes clean and dry.   Abdomen: Soft, non-distended, and non-tender.  Extremities: Warm, moves all extremities. No edema.   Neurologic: Grossly intact with no focal deficits.       Impression:  History of pericarditis  Pericardial effusion  Cardiac tamponade  History of immunosuppression  suboptimal pulmonary toilet    Plan:   diurese  Pulmonary toilet and ambulation  Routine post cardiac surgery regimen  Discontinue mediastinal tubes later today   "

## 2019-01-16 NOTE — PAYOR COMM NOTE
"1/16/18 [facility] [phone]  [phone]      FAXING UPDATE CLINICAL FOR CONT STAY.          Yakelin Guidry (29 y.o. Female)     Date of Birth Social Security Number Address Home Phone MRN    1990  [id number] [address] [phone] [id number]    [employer] Marital Status          Other        Admission Date Admission Type Admitting Provider Attending Provider Department, Room/Bed    1/10/19 Urgent Bob Lorenzo DO Hancock, John C, DO [facility] 4B, 435/1    Discharge Date Discharge Disposition Discharge Destination                       Attending Provider:  Bob Lorenzo DO    Allergies:  No Known Allergies    Isolation:  None   Infection:  None   Code Status:  CPR    Ht:  162.6 cm (64\")   Wt:  99.7 kg (219 lb 11.2 oz)    Admission Cmt:  None   Principal Problem:  None                Active Insurance as of 1/10/2019     Primary Coverage     Payor Plan Insurance Group Employer/Plan Group    [facility]      Payor Plan Address Payor Plan Phone Number Payor Plan Fax Number Effective Dates    [address]   1/10/2019 - None Entered    [facility] [id number]       Subscriber Name Subscriber Birth Date Member ID       YAKELIN GUIDRY 1990 [id number]       Outcome Summary VSS. MST's still in place. currently output for the night is 20cc. Up ad ev. Ambulated to 4C and back twice. Up to 750 on the IS. Patient wants to get the tubes out and go home. Up ad ev. No tele. cont. to monitor. Call with concerns.       Denise Rico, APRN   Nurse Practitioner   Cardiothoracic Surgery   Progress Notes   Incomplete   Date of Service:  1/16/2019 10:05 AM               Incomplete                   Show:Clear all  [x]Manual[x]Template[x]Copied    Added by:  [x]Denise Rico, APRRAJIV      []Maryana for details      Urgent subxiphoid pericardial window     POD 5     Up in the chair. Working on IS, up to 750! On " "room air. Hoping for tube removal and home soon. Weight down 7 pounds.      Visit Vitals  /86 (BP Location: Left arm, Patient Position: Sitting)   Pulse 76   Temp 98.4 °F (36.9 °C) (Oral)   Resp 18   Ht 162.6 cm (64\")   Wt 99.7 kg (219 lb 11.2 oz)   SpO2 93%   BMI 37.71 kg/m²         Intake/Output Summary (Last 24 hours) at 1/16/2019 1006  Last data filed at 1/16/2019 0800      Gross per 24 hour   Intake 360 ml   Output 4420 ml   Net -4060 ml      [facility] output: 40 ml/24 hours, total 440 ml in pleurevac            Chest x ray: pericardial tubes in place, left basilar atelectasis/effusion-improved, no pneumothorax, cardiac silhouette decreased in size      Physical Exam:  General: No apparent distress. In good spirits.  Cardiovascular: Regular rate and rhythm without murmur, rubs, or gallops.    Pulmonary: Clear to auscultation bilaterally without wheezing, rubs, or rales.  Chest: Subxiphoid pericardial window incision clean, dry, and intact. Mediastinal tubes clean and dry.   Abdomen: Soft, non-distended, and non-tender.  Extremities: Warm, moves all extremities. No edema.   Neurologic: Grossly intact with no focal deficits.        Impression:  History of pericarditis  Pericardial effusion  Cardiac tamponade  History of immunosuppression  suboptimal pulmonary toilet     Plan:   Continue mediastinal tube drainage  diurese  Pulmonary toilet and ambulation  Routine post cardiac surgery regimen                         Bob Lorenzo DO   Physician   Medicine   Progress Notes   Signed   Date of Service:  1/16/2019 10:49 AM               Signed                   Show:Clear all  [x]Manual[x]Template[x]Copied    Added by:  [x]Bob Lorenzo DO      []Maryana for details           [facility] Medicine Services  INPATIENT PROGRESS NOTE     Patient Name: Sheeba Downs  Date of Admission: 1/10/2019  Today's Date: 01/16/19  Length of Stay: 6  Primary Care Physician: Shyann Fowler NP " "in [place], [place]     Subjective   Chief Complaint: \"I want to go home.\"   HPI   Still awaiting drain removal.  This is the only thing that is holding her discharge.  Final recommendations have been made by rheumatology.  Their note has been reviewed.     Review of Systems   All pertinent negatives and positives are as above. All other systems have been reviewed and are negative unless otherwise stated.      Objective    Temp:  [97.6 °F (36.4 °C)-98.4 °F (36.9 °C)] 98.4 °F (36.9 °C)  Heart Rate:  [65-87] 76  Resp:  [16-20] 18  BP: (127-136)/(73-88) 135/86  Physical Exam  Constitutional: She is oriented to person, place, and time. She appears well-developed and well-nourished.   Up and ambulatory.  No distress. No family present. Seen and discussed with her nurse, Oneida.   Head: Normocephalic and atraumatic.   Eyes: Conjunctivae and EOM are normal. Pupils are equal, round, and reactive to light.   Neck: Neck supple. No JVD present.   Cardiovascular: Normal rate, regular rhythm, normal heart sounds and intact distal pulses. Exam reveals no gallop and no friction rub. No murmur heard. Pericardial drains in place.   Pulmonary/Chest: Effort normal and breath sounds normal. No respiratory distress. She has no wheezes. She has no rales. She exhibits no tenderness.   Abdominal: Soft. Bowel sounds are normal. She exhibits no distension. There is no tenderness. There is no rebound and no guarding.   Musculoskeletal: Normal range of motion. She exhibits no edema, tenderness or deformity.   Neurological: She is alert and oriented to person, place, and time. She displays normal reflexes. No cranial nerve deficit. She exhibits normal muscle tone.   Skin: Skin is warm and dry. No rash noted.   Psychiatric: She has a normal mood and affect. Her behavior is normal. Judgment and thought content normal.      Results Review:  I have reviewed the labs, radiology results, and diagnostic studies.     Culture Data:         Blood " Culture   Date Value Ref Range Status   01/13/2019 No growth at 3 days   Preliminary   01/13/2019 No growth at 3 days   Preliminary   01/10/2019 Staphylococcus, coagulase negative (A)   Final   01/10/2019 No growth at 5 days   Final            Wound Culture   Date Value Ref Range Status   01/11/2019 No growth at 5 days   Preliminary      Radiology Data:           Imaging Results (last 24 hours)      Procedure Component Value Units Date/Time     XR Chest 1 View [[id number]] Collected:  01/16/19 0700       Updated:  01/16/19 0704     Narrative:        EXAMINATION:   XR CHEST 1 VW-  1/16/2019 7:00 AM CST     HISTORY: Pericardial effusion     Frontal upright radiograph of the chest 1/16/2019 4:10 AM CST     COMPARISON: January 15, 2019.     FINDINGS:   The right lung is clear. Atelectasis left lower lobe is noted.. The  cardiac silhouette is mildly enlarged. Pericardial drains are noted.      The osseous structures and surrounding soft tissues demonstrate no acute  abnormality.        Impression:        1. Stable chest. Atelectasis left lower lobes again noted.  2. Pericardial drains are stable..        This report was finalized on 01/16/2019 07:01 by Dr. Honorio Garay MD.          I have reviewed the patient's current medications.      Assessment/Plan           Active Hospital Problems     Diagnosis   • Microcytic anemia   • Bacteremia       Likely contaminant       • Acute hypoxemic respiratory failure (CMS/HCC)   • Cardiac tamponade   • Pleural effusion   • Sinus tachycardia   • Pericarditis   • Rheumatoid arthritis (CMS/HCC)   • Sickle cell trait (CMS/HCC)   • Chest pain   • Acute pericarditis associated with rheumatoid arthritis (CMS/HCC)       Added automatically from request for surgery [id number]         Plan:   The patient originally presented on 1/10 as a transfer from [facility] in [place], [place].  Transfer was initially sought at [place] or [facility], but both  facilities were at capacity.     The patient had a recent (18) delivery of a healthy baby girl by .  She developed problems with shortness of breath and chest discomfort thereafter.  She was found to have a moderate left pleural effusion and a large pericardial effusion with evidence of tamponade physiology.  After stabilization here, she underwent a subxiphoid pericardial window with Dr. Abreu on .  Transthoracic echocardiogram repeated on  as above. Fluid studies remain unrevealing thus far.     Patient has no intention to breast feed.      She also has a history of rheumatoid arthritis.  Dr. Bray has seen her.  She has been placed on a prednisone taper and is also receiving colchicine.  He thinks that her issues could in fact be related to rheumatoid arthritis.  She does take Plaquenil chronically.     She has had one blood culture come back positive for coagulase-negative Staphylococcus.  This is likely contaminant. Surveillance cultures negative.     Checked anemia substrates, which are consistent with iron deficiency. IV Venofer.      Continue Lovenox for DVT prophylaxis.     Discharge Planning: Discharge when drains have been removed and okay with cardiothoracic surgery.  She has already been cleared for discharge with final recommendations by rheumatology.     Bob Lorenzo DO   19   10:49 AM                                               Hospital Medications (active)       Dose Frequency Start End    acetaminophen (TYLENOL) tablet 650 mg 650 mg Every 6 Hours PRN 2019     Sig - Route: Take 2 tablets by mouth Every 6 (Six) Hours As Needed for Mild Pain . - Oral    bisacodyl (DULCOLAX) suppository 10 mg 10 mg Daily PRN 2019     Sig - Route: Insert 1 suppository into the rectum Daily As Needed for Constipation. - Rectal    colchicine tablet 0.6 mg 0.6 mg Every 12 Hours Scheduled 2019     Sig - Route: Take 1 tablet by mouth Every 12 (Twelve) Hours. - Oral     "docusate sodium (COLACE) capsule 100 mg 100 mg 2 Times Daily 1/11/2019     Sig - Route: Take 1 capsule by mouth 2 (Two) Times a Day. - Oral    enoxaparin (LOVENOX) syringe 30 mg 30 mg Every 12 Hours Scheduled 1/12/2019     Sig - Route: Inject 0.3 mL under the skin into the appropriate area as directed Every 12 (Twelve) Hours. - Subcutaneous    furosemide (LASIX) injection 20 mg 20 mg 2 Times Daily Before Meals 1/15/2019     Sig - Route: Infuse 2 mL into a venous catheter 2 (Two) Times a Day Before Meals. - Intravenous    iron sucrose (VENOFER) 200 mg in Sodium chloride 0.9 % 100 mL IVPB 200 mg Once 1/16/2019     Sig - Route: Infuse 200 mg into a venous catheter 1 (One) Time. - Intravenous    metoprolol tartrate (LOPRESSOR) tablet 12.5 mg 12.5 mg Every 12 Hours Scheduled 1/11/2019     Sig - Route: Take 0.5 tablets by mouth Every 12 (Twelve) Hours. - Oral    ondansetron (ZOFRAN) injection 4 mg 4 mg Every 6 Hours PRN 1/11/2019     Sig - Route: Infuse 2 mL into a venous catheter Every 6 (Six) Hours As Needed for Nausea or Vomiting. - Intravenous    Linked Group 1:  \"Or\" Linked Group Details        ondansetron (ZOFRAN) tablet 4 mg 4 mg Every 6 Hours PRN 1/11/2019     Sig - Route: Take 1 tablet by mouth Every 6 (Six) Hours As Needed for Nausea or Vomiting. - Oral    Linked Group 1:  \"Or\" Linked Group Details        ondansetron ODT (ZOFRAN-ODT) disintegrating tablet 4 mg 4 mg Every 6 Hours PRN 1/11/2019     Sig - Route: Take 1 tablet by mouth Every 6 (Six) Hours As Needed for Nausea or Vomiting. - Oral    Linked Group 1:  \"Or\" Linked Group Details        oxyCODONE-acetaminophen (PERCOCET) 5-325 MG per tablet 1 tablet 1 tablet Every 3 Hours PRN 1/11/2019 1/21/2019    Sig - Route: Take 1 tablet by mouth Every 3 (Three) Hours As Needed for Moderate Pain . - Oral    polyethylene glycol 3350 powder (packet) 17 g Daily 1/11/2019     Sig - Route: Take 17 g by mouth Daily. - Oral    potassium chloride (MICRO-K) CR capsule 20 mEq " "20 mEq 2 Times Daily With Meals 1/15/2019     Sig - Route: Take 2 capsules by mouth 2 (Two) Times a Day With Meals. - Oral    predniSONE (DELTASONE) tablet 10 mg 10 mg Daily 1/15/2019 1/18/2019    Sig - Route: Take 1 tablet by mouth Daily. - Oral    Linked Group 2:  \"Followed by\" Linked Group Details        predniSONE (DELTASONE) tablet 5 mg 5 mg Daily 1/18/2019 1/21/2019    Sig - Route: Take 1 tablet by mouth Daily. - Oral    Linked Group 2:  \"Followed by\" Linked Group Details              "

## 2019-01-16 NOTE — PROGRESS NOTES
"    [facility] Medicine Services  INPATIENT PROGRESS NOTE    Patient Name: Sheeba Downs  Date of Admission: 1/10/2019  Today's Date: 01/16/19  Length of Stay: 6  Primary Care Physician: Shyann Fowler NP in [place], [place]    Subjective   Chief Complaint: \"I want to go home.\"   HPI   Still awaiting drain removal.  This is the only thing that is holding her discharge.  Final recommendations have been made by rheumatology.  Their note has been reviewed.    Review of Systems   All pertinent negatives and positives are as above. All other systems have been reviewed and are negative unless otherwise stated.     Objective    Temp:  [97.6 °F (36.4 °C)-98.4 °F (36.9 °C)] 98.4 °F (36.9 °C)  Heart Rate:  [65-87] 76  Resp:  [16-20] 18  BP: (127-136)/(73-88) 135/86  Physical Exam  Constitutional: She is oriented to person, place, and time. She appears well-developed and well-nourished.   Up and ambulatory.  No distress. No family present. Seen and discussed with her nurse, Oneida.   Head: Normocephalic and atraumatic.   Eyes: Conjunctivae and EOM are normal. Pupils are equal, round, and reactive to light.   Neck: Neck supple. No JVD present.   Cardiovascular: Normal rate, regular rhythm, normal heart sounds and intact distal pulses. Exam reveals no gallop and no friction rub. No murmur heard. Pericardial drains in place.   Pulmonary/Chest: Effort normal and breath sounds normal. No respiratory distress. She has no wheezes. She has no rales. She exhibits no tenderness.   Abdominal: Soft. Bowel sounds are normal. She exhibits no distension. There is no tenderness. There is no rebound and no guarding.   Musculoskeletal: Normal range of motion. She exhibits no edema, tenderness or deformity.   Neurological: She is alert and oriented to person, place, and time. She displays normal reflexes. No cranial nerve deficit. She exhibits normal muscle tone.   Skin: Skin is warm and dry. No " rash noted.   Psychiatric: She has a normal mood and affect. Her behavior is normal. Judgment and thought content normal.     Results Review:  I have reviewed the labs, radiology results, and diagnostic studies.    Culture Data:   Blood Culture   Date Value Ref Range Status   01/13/2019 No growth at 3 days  Preliminary   01/13/2019 No growth at 3 days  Preliminary   01/10/2019 Staphylococcus, coagulase negative (A)  Final   01/10/2019 No growth at 5 days  Final     Wound Culture   Date Value Ref Range Status   01/11/2019 No growth at 5 days  Preliminary     Radiology Data:   Imaging Results (last 24 hours)     Procedure Component Value Units Date/Time    XR Chest 1 View [[id number]] Collected:  01/16/19 0700     Updated:  01/16/19 0704    Narrative:       EXAMINATION:   XR CHEST 1 VW-  1/16/2019 7:00 AM CST     HISTORY: Pericardial effusion     Frontal upright radiograph of the chest 1/16/2019 4:10 AM CST     COMPARISON: January 15, 2019.     FINDINGS:   The right lung is clear. Atelectasis left lower lobe is noted.. The  cardiac silhouette is mildly enlarged. Pericardial drains are noted.      The osseous structures and surrounding soft tissues demonstrate no acute  abnormality.       Impression:       1. Stable chest. Atelectasis left lower lobes again noted.  2. Pericardial drains are stable..        This report was finalized on 01/16/2019 07:01 by Dr. Honorio Garay MD.        I have reviewed the patient's current medications.     Assessment/Plan     Active Hospital Problems    Diagnosis   • Microcytic anemia   • Bacteremia     Likely contaminant      • Acute hypoxemic respiratory failure (CMS/HCC)   • Cardiac tamponade   • Pleural effusion   • Sinus tachycardia   • Pericarditis   • Rheumatoid arthritis (CMS/HCC)   • Sickle cell trait (CMS/HCC)   • Chest pain   • Acute pericarditis associated with rheumatoid arthritis (CMS/HCC)     Added automatically from request for surgery [id number]       Plan:   The patient  originally presented on 1/10 as a transfer from [facility] in [place], [place].  Transfer was initially sought at [place] or [facility], but both facilities were at capacity.     The patient had a recent (18) delivery of a healthy baby girl by .  She developed problems with shortness of breath and chest discomfort thereafter.  She was found to have a moderate left pleural effusion and a large pericardial effusion with evidence of tamponade physiology.  After stabilization here, she underwent a subxiphoid pericardial window with Dr. Abreu on .  Transthoracic echocardiogram repeated on  as above. Fluid studies remain unrevealing thus far.     Patient has no intention to breast feed.      She also has a history of rheumatoid arthritis.  Dr. Bray has seen her.  She has been placed on a prednisone taper and is also receiving colchicine.  He thinks that her issues could in fact be related to rheumatoid arthritis.  She does take Plaquenil chronically.     She has had one blood culture come back positive for coagulase-negative Staphylococcus.  This is likely contaminant. Surveillance cultures negative.     Checked anemia substrates, which are consistent with iron deficiency. IV Venofer.      Continue Lovenox for DVT prophylaxis.     Discharge Planning: Discharge when drains have been removed and okay with cardiothoracic surgery.  She has already been cleared for discharge with final recommendations by rheumatology.    Bob Lorenzo DO   19   10:49 AM

## 2019-01-17 ENCOUNTER — APPOINTMENT (OUTPATIENT)
Dept: GENERAL RADIOLOGY | Facility: HOSPITAL | Age: 29
End: 2019-01-17

## 2019-01-17 VITALS
HEIGHT: 64 IN | WEIGHT: 219.5 LBS | HEART RATE: 65 BPM | BODY MASS INDEX: 37.47 KG/M2 | TEMPERATURE: 98.9 F | OXYGEN SATURATION: 95 % | RESPIRATION RATE: 16 BRPM | DIASTOLIC BLOOD PRESSURE: 70 MMHG | SYSTOLIC BLOOD PRESSURE: 121 MMHG

## 2019-01-17 PROBLEM — J96.01 ACUTE HYPOXEMIC RESPIRATORY FAILURE (HCC): Status: RESOLVED | Noted: 2019-01-11 | Resolved: 2019-01-17

## 2019-01-17 PROBLEM — R00.0 SINUS TACHYCARDIA: Status: RESOLVED | Noted: 2019-01-10 | Resolved: 2019-01-17

## 2019-01-17 PROBLEM — I31.4 CARDIAC TAMPONADE: Status: RESOLVED | Noted: 2019-01-10 | Resolved: 2019-01-17

## 2019-01-17 PROBLEM — R78.81 BACTEREMIA: Status: RESOLVED | Noted: 2019-01-12 | Resolved: 2019-01-17

## 2019-01-17 PROBLEM — R07.9 CHEST PAIN: Status: RESOLVED | Noted: 2019-01-10 | Resolved: 2019-01-17

## 2019-01-17 LAB
ANION GAP SERPL CALCULATED.3IONS-SCNC: 8 MMOL/L (ref 4–13)
BACTERIA SPEC AEROBE CULT: NORMAL
BACTERIA SPEC ANAEROBE CULT: NORMAL
BUN BLD-MCNC: 11 MG/DL (ref 5–21)
BUN/CREAT SERPL: 22.4 (ref 7–25)
CALCIUM SPEC-SCNC: 7.9 MG/DL (ref 8.4–10.4)
CHLORIDE SERPL-SCNC: 97 MMOL/L (ref 98–110)
CO2 SERPL-SCNC: 34 MMOL/L (ref 24–31)
CREAT BLD-MCNC: 0.49 MG/DL (ref 0.5–1.4)
DEPRECATED RDW RBC AUTO: 46 FL (ref 40–54)
ERYTHROCYTE [DISTWIDTH] IN BLOOD BY AUTOMATED COUNT: 15.1 % (ref 12–15)
GFR SERPL CREATININE-BSD FRML MDRD: >150 ML/MIN/1.73
GLUCOSE BLD-MCNC: 97 MG/DL (ref 70–100)
GRAM STN SPEC: NORMAL
GRAM STN SPEC: NORMAL
HCT VFR BLD AUTO: 27.3 % (ref 37–47)
HGB BLD-MCNC: 8.8 G/DL (ref 12–16)
MCH RBC QN AUTO: 26.9 PG (ref 28–32)
MCHC RBC AUTO-ENTMCNC: 32.2 G/DL (ref 33–36)
MCV RBC AUTO: 83.5 FL (ref 82–98)
PLATELET # BLD AUTO: 455 10*3/MM3 (ref 130–400)
PMV BLD AUTO: 9.7 FL (ref 6–12)
POTASSIUM BLD-SCNC: 3.6 MMOL/L (ref 3.5–5.3)
RBC # BLD AUTO: 3.27 10*6/MM3 (ref 4.2–5.4)
SODIUM BLD-SCNC: 139 MMOL/L (ref 135–145)
WBC NRBC COR # BLD: 9.15 10*3/MM3 (ref 4.8–10.8)

## 2019-01-17 PROCEDURE — 99024 POSTOP FOLLOW-UP VISIT: CPT | Performed by: NURSE PRACTITIONER

## 2019-01-17 PROCEDURE — 63710000001 PREDNISONE PER 5 MG: Performed by: INTERNAL MEDICINE

## 2019-01-17 PROCEDURE — 71045 X-RAY EXAM CHEST 1 VIEW: CPT

## 2019-01-17 PROCEDURE — 85027 COMPLETE CBC AUTOMATED: CPT | Performed by: INTERNAL MEDICINE

## 2019-01-17 PROCEDURE — 80048 BASIC METABOLIC PNL TOTAL CA: CPT | Performed by: INTERNAL MEDICINE

## 2019-01-17 RX ORDER — PREDNISONE 10 MG/1
TABLET ORAL
Qty: 8 TABLET | Refills: 0 | Status: SHIPPED | OUTPATIENT
Start: 2019-01-17

## 2019-01-17 RX ORDER — COLCHICINE 0.6 MG/1
0.6 TABLET ORAL EVERY 12 HOURS SCHEDULED
Qty: 14 TABLET | Refills: 0 | Status: SHIPPED | OUTPATIENT
Start: 2019-01-17

## 2019-01-17 RX ORDER — OXYCODONE HYDROCHLORIDE AND ACETAMINOPHEN 5; 325 MG/1; MG/1
1 TABLET ORAL EVERY 6 HOURS PRN
Qty: 12 TABLET | Refills: 0 | Status: SHIPPED | OUTPATIENT
Start: 2019-01-17 | End: 2019-01-21

## 2019-01-17 RX ADMIN — METOPROLOL TARTRATE 12.5 MG: 25 TABLET, FILM COATED ORAL at 09:08

## 2019-01-17 RX ADMIN — COLCHICINE 0.6 MG: 0.6 TABLET, FILM COATED ORAL at 09:08

## 2019-01-17 RX ADMIN — PREDNISONE 10 MG: 10 TABLET ORAL at 09:08

## 2019-01-17 NOTE — PROGRESS NOTES
"Urgent subxiphoid pericardial window    POD 6    \"ready to go home\" Up in the chair. Good pain control.     Visit Vitals  /70 (BP Location: Left arm, Patient Position: Lying)   Pulse 65   Temp 98.9 °F (37.2 °C) (Temporal)   Resp 16   Ht 162.6 cm (64\")   Wt 99.6 kg (219 lb 8 oz)   SpO2 95%   BMI 37.68 kg/m²       Intake/Output Summary (Last 24 hours) at 1/17/2019 1139  Last data filed at 1/17/2019 0903  Gross per 24 hour   Intake 960 ml   Output 2620 ml   Net -1660 ml             Chest x ray: left basilar atelectasis/effusion-improved, no pneumothorax, cardiac silhouette decreased in size     Physical Exam:  General: No apparent distress. In good spirits.  Cardiovascular: Regular rate and rhythm without murmur, rubs, or gallops.    Pulmonary: Clear to auscultation bilaterally without wheezing, rubs, or rales.  Chest: Subxiphoid pericardial window incision clean, dry, and intact.   Abdomen: Soft, non-distended, and non-tender.  Extremities: Warm, moves all extremities. No edema.   Neurologic: Grossly intact with no focal deficits.       Impression:  History of pericarditis  Pericardial effusion  Cardiac tamponade  History of immunosuppression  suboptimal pulmonary toilet    Plan:   OK to DC home from cardiothoracic surgery standpoint  Routine post cardiac surgery regimen  FU in 1 month with CXR     "

## 2019-01-17 NOTE — PLAN OF CARE
Problem: Patient Care Overview  Goal: Plan of Care Review  Outcome: Ongoing (interventions implemented as appropriate)   01/17/19 0221   Coping/Psychosocial   Plan of Care Reviewed With patient   Plan of Care Review   Progress improving   OTHER   Outcome Summary VSS BP WNL SR on tele. Mild complaints of incisonal pain with PRN pain meds given with relief. Reassess as needed. -1000. Encourage ambulation. Incision CDI. Pending CXR 1 View and AM labs. Possible discharge home today?      Goal: Individualization and Mutuality  Outcome: Ongoing (interventions implemented as appropriate)    Goal: Discharge Needs Assessment  Outcome: Ongoing (interventions implemented as appropriate)    Goal: Interprofessional Rounds/Family Conf  Outcome: Ongoing (interventions implemented as appropriate)

## 2019-01-17 NOTE — DISCHARGE SUMMARY
[facility] Medicine Services  DISCHARGE SUMMARY       Date of Admission: 1/10/2019  Date of Discharge:  1/17/2019  Primary Care Physician: Shyann Fowler NP in [place], [place].     Presenting Problem/History of Present Illness:  Shortness of breath, chest pain.     Final Discharge Diagnoses:  Patient Active Problem List   Diagnosis   • Pleural effusion   • Pericarditis   • Rheumatoid arthritis (CMS/HCC)   • Sickle cell trait (CMS/HCC)   • Acute pericarditis associated with rheumatoid arthritis (CMS/HCC)   • Microcytic anemia     Consults:   1. Dr. Dougherty with CTS.   2. Dr. Bray with rheumatology.     Procedures Performed: Pericardial window and drain placement by Dr. Dougherty on 1/11/19. Remaining drains removed on 1/16/19.     Pertinent Test Results:   Results for orders placed during the hospital encounter of 01/10/19   Adult Transthoracic Echo Complete W/ Cont if Necessary Per Protocol    Narrative · Left ventricular systolic function is normal. Estimated EF appears to be   in the range of 56 - 60%.  · Normal right ventricular cavity size and systolic function noted.  · Trace mitral valve regurgitation is present.  · Trace to mild tricuspid valve regurgitation is present. Estimated right   ventricular systolic pressure from tricuspid regurgitation is normal (<35   mmHg).  · There is a trivial pericardial effusion. There is no evidence of cardiac   tamponade.        Imaging Results (last 7 days)     Procedure Component Value Units Date/Time    XR Chest 1 View [[id number]] Collected:  01/17/19 0720     Updated:  01/17/19 0724    Narrative:       EXAMINATION:   XR CHEST 1 VW-  1/17/2019 7:20 AM CST     HISTORY: Cardiac tamponade not     Frontal upright radiograph of the chest 1/17/2019 3:50 AM CST     COMPARISON: January 16, 2019.     FINDINGS:   The lungs are clear. Cardiac silhouettes mildly enlarged. Pericardial  catheters have been removed compared to prior exam.       The osseous structures and surrounding soft tissues demonstrate no acute  abnormality.       Impression:       1. Cardiomegaly no evidence pulmonary vascular congestion.        This report was finalized on 01/17/2019 07:21 by Dr. Honorio Garay MD.    XR Chest 1 View [[id number]] Collected:  01/16/19 0700     Updated:  01/16/19 0704    Narrative:       EXAMINATION:   XR CHEST 1 -  1/16/2019 7:00 AM CST     HISTORY: Pericardial effusion     Frontal upright radiograph of the chest 1/16/2019 4:10 AM CST     COMPARISON: January 15, 2019.     FINDINGS:   The right lung is clear. Atelectasis left lower lobe is noted.. The  cardiac silhouette is mildly enlarged. Pericardial drains are noted.      The osseous structures and surrounding soft tissues demonstrate no acute  abnormality.       Impression:       1. Stable chest. Atelectasis left lower lobes again noted.  2. Pericardial drains are stable..        This report was finalized on 01/16/2019 07:01 by Dr. Honorio Garay MD.    XR Chest 1 View [[id number]] Collected:  01/15/19 0659     Updated:  01/15/19 0703    Narrative:       EXAMINATION:   XR CHEST 1 -  1/15/2019 6:59 AM CST     HISTORY: Cardiac tamponade benign     Frontal upright radiograph of the chest 1/15/2019 4:10 AM CST     COMPARISON: January 14, 2018.     FINDINGS:   Right lung is clear. Atelectasis left lung is again noted. Pericardial  drains are present stable and unchanged.. Cardiac silhouettes mildly  enlarged this is unchanged.      The osseous structures and surrounding soft tissues demonstrate no acute  abnormality.       Impression:       1. Stable chest with pericardial drains.  2. Atelectasis left lung.        This report was finalized on 01/15/2019 07:00 by Dr. Honorio Garay MD.    XR Chest 1 View [[id number]] Collected:  01/14/19 0711     Updated:  01/14/19 0716    Narrative:       EXAMINATION:   XR CHEST 1 -  1/14/2019 7:11 AM CST     HISTORY: Pericardial drain     Frontal upright  radiograph of the chest 1/14/2019 4:36 AM CST     COMPARISON: January 13, 2019.     FINDINGS:   Right lung is clear. Atelectasis left lower lung is again noted. The  cardiac silhouettes enlarged.     Pericardial drains are noted     The osseous structures and surrounding soft tissues demonstrate no acute  abnormality.       Impression:       1. Atelectasis left lung base unchanged from January 13  2. Pericardial drains are noted.        This report was finalized on 01/14/2019 07:12 by Dr. Honorio Garay MD.    XR Chest 1 View [[id number]] Collected:  01/13/19 0859     Updated:  01/13/19 0904    Narrative:       History:  29-year-old with tamponade     Reference:  Chest radiograph one day prior.     Findings:  Frontal chest radiograph performed.     Stable enlargement of the cardiac/pericardial silhouette. Left basilar  opacities are unchanged, possibly combined pleural fluid and  atelectasis. There is a small right pleural effusion now seen. Mild  right basilar atelectasis.     Pericardial drains appear unchanged.          Impression:          1.. Unchanged left basilar opacities as above.  2. Suspected developing small right pleural effusion and right basilar  atelectasis.  This report was finalized on 01/13/2019 09:01 by Dr Curt Gil, .    XR Chest 1 View [[id number]] Collected:  01/12/19 0804     Updated:  01/12/19 0810    Narrative:       History:  29-year-old with tamponade     Reference:  Chest radiograph one day prior.     Findings:  Frontal chest radiograph performed.     Enlarged cardiac/pericardial silhouette, grossly stable. Pericardial  drains are stable. Opacities at the lingula and left base may be  combined pleural fluid and atelectasis. Slight improved aeration of the  left upper lobe. Right lung is grossly clear. No large pleural effusion  or pneumothorax.          Impression:       Stable pericardial drains. No overt change from yesterday.  This report was finalized on 01/12/2019 08:07 by Dr Elias  Jeffery .    XR Chest 1 View [[id number]] Collected:  01/11/19 1040     Updated:  01/11/19 1058    Narrative:       EXAMINATION:   XR CHEST 1 -  1/11/2019 10:40 AM CST     HISTORY: Paracardial drain     Frontal upright radiograph of the chest 1/11/2019 10:28 AM CST     COMPARISON: January 10, 2019.     FINDINGS:   The right lung is clear. A moderate left pleural effusion is present..  The cardiac silhouettes moderately enlarged.     PeriCardial drains are present..      The osseous structures and surrounding soft tissues demonstrate no acute  abnormality.       Impression:       1. 2 drains are noted. These are pericardial drains     Moderate left pleural effusion.        This report was finalized on 01/11/2019 10:55 by Dr. Honorio Garay MD.    XR Chest 1 View [[id number]] Collected:  01/10/19 2132     Updated:  01/10/19 2136    Narrative:       EXAMINATION:  XR CHEST 1 -  1/10/2019 9:27 PM CST     HISTORY: I31.4-Cardiac tamponade; N56-Vsdwhek effusion, not elsewhere  classified; I30.9-Acute pericarditis, unspecified; M06.9-Rheumatoid  arthritis, unspecified; M06.9-Rheumatoid arthritis, unspecified     COMPARISON: No comparison study.     FINDINGS:  There is very poor inspiration. There is dense infiltrate at  the left lung base. The left hemidiaphragm is obscured. There is patchy  infiltrate at the right lung base. There is at least moderate  cardiomegaly. The left heart border is obscured by the infiltrate on the  left.       Impression:       1. Poor inspiration.  2. Dense infiltrate on the left. The left hemidiaphragm is obscured. The  left heart border is obscured. This may represent pneumonia and/or  pleural effusion.  3. Mild patchy infiltrate at the right base.  4. Heart size is difficult to evaluate due to the infiltrate on the  left. I suspect there is at least moderate cardiomegaly.        This report was finalized on 01/10/2019 21:33 by Dr. José Miguel Busby MD.        Lab Results (last 7 days)      Procedure Component Value Units Date/Time    Body Fluid Culture - Body Fluid, Pericardium [[id number]] Collected:  01/11/19 0832    Specimen:  Body Fluid from Pericardium Updated:  01/17/19 0833     BF Culture No growth at 6 days     Gram Stain Many (4+) WBCs seen      No organisms seen    Tissue / Bone Culture - Tissue, Pericardium [[id number]] Collected:  01/11/19 0839    Specimen:  Tissue from Pericardium Updated:  01/17/19 0832     Tissue Culture No growth at 6 days     Gram Stain Moderate (3+) WBCs seen      No organisms seen    Wound Culture - Wound, Pericardium [[id number]] Collected:  01/11/19 0832    Specimen:  Wound from Pericardium Updated:  01/17/19 0832     Wound Culture No growth at 5 days     Gram Stain Few (2+) WBCs seen      No organisms seen    Blood Culture With MAUDE - Blood, Hand, Right [[id number]] Collected:  01/13/19 0501    Specimen:  Blood from Hand, Right Updated:  01/17/19 0530     Blood Culture No growth at 4 days    Blood Culture With MAUDE - Blood, Arm, Left [[id number]] Collected:  01/13/19 0501    Specimen:  Blood from Arm, Left Updated:  01/17/19 0530     Blood Culture No growth at 4 days    Basic Metabolic Panel [[id number]]  (Abnormal) Collected:  01/17/19 0252    Specimen:  Blood Updated:  01/17/19 0355     Glucose 97 mg/dL      BUN 11 mg/dL      Creatinine 0.49 mg/dL      Sodium 139 mmol/L      Potassium 3.6 mmol/L      Chloride 97 mmol/L      CO2 34.0 mmol/L      Calcium 7.9 mg/dL      eGFR  African Amer >150 mL/min/1.73      BUN/Creatinine Ratio 22.4     Anion Gap 8.0 mmol/L     Narrative:       GFR Normal >60  Chronic Kidney Disease <60  Kidney Failure <15    CBC (No Diff) [[id number]]  (Abnormal) Collected:  01/17/19 0252    Specimen:  Blood Updated:  01/17/19 0337     WBC 9.15 10*3/mm3      RBC 3.27 10*6/mm3      Hemoglobin 8.8 g/dL      Hematocrit 27.3 %      MCV 83.5 fL      MCH 26.9 pg      MCHC 32.2 g/dL      RDW 15.1 %      RDW-SD 46.0 fl      MPV 9.7 fL      Platelets 455  10*3/mm3     Anaerobic Culture - Tissue, Pericardium [[id number]] Collected:  01/11/19 0839    Specimen:  Tissue from Pericardium Updated:  01/16/19 1218     Culture No anaerobes isolated at 5 days    Anaerobic Culture - Wound, Pericardium [[id number]] Collected:  01/11/19 0832    Specimen:  Wound from Pericardium Updated:  01/16/19 1218     Culture No anaerobes isolated at 5 days    Anaerobic Culture - Body Fluid, Pericardium [[id number]] Collected:  01/11/19 0832    Specimen:  Body Fluid from Pericardium Updated:  01/16/19 1217     Culture No anaerobes isolated at 5 days    Fungus Culture - Body Fluid, Pericardium [[id number]] Collected:  01/11/19 0832    Specimen:  Body Fluid from Pericardium Updated:  01/16/19 0947     Fungus Culture No fungus isolated at less than 1 week    AFB Culture - Body Fluid, Pericardium [[id number]] Collected:  01/11/19 0832    Specimen:  Body Fluid from Pericardium Updated:  01/16/19 0947     AFB Culture No AFB isolated at less than 1 week     AFB Stain No acid fast bacilli seen on direct smear      No acid fast bacilli seen on concentrated smear    Fungus Culture - Tissue, Pericardium [[id number]] Collected:  01/11/19 0839    Specimen:  Tissue from Pericardium Updated:  01/16/19 0900     Fungus Culture No fungus isolated at less than 1 week    AFB Culture - Tissue, Pericardium [[id number]] Collected:  01/11/19 0839    Specimen:  Tissue from Pericardium Updated:  01/16/19 0900     AFB Culture No AFB isolated at less than 1 week     AFB Stain No acid fast bacilli seen on direct smear      No acid fast bacilli seen on concentrated smear    QuantiFERON TB Plus Client Incubated [[id number]]  (Abnormal) Collected:  01/11/19 0448    Specimen:  Blood Updated:  01/16/19 0731     QuantiFERON Criteria Comment     Comment: The QuantiFERON-TB Gold Plus result is determined by subtracting  the Nil value from either TB antigen (Ag) tube. The mitogen tube  serves as a control for the test.        QUANTIFERON  TB1 AG VALUE 0.03 IU/mL      QUANTIFERON TB2 AG VALUE 0.03 IU/mL      QuantiFERON Nil Value 0.03 IU/mL      QuantiFERON Mitogen Value 0.04 IU/mL      QUANTIFERON-TB GOLD PLUS Indeterminate     Comment: Mitogen (positive control) gave low response.  This may indicate anergy or immune suppression. Early draws and  extended transit time may also result in low positive control and  indeterminate results.  The specimen received for QuantiFERON testing was incubated by the  ordering institution.  Specific procedures outlined in our Directory  of Services and in the package insert for the QuantiFERON Gold  (In Tube) test must be followed to enable for proper stimulation of  cells for the production of interferon gamma.       Narrative:       Performed at:  [address] [place] [address]  [id number]  : Parth Billy PhD, Phone:  [phone]    Blood Culture - Blood, Arm, Left [[id number]] Collected:  01/10/19 1920    Specimen:  Blood from Arm, Left Updated:  01/15/19 2015     Blood Culture No growth at 5 days    Tissue Pathology Exam [[id number]] Collected:  01/11/19 0843    Specimen:  Tissue from Pericardium, Tissue from Pericardium, Tissue from Pericardium Updated:  01/15/19 1358     Case Report --     Surgical Pathology Report                         Case: [id number]                                  Authorizing Provider:  Marcelino Dougherty MD      Collected:           01/11/2019 08:43 AM          Ordering Location:     [facility] OR  Received:            01/11/2019 09:03 AM          Pathologist:           Audelia Mendez MD                                                        Specimens:   1) - Pericardium, PERIPERICARDIAL LYMPH NODE #1                                                     2) - Pericardium, PERICARDIAL BIOPSY                                                                3) - Pericardium, PERIPERICARDIAL LYMPH NODE #2                                          "    Final Diagnosis --     1.Peripericardial lymph nodes, excisional biopsy:  A.  Lymph nodes (2) demonstrating changes of acute lymphadenitis, mild, and reactive lymphoid hyperplasia.  B.  No abscesses or granulomata identified.  C.  No histologic evidence of primary or metastatic malignancy.    2.  Pericardium, biopsy:  A.  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  B.  No histologic evidence of malignancy.  C.  Kinyoun and GMS stains are negative for acid-fast bacilli and fungal organisms, respectively.    3. Peripericardial lymph nodes, excisional biopsy:  A.  Lymph nodes (3) demonstrating changes of acute lymphadenitis, mild, and reactive lymphoid hyperplasia.  B.  No abscesses or granulomata identified.  C.  No histologic evidence of primary or metastatic malignancy.       Gross Description --     Specimen #1 is received in a formalin filled container, labeled with the patient's name, date of birth, and \"pericardial lymph node #1\".  The specimen consists of a yellow-pink soft tissue rubbery tissue fragment measuring 1.5 x 1.3 x 0.5 cm.  The external surface has a slightly lobulated appearance.  The cut surface is yellow-pink and rubbery.  The specimen is bisected and totally submitted in block 1A.    Specimen #2 is received fresh labeled with the patient's name, date of birth, and \"pericardial biopsy\".  The OR is contacted and IVÁN Farias, confirms no frozen section or cultures are requested on specimen.  The specimen consists of a thickened flap of rubbery tissue measuring 2.3 x 1.6 cm and measuring 0.6 cm in thickness.  The external surface appears slightly roughened with cautery artifact.  One side of the flap is inked blue, the opposite side is inked black.  The cut surface is dark red-brown and appears slightly hemorrhagic.  The specimen is totally submitted in blocks 2A and 2B.    Specimen #3 is received in a formalin filled container, labeled with the patient's name, date of birth, and " "\"pericardial lymph node #2\".  The specimen consists of a yellow-pink soft and rubbery tissue fragment measuring 1.5 x 0.9 x 0.5 cm.  The external surface has adherent fatty tissue and is otherwise unremarkable. The cut surface is marked by at least 3 tan-pink lymph node candidates measuring up to 0.7 cm in greatest dimension.  The cut surfaces appear tan-pink and uniform.  The specimen is bisected and is totally submitted in block 3A.            Microscopic Description --     1.  Step sections of the peripericardial lymph nodes #1 reveal 2 lymph nodes demonstrating changes of reactive lymphoid hyperplasia.  Lymphoid follicles with germinal centers containing tingible body macro phages are seen.  The lymph node sinuses contain histiocytes and scattered PMN leukocytes consistent with mild acute lymphadenitis.  Abscesses are not seen and granulomata are not identified.  Ramón-Dimitrios cells are not seen.  There is no histologic evidence of primary or metastatic malignancy.    2.  Step sections of the pericardial biopsy reveal acute and chronic fibrinous pericarditis with hemorrhage.  The pleura has a thickened, fibrotic appearance.  The pleura is infiltrated by PMN leukocytes primarily although lymphocytes and plasma cells are also seen.  Granulomata are not identified.  Hemorrhage is seen and fibrin and red blood cells are seen at the surface.  There is no histologic evidence of malignancy.  Kinyoun  and GMS stains are performed on tissue from blocks 2A and 2B.  These stains are negative for acid-fast bacilli and fungal organisms, respectively.  Appropriate working positive controls are used.    3.Step sections of the peripericardial lymph nodes #2 reveal 3 lymph nodes demonstrating changes of reactive hyperplasia.  Lymphoid follicles with germinal centers are seen containing tingible body macro phages.  The lymph node sinuses contain histiocytes and PMN leukocytes.  Abscesses or granulomata are not identified.  " Ramón-Dimitrios cells are not seen.  There is no histologic evidence of primary or metastatic malignancy.      Non-gynecologic Cytology [[id number]] Collected:  19 0857    Specimen:  Body Fluid from Pericardium Updated:  01/15/19 1351     Case Report --     Non-gynecologic Cytology                          Case: [id number]                                  Authorizing Provider:  Marcelino Dougherty MD      Collected:           2019 08:57 AM          Ordering Location:     [facility] OR  Received:            2019 09:46 AM          Pathologist:           Audelia Mendez MD                                                        Specimen:    Pericardium, PERICARDIAL FLUID                                                              Final Diagnosis --     Pericardial fluid, ThinPrep preparation (1) and cell block:  A.  Marked acute inflammation.  B.  Abundant fibrin.  C.  Scattered reactive mesothelial cells.  D.  Negative for malignant cells.  E.  Kinyoun and GMS stains are negative for acid-fast bacilli and fungal organisms, respectively.       Clinical Information --     NOCARDIA AND ACTINOBACTER       Gross Description --     Received fresh in the laboratory in a container labeled with patient name and  designated as pericardial fluid.  500 mls of bloody fluid.  1 thin prep slide and 1 cell block prepared.         Microscopic Description --     ThinPrep preparation and sections of the cell block of pericardial fluid reveal marked acute inflammation.  Sheets of PMN leukocytes are seen in the cell block.  Reactive mesothelial cells are seen scattered throughout the background.  The cell block contains abundant fibrin.  Malignant cells are not identified.  Kinyoun and GMS stains performed on the cell block are negative for acid-fast bacilli and fungal organisms, respectively.  Appropriate working positive controls are used.      ANCA Panel [[id number]] Collected:  01/10/19 1920     Specimen:  Blood Updated:  01/15/19 1314     Myeloperoxidase Ab <9.0 U/mL      Antiproteinase 3 (OR-3) <3.5 U/mL      C-ANCA <1:20 titer      P-ANCA <1:20 titer      Comment: The presence of positive fluorescence exhibiting P-ANCA or C-ANCA  patterns alone is not specific for the diagnosis of Wegener's  Granulomatosis (WG) or microscopic polyangiitis. Decisions about  treatment should not be based solely on ANCA IFA results.  The  International ANCA Group Consensus recommends follow up testing of  positive sera with both OR-3 and MPO-ANCA enzyme immunoassays. As  many as 5% serum samples are positive only by EIA.  Ref. AM J Clin Pathol 1999;111:507-513.        Atypical pANCA <1:20 titer      Comment: The atypical pANCA pattern has been observed in a significant  percentage of patients with ulcerative colitis, primary sclerosing  cholangitis and autoimmune hepatitis.       Narrative:       Performed at:  [address] [place] [address]  [id number]  : Noé Bravo MD, Phone:  [phone]  Performed at:  [address] [place] [address]  [id number]  : Parth Billy PhD, Phone:  [phone]    Nuclear Antigen Antibody, IFA [[id number]]  (Abnormal) Collected:  01/10/19 1920    Specimen:  Blood Updated:  01/15/19 1116     AMBERLY Positive     Comment:                                      Negative   <1:80                                       Borderline  1:80                                       Positive   >1:80       Narrative:       Performed at:  [address] [place] [address]  [id number]  : Parth Billy PhD, Phone:  [phone]    CORINNE Staining Patterns [[id number]] Collected:  01/10/19 1920    Specimen:  Blood Updated:  01/15/19 1116     Homogeneous Pattern 1:80     Note: (Reference) Comment     Comment: A positive AMBERLY result may occur in healthy individuals (low  titer) or be associated with a variety of diseases.   See  interpretation chart which is not all inclusive:  Pattern      Antigen Detected  Suggested Disease Association  -----------  ----------------  -----------------------------  Homogeneous  DNA(ds,ss),       SLE - High titers               Nucleosomes,               Histones          Drug-induced SLE  -----------  ----------------  -----------------------------  Speckled     Sm, RNP, SCL-70,  SLE,MCTD,PSS (diffuse form),               SS-A/SS-B         Sjogrens  -----------  ----------------  -----------------------------  Nucleolar    SCL-70, PM-1/SCL  High titers Scleroderma,                                 PM/DM  -----------  ----------------  -----------------------------  Centromere   Centromere        PSS (limited form) w/Crest                                 syndrome variable  -----------  ----------------  -----------------------------  Nuclear Dot  [id number]  Primary Biliary Cirrhosis  -----------  ----------------  -----------------------------  Nuclear      ,            Primary Biliary Cirrhosis  Membrane     spring A,B,C  -----------  ----------------  -----------------------------       Narrative:       Performed at:  01 - [facility] [address]  [id number]  : Parth Billy PhD, Phone:  [phone]    IgE [[id number]]  (Abnormal) Collected:  01/10/19 1920    Specimen:  Blood Updated:  01/14/19 1611     IgE 222 IU/mL     Narrative:       Performed at:  01 - [facility] [address]  [id number]  : Noé Bravo MD, Phone:  [phone]    AMBERLY Comprehensive Plus Profile [[id number]] Collected:  01/10/19 1920    Specimen:  Blood Updated:  01/14/19 1313     Anti-DNA (DS) Ab Qn 4 IU/mL      Comment:                                    Negative      <5                                     Equivocal  5 - 9                                     Positive      >9        RNP Antibodies 0.4 AI      Caro Antibodies <0.2 AI       Smith/RNP Antibodies <0.2 AI      Antiscleroderma-70 Antibodies <0.2 AI      AKHIL SSA (RO) Ab <0.2 AI      AKHIL SSB (LA) Ab <0.2 AI      Antichromatin Antibodies 0.2 AI      Antiribosomal P Antibodies <0.2 AI      OFE-1 IgG <0.2 AI      Anti-Centromere B Antibodies <0.2 AI      See below: Comment     Comment: Autoantibody                       Disease Association  ____________________________________________________________                          Condition                  Frequency  _____________________   ________________________   _________  Antinuclear Antibody,    SLE, mixed connective  Direct (AMBERLY-D)           tissue diseases  _____________________   ________________________   _________  dsDNA                    SLE                        40 - 60%  _____________________   ________________________   _________  [place]                Drug induced SLE                90%                           SLE                        48 - 97%  _____________________   ________________________   _________  SSA (Ro)                 SLE                        25 - 35%                           Sjogren's Syndrome         40 - 70%                            Lupus                 100%  _____________________   ________________________   _________  SSB (La)                 SLE                             10%                           Sjogren's Syndrome              30%  _____________________   _______________________    _________  Sm (anti-Smith)          SLE                        15 - 30%  _____________________   _______________________    _________  RNP                      Mixed Connective Tissue                           Disease                         95%  (U1 nRNP,                SLE                        30 - 50%  anti-ribonucleoprotein)  Polymyositis and/or                           Dermatomyositis                 20%  _____________________   ________________________   _________  Scl-70 (antiDNA           Scleroderma (diffuse)      20 - 35%  topoisomerase)           Crest                           13%  _____________________   ________________________   _________  Vale-1                     Polymyositis and/or                           Dermatomyositis            20 - 40%  _____________________   ________________________   _________  Centromere B             Scleroderma - Crest                           variant                         80%  _____________________   ________________________   _________  Ribosomal P              SLE                        10 - 20%       Narrative:       Performed at:  [address]  [id number]  : Parth Billy PhD, Phone:  [phone]    CBC (No Diff) [[id number]]  (Abnormal) Collected:  01/14/19 0304    Specimen:  Blood Updated:  01/14/19 0353     WBC 9.76 10*3/mm3      RBC 3.50 10*6/mm3      Hemoglobin 9.3 g/dL      Hematocrit 28.9 %      MCV 82.6 fL      MCH 26.6 pg      MCHC 32.2 g/dL      RDW 14.6 %      RDW-SD 43.8 fl      MPV 9.8 fL      Platelets 508 10*3/mm3     Basic Metabolic Panel [[id number]]  (Abnormal) Collected:  01/14/19 0304    Specimen:  Blood Updated:  01/14/19 0345     Glucose 85 mg/dL      BUN 8 mg/dL      Creatinine 0.51 mg/dL      Sodium 142 mmol/L      Potassium 3.7 mmol/L      Chloride 101 mmol/L      CO2 32.0 mmol/L      Calcium 8.0 mg/dL      eGFR  African Amer >150 mL/min/1.73      BUN/Creatinine Ratio 15.7     Anion Gap 9.0 mmol/L     Narrative:       GFR Normal >60  Chronic Kidney Disease <60  Kidney Failure <15    Cyclic Citrul Peptide Antibody, IgG / IgA [[id number]]  (Abnormal) Collected:  01/10/19 1920    Specimen:  Blood Updated:  01/13/19 2207     CCP Antibodies IgG/IgA >250 units      Comment:                           Negative               <20                            Weak positive      20 - 39                            Moderate positive  40 - 59                            Strong positive        >59        Narrative:       Performed at:  [facility]  [id number]  : Noé Bravo MD, Phone:  [phone]    Folate [[id number]] Collected:  01/12/19 0346    Specimen:  Blood Updated:  01/13/19 1113     Folate 6.07 ng/mL     Vitamin B12 [[id number]]  (Normal) Collected:  01/12/19 0346    Specimen:  Blood Updated:  01/13/19 1113     Vitamin B-12 396 pg/mL     Ferritin [[id number]]  (Abnormal) Collected:  01/12/19 0346    Specimen:  Blood Updated:  01/13/19 1043     Ferritin 399.00 ng/mL     Iron Profile [[id number]]  (Abnormal) Collected:  01/12/19 0346    Specimen:  Blood Updated:  01/13/19 1014     Iron 30 mcg/dL      TIBC 271 mcg/dL      Iron Saturation 11 %     Comprehensive Metabolic Panel [[id number]]  (Abnormal) Collected:  01/13/19 0500    Specimen:  Blood Updated:  01/13/19 0543     Glucose 85 mg/dL      BUN 10 mg/dL      Creatinine 0.48 mg/dL      Sodium 141 mmol/L      Potassium 3.6 mmol/L      Chloride 105 mmol/L      CO2 29.0 mmol/L      Calcium 7.5 mg/dL      Total Protein 4.9 g/dL      Albumin 2.20 g/dL      ALT (SGPT) 37 U/L      AST (SGOT) 18 U/L      Alkaline Phosphatase 59 U/L      Total Bilirubin 0.3 mg/dL      eGFR  African Amer >150 mL/min/1.73      Globulin 2.7 gm/dL      A/G Ratio 0.8 g/dL      BUN/Creatinine Ratio 20.8     Anion Gap 7.0 mmol/L     CBC (No Diff) [[id number]]  (Abnormal) Collected:  01/13/19 0500    Specimen:  Blood Updated:  01/13/19 0528     WBC 12.37 10*3/mm3      RBC 2.78 10*6/mm3      Hemoglobin 7.5 g/dL      Hematocrit 22.9 %      MCV 82.4 fL      MCH 27.0 pg      MCHC 32.8 g/dL      RDW 14.6 %      RDW-SD 44.0 fl      MPV 10.1 fL      Platelets 486 10*3/mm3     Blood Culture - Blood, Hand, Right [[id number]]  (Abnormal) Collected:  01/10/19 1920    Specimen:  Blood from Hand, Right Updated:  01/12/19 1109     Blood Culture Staphylococcus, coagulase negative     Isolated from Pediatric Bottle     Gram Stain Gram positive cocci     Narrative:       No anaerobic bottle collected.   Probable Contaminant    C4 Complement [[id number]] Collected:  01/10/19 1920    Specimen:  Blood Updated:  01/12/19 0817     C4 Complement 27 mg/dL     Narrative:       Performed at:  [address] [place] [address]  [id number]  : Parth Billy PhD, Phone:  [phone]    C3 Complement [[id number]]  (Abnormal) Collected:  01/10/19 1920    Specimen:  Blood Updated:  01/12/19 0722     C3 Complement 182 mg/dL     Narrative:       Performed at:  [facility]  [id number]  : Parth Billy PhD, Phone:  [phone]    Basic Metabolic Panel [[id number]]  (Abnormal) Collected:  01/12/19 0346    Specimen:  Blood Updated:  01/12/19 0509     Glucose 124 mg/dL      BUN 11 mg/dL      Creatinine 0.55 mg/dL      Sodium 140 mmol/L      Potassium 4.3 mmol/L      Chloride 102 mmol/L      CO2 28.0 mmol/L      Calcium 7.9 mg/dL      eGFR  African Amer >150 mL/min/1.73      BUN/Creatinine Ratio 20.0     Anion Gap 10.0 mmol/L     Narrative:       GFR Normal >60  Chronic Kidney Disease <60  Kidney Failure <15    CBC (No Diff) [[id number]]  (Abnormal) Collected:  01/12/19 0346    Specimen:  Blood Updated:  01/12/19 0501     WBC 17.48 10*3/mm3      RBC 3.45 10*6/mm3      Hemoglobin 9.3 g/dL      Hematocrit 28.3 %      MCV 82.0 fL      MCH 27.0 pg      MCHC 32.9 g/dL      RDW 14.3 %      RDW-SD 42.5 fl      MPV 10.4 fL      Platelets 543 10*3/mm3     Blood Culture ID, PCR - Blood, Hand, Right [[id number]]  (Abnormal) Collected:  01/10/19 1920    Specimen:  Blood from Hand, Right Updated:  01/11/19 2309     BCID, PCR Staphylococcus spp, not aureus. Identification by BCID PCR.    Basic Metabolic Panel [[id number]]  (Abnormal) Collected:  01/11/19 1035    Specimen:  Blood Updated:  01/11/19 1057     Glucose 138 mg/dL      BUN 9 mg/dL      Creatinine 0.45 mg/dL      Sodium 140 mmol/L      Potassium 4.1 mmol/L       Chloride 103 mmol/L      CO2 27.0 mmol/L      Calcium 7.9 mg/dL      eGFR  African Amer >150 mL/min/1.73      BUN/Creatinine Ratio 20.0     Anion Gap 10.0 mmol/L     Narrative:       GFR Normal >60  Chronic Kidney Disease <60  Kidney Failure <15    CBC (No Diff) [[id number]]  (Abnormal) Collected:  01/11/19 1035    Specimen:  Blood Updated:  01/11/19 1052     WBC 10.58 10*3/mm3      RBC 3.74 10*6/mm3      Hemoglobin 10.0 g/dL      Hematocrit 30.3 %      MCV 81.0 fL      MCH 26.7 pg      MCHC 33.0 g/dL      RDW 14.2 %      RDW-SD 41.9 fl      MPV 10.6 fL      Platelets 417 10*3/mm3     HIV-1 & HIV-2 Antibodies [[id number]] Collected:  01/11/19 0448    Specimen:  Blood Updated:  01/11/19 0557    Narrative:       The following orders were created for panel order HIV-1 & HIV-2 Antibodies.  Procedure                               Abnormality         Status                     ---------                               -----------         ------                     HIV-1 & HIV-2 Antibodies[[id number]]     Normal              Final result                 Please view results for these tests on the individual orders.    HIV-1 & HIV-2 Antibodies [[id number]]  (Normal) Collected:  01/11/19 0448    Specimen:  Blood Updated:  01/11/19 0557     HIV-1/ HIV-2 Negative    Basic Metabolic Panel [[id number]]  (Abnormal) Collected:  01/11/19 0448    Specimen:  Blood Updated:  01/11/19 0512     Glucose 141 mg/dL      BUN 8 mg/dL      Creatinine 0.54 mg/dL      Sodium 140 mmol/L      Potassium 3.9 mmol/L      Chloride 103 mmol/L      CO2 28.0 mmol/L      Calcium 7.8 mg/dL      eGFR  African Amer >150 mL/min/1.73      BUN/Creatinine Ratio 14.8     Anion Gap 9.0 mmol/L     Narrative:       GFR Normal >60  Chronic Kidney Disease <60  Kidney Failure <15    CBC (No Diff) [[id number]]  (Abnormal) Collected:  01/11/19 0448    Specimen:  Blood Updated:  01/11/19 0501     WBC 9.40 10*3/mm3      RBC 2.93 10*6/mm3      Hemoglobin 7.9 g/dL      Hematocrit  23.8 %      MCV 81.2 fL      MCH 27.0 pg      MCHC 33.2 g/dL      RDW 14.3 %      RDW-SD 42.5 fl      MPV 10.4 fL      Platelets 370 10*3/mm3     Urinalysis With Culture If Indicated - Urine, Catheter [[id number]]  (Abnormal) Collected:  01/11/19 0055    Specimen:  Urine, Catheter Updated:  01/11/19 0222     Color, UA Dark Yellow     Appearance, UA Clear     pH, UA 5.5     Specific Gravity, UA 1.015     Glucose, UA Negative     Ketones, UA Negative     Bilirubin, UA Negative     Blood, UA Negative     Protein, UA Trace     Leuk Esterase, UA Trace     Nitrite, UA Negative     Urobilinogen, UA 1.0 E.U./dL    Urinalysis, Microscopic Only - Urine, Catheter [[id number]]  (Abnormal) Collected:  01/11/19 0055    Specimen:  Urine, Catheter Updated:  01/11/19 0222     RBC, UA 0-2 /HPF      WBC, UA 3-5 /HPF      Bacteria, UA 1+ /HPF      Squamous Epithelial Cells, UA 3-6 /HPF      Hyaline Casts, UA 0-2 /LPF      Methodology Manual Light Microscopy    Hepatitis Panel, Acute [[id number]]  (Normal) Collected:  01/10/19 1920    Specimen:  Blood Updated:  01/10/19 2221     HCV S/C Ratio 0.05     Hepatitis C Ab Negative     Hep A IgM Negative     Hep B C IgM Negative     Hepatitis B Surface Ag Negative    Rheumatoid Factor [[id number]]  (Abnormal) Collected:  01/10/19 1920    Specimen:  Blood Updated:  01/10/19 2101     Rheumatoid Factor Quantitative 185.1 IU/mL     TSH [[id number]]  (Normal) Collected:  01/10/19 1920    Specimen:  Blood Updated:  01/10/19 2054     TSH 1.380 mIU/mL     Procalcitonin [[id number]]  (Normal) Collected:  01/10/19 1921    Specimen:  Blood Updated:  01/10/19 2038     Procalcitonin <0.25 ng/mL     Narrative:       SIRS, sepsis, severe sepsis, and septic shock are categorized according to the criteria of the consensus conference of the American College of Chest Physicians/Society of Critical Care Medicine.    PCT < 0.5 ng/mL     Systemic infection (sepsis) is not likely.    PCT >0.5 and < 2.0 ng/mL Systemic  infection (sepsis) is possible, but other conditions are known to elevate PCT as well.    PCT > 2.0 ng/mL     Systemic infection (sepsis) is likely, unless other causes are known.      PCT > 10.0 ng/mL    Important systemic inflammatory response, almost exclusively due to severe bacterial sepsis or septic shock.    PCT values of < 0.5 ng/mL do not exclude an infection, because localized infections (without systemic signs) may be associated with such low concentrations, or a systemic infection in its initial stages (<6 hours).  Increased PCT can occur without infection.  PCT concentrations between 0.5 and 2.0 ng/mL should be interpreted taking into account the patients history.  It is recommended to retest PCT within 6-24 hours if any concentrations < 2.0 ng/mL are obtained.    C-reactive Protein [[id number]]  (Abnormal) Collected:  01/10/19 1921    Specimen:  Blood Updated:  01/10/19 2036     C-Reactive Protein 23.88 mg/dL     Troponin [[id number]]  (Normal) Collected:  01/10/19 1921    Specimen:  Blood Updated:  01/10/19 2034     Troponin I <0.012 ng/mL     Comprehensive Metabolic Panel [[id number]]  (Abnormal) Collected:  01/10/19 1921    Specimen:  Blood Updated:  01/10/19 2026     Glucose 100 mg/dL      BUN 7 mg/dL      Creatinine 0.68 mg/dL      Sodium 140 mmol/L      Potassium 3.2 mmol/L      Chloride 99 mmol/L      CO2 29.0 mmol/L      Calcium 7.7 mg/dL      Total Protein 6.6 g/dL      Albumin 3.00 g/dL      ALT (SGPT) 53 U/L      AST (SGOT) 35 U/L      Alkaline Phosphatase 83 U/L      Total Bilirubin 0.7 mg/dL      eGFR   Amer 124 mL/min/1.73      Globulin 3.6 gm/dL      A/G Ratio 0.8 g/dL      BUN/Creatinine Ratio 10.3     Anion Gap 12.0 mmol/L     CK [[id number]]  (Normal) Collected:  01/10/19 1921    Specimen:  Blood Updated:  01/10/19 2026     Creatine Kinase 58 U/L     Lactic Acid, Plasma [[id number]]  (Normal) Collected:  01/10/19 1920    Specimen:  Blood Updated:  01/10/19 2022     Lactate 1.1  mmol/L     Protime-INR [[id number]]  (Abnormal) Collected:  01/10/19 1920    Specimen:  Blood Updated:  01/10/19 2020     Protime 17.6 Seconds      INR 1.40    aPTT [[id number]]  (Abnormal) Collected:  01/10/19 1920    Specimen:  Blood Updated:  01/10/19 2020     PTT 38.8 seconds     Sedimentation Rate [[id number]]  (Abnormal) Collected:  01/10/19 1920    Specimen:  Blood Updated:  01/10/19 2019     Sed Rate 46 mm/hr     CBC Auto Differential [[id number]]  (Abnormal) Collected:  01/10/19 1920    Specimen:  Blood Updated:  01/10/19 2012     WBC 10.57 10*3/mm3      RBC 3.09 10*6/mm3      Hemoglobin 8.4 g/dL      Hematocrit 25.7 %      MCV 83.2 fL      MCH 27.2 pg      MCHC 32.7 g/dL      RDW 14.5 %      RDW-SD 43.8 fl      MPV 10.9 fL      Platelets 444 10*3/mm3      Neutrophil % 74.4 %      Lymphocyte % 10.8 %      Monocyte % 11.9 %      Eosinophil % 2.3 %      Basophil % 0.2 %      Immature Grans % 0.4 %      Neutrophils, Absolute 7.87 10*3/mm3      Lymphocytes, Absolute 1.14 10*3/mm3      Monocytes, Absolute 1.26 10*3/mm3      Eosinophils, Absolute 0.24 10*3/mm3      Basophils, Absolute 0.02 10*3/mm3      Immature Grans, Absolute 0.04 10*3/mm3      nRBC 0.0 /100 WBC         Hospital Course:  The patient originally presented on 1/10 as a transfer from [facility] in [place], [place].  Transfer was initially sought at [facility] or [facility], but both facilities were at capacity.     The patient had a recent (18) delivery of a healthy baby girl by .  She developed problems with shortness of breath and chest discomfort thereafter.  She was found to have a moderate left pleural effusion and a large pericardial effusion with evidence of tamponade physiology. After stabilization here, she underwent a subxiphoid pericardial window with Dr. Dougherty on .  Transthoracic echocardiogram repeated on  as above. Fluid studies remain unrevealing thus far.     Patient has no  "intention to breast feed.      She also has a history of rheumatoid arthritis.  Dr. Bray has seen her.  She has been placed on a prednisone taper and is also receiving colchicine.  He thinks that her issues could in fact be related to rheumatoid arthritis.  She previously took Plaquenil chronically.     She has had one blood culture come back positive for coagulase-negative Staphylococcus.  This is likely contaminant. Surveillance cultures negative.     Checked anemia substrates, which are consistent with iron deficiency. IV Venofer given on 1/16.      Lovenox was used for DVT prophylaxis.    She has been cleared for discharge by cardiothoracic surgery and rheumatology.  She will return to see them in 1 month.  She will continue on prednisone and colchicine as recommended by rheumatology.  She will see her primary care provider in one week.  She is extremely anxious to go home and be with her children and .    Physical Exam on Discharge:  /70 (BP Location: Left arm, Patient Position: Lying)   Pulse 65   Temp 98.9 °F (37.2 °C) (Temporal)   Resp 16   Ht 162.6 cm (64\")   Wt 99.6 kg (219 lb 8 oz)   SpO2 95%   BMI 37.68 kg/m²   Physical Exam  Constitutional: She is oriented to person, place, and time. She appears well-developed and well-nourished. Up in bed, eating breakfast. No distress. No family present. Discussed with her nurse, [place].   Head: Normocephalic and atraumatic.   Eyes: Conjunctivae and EOM are normal. Pupils are equal, round, and reactive to light.   Neck: Neck supple. No JVD present.   Cardiovascular: Normal rate, regular rhythm, normal heart sounds and intact distal pulses. Exam reveals no gallop and no friction rub. No murmur heard. Pericardial drains removed on 1/16.    Pulmonary/Chest: Effort normal and breath sounds normal. No respiratory distress. She has no wheezes. She has no rales. She exhibits no tenderness.   Abdominal: Soft. Bowel sounds are normal. She exhibits no " distension. There is no tenderness. There is no rebound and no guarding.   Musculoskeletal: Normal range of motion. She exhibits no edema, tenderness or deformity.   Neurological: She is alert and oriented to person, place, and time. She displays normal reflexes. No cranial nerve deficit. She exhibits normal muscle tone.   Skin: Skin is warm and dry. No rash noted.   Psychiatric: She has a normal mood and affect. Her behavior is normal. Judgment and thought content normal.     Condition on Discharge: Good.     Discharge Disposition:  Home or Self Care    Discharge Medications:     Discharge Medications      New Medications      Instructions Start Date   colchicine 0.6 MG tablet   0.6 mg, Oral, Every 12 Hours Scheduled      metoprolol tartrate 25 MG tablet  Commonly known as:  LOPRESSOR   25 mg, Oral, Every 12 Hours Scheduled      oxyCODONE-acetaminophen 5-325 MG per tablet  Commonly known as:  PERCOCET   1 tablet, Oral, Every 6 Hours PRN      predniSONE 10 MG tablet  Commonly known as:  DELTASONE   Take 1 tablet daily for 5 days, then take one half tablet daily for 5 days, then stop.         Continue These Medications      Instructions Start Date   pantoprazole 40 MG EC tablet  Commonly known as:  PROTONIX   40 mg, Oral, Daily         Stop These Medications    ibuprofen 800 MG tablet  Commonly known as:  ADVIL,MOTRIN          Discharge Diet:   Diet Instructions     Diet: Regular; Thin      Discharge Diet:  Regular    Fluid Consistency:  Thin        Activity at Discharge:   Activity Instructions     Activity as Tolerated          Follow-up Appointments:   1. Shyann Fowler NP in [place], [place] in 1 week.   2. Dr. Dougherty in 1 month with a CXR.   3. Dr. Bray in 1 month.   (We will try to coordinate both of the specialty followups being on the same day if able).     Test Results Pending at Discharge: None.     Bob Lorenzo DO  01/17/19  8:35 AM    Time: 35 minutes.

## 2019-01-17 NOTE — PAYOR COMM NOTE
"DC HOME 1/17/19    [id number]  UR  [phone]  Yakelin Guidry (29 y.o. Female)     Date of Birth Social Security Number Address Home Phone MRN    [id number]  [id number] [facility] [address] [phone] [id number]    [facility] Status          Other        Admission Date Admission Type Admitting Provider Attending Provider Department, Room/Bed    1/10/19 Urgent Bob Lorenzo DO  [facility] 4B, 435/1    Discharge Date Discharge Disposition Discharge Destination        1/17/2019 Home or Self Care              Attending Provider:  (none)   Allergies:  No Known Allergies    Isolation:  None   Infection:  None   Code Status:  CPR    Ht:  162.6 cm (64\")   Wt:  99.6 kg (219 lb 8 oz)    Admission Cmt:  None   Principal Problem:  None                Active Insurance as of 1/10/2019     Primary Coverage     Payor Plan Insurance Group Employer/Plan Group    [facility]      Payor Plan Address Payor Plan Phone Number Payor Plan Fax Number Effective Dates    [address]   1/10/2019 - None Entered    [facility] [address]       Subscriber Name Subscriber Birth Date Member ID       YAKELIN GUIDRY 1990 [id number]                 Emergency Contacts      (Rel.) Home Phone Work Phone Mobile Phone    WELLINGTON GUIDRY (Spouse) [phone] -- --               Discharge Summary      Bob Lorenzo DO at 1/17/2019  8:35 AM                [facility]  DISCHARGE SUMMARY       Date of Admission: 1/10/2019  Date of Discharge:  1/17/2019  Primary Care Physician: Shyann Fowler NP in [place], [place].     Presenting Problem/History of Present Illness:  Shortness of breath, chest pain.     Final Discharge Diagnoses:  Patient Active Problem List   Diagnosis   • Pleural effusion   • Pericarditis   • Rheumatoid arthritis (CMS/HCC)   • Sickle cell trait (CMS/HCC)   • Acute " pericarditis associated with rheumatoid arthritis (CMS/HCC)   • Microcytic anemia     Consults:   1. Dr. Dougherty with CTS.   2. Dr. Bray with rheumatology.     Procedures Performed: Pericardial window and drain placement by Dr. Dougherty on 1/11/19. Remaining drains removed on 1/16/19.     Pertinent Test Results:   Results for orders placed during the hospital encounter of 01/10/19   Adult Transthoracic Echo Complete W/ Cont if Necessary Per Protocol    Narrative · Left ventricular systolic function is normal. Estimated EF appears to be   in the range of 56 - 60%.  · Normal right ventricular cavity size and systolic function noted.  · Trace mitral valve regurgitation is present.  · Trace to mild tricuspid valve regurgitation is present. Estimated right   ventricular systolic pressure from tricuspid regurgitation is normal (<35   mmHg).  · There is a trivial pericardial effusion. There is no evidence of cardiac   tamponade.        Imaging Results (last 7 days)     Procedure Component Value Units Date/Time    XR Chest 1 View [[id number]] Collected:  01/17/19 0720     Updated:  01/17/19 0724    Narrative:       EXAMINATION:   XR CHEST 1 -  1/17/2019 7:20 AM CST     HISTORY: Cardiac tamponade not     Frontal upright radiograph of the chest 1/17/2019 3:50 AM CST     COMPARISON: January 16, 2019.     FINDINGS:   The lungs are clear. Cardiac silhouettes mildly enlarged. Pericardial  catheters have been removed compared to prior exam.      The osseous structures and surrounding soft tissues demonstrate no acute  abnormality.       Impression:       1. Cardiomegaly no evidence pulmonary vascular congestion.        This report was finalized on 01/17/2019 07:21 by Dr. Honorio Garay MD.    XR Chest 1 View [[id number]] Collected:  01/16/19 0700     Updated:  01/16/19 0704    Narrative:       EXAMINATION:   XR CHEST 1 -  1/16/2019 7:00 AM CST     HISTORY: Pericardial effusion     Frontal upright radiograph of the chest 1/16/2019  4:10 AM CST     COMPARISON: January 15, 2019.     FINDINGS:   The right lung is clear. Atelectasis left lower lobe is noted.. The  cardiac silhouette is mildly enlarged. Pericardial drains are noted.      The osseous structures and surrounding soft tissues demonstrate no acute  abnormality.       Impression:       1. Stable chest. Atelectasis left lower lobes again noted.  2. Pericardial drains are stable..        This report was finalized on 01/16/2019 07:01 by Dr. Honorio Garay MD.    XR Chest 1 View [[id number]] Collected:  01/15/19 0659     Updated:  01/15/19 0703    Narrative:       EXAMINATION:   XR CHEST 1 -  1/15/2019 6:59 AM CST     HISTORY: Cardiac tamponade benign     Frontal upright radiograph of the chest 1/15/2019 4:10 AM CST     COMPARISON: January 14, 2018.     FINDINGS:   Right lung is clear. Atelectasis left lung is again noted. Pericardial  drains are present stable and unchanged.. Cardiac silhouettes mildly  enlarged this is unchanged.      The osseous structures and surrounding soft tissues demonstrate no acute  abnormality.       Impression:       1. Stable chest with pericardial drains.  2. Atelectasis left lung.        This report was finalized on 01/15/2019 07:00 by Dr. Honorio Garay MD.    XR Chest 1 View [[id number]] Collected:  01/14/19 0711     Updated:  01/14/19 0716    Narrative:       EXAMINATION:   XR CHEST 1 -  1/14/2019 7:11 AM CST     HISTORY: Pericardial drain     Frontal upright radiograph of the chest 1/14/2019 4:36 AM CST     COMPARISON: January 13, 2019.     FINDINGS:   Right lung is clear. Atelectasis left lower lung is again noted. The  cardiac silhouettes enlarged.     Pericardial drains are noted     The osseous structures and surrounding soft tissues demonstrate no acute  abnormality.       Impression:       1. Atelectasis left lung base unchanged from January 13  2. Pericardial drains are noted.        This report was finalized on 01/14/2019 07:12 by Dr. Olmedo  MD Tanisha.    XR Chest 1 View [[id number]] Collected:  01/13/19 0859     Updated:  01/13/19 0904    Narrative:       History:  29-year-old with tamponade     Reference:  Chest radiograph one day prior.     Findings:  Frontal chest radiograph performed.     Stable enlargement of the cardiac/pericardial silhouette. Left basilar  opacities are unchanged, possibly combined pleural fluid and  atelectasis. There is a small right pleural effusion now seen. Mild  right basilar atelectasis.     Pericardial drains appear unchanged.          Impression:          1.. Unchanged left basilar opacities as above.  2. Suspected developing small right pleural effusion and right basilar  atelectasis.  This report was finalized on 01/13/2019 09:01 by Dr Curt iGl, .    XR Chest 1 View [[id number]] Collected:  01/12/19 0804     Updated:  01/12/19 0810    Narrative:       History:  29-year-old with tamponade     Reference:  Chest radiograph one day prior.     Findings:  Frontal chest radiograph performed.     Enlarged cardiac/pericardial silhouette, grossly stable. Pericardial  drains are stable. Opacities at the lingula and left base may be  combined pleural fluid and atelectasis. Slight improved aeration of the  left upper lobe. Right lung is grossly clear. No large pleural effusion  or pneumothorax.          Impression:       Stable pericardial drains. No overt change from yesterday.  This report was finalized on 01/12/2019 08:07 by Dr Curt Gil, .    XR Chest 1 View [[id number]] Collected:  01/11/19 1040     Updated:  01/11/19 1058    Narrative:       EXAMINATION:   XR CHEST 1 VW-  1/11/2019 10:40 AM CST     HISTORY: Paracardial drain     Frontal upright radiograph of the chest 1/11/2019 10:28 AM CST     COMPARISON: January 10, 2019.     FINDINGS:   The right lung is clear. A moderate left pleural effusion is present..  The cardiac silhouettes moderately enlarged.     PeriCardial drains are present..      The osseous structures and  surrounding soft tissues demonstrate no acute  abnormality.       Impression:       1. 2 drains are noted. These are pericardial drains     Moderate left pleural effusion.        This report was finalized on 01/11/2019 10:55 by Dr. Honorio Garay MD.    XR Chest 1 View [[id number]] Collected:  01/10/19 2132     Updated:  01/10/19 2136    Narrative:       EXAMINATION:  XR CHEST 1 VW-  1/10/2019 9:27 PM CST     HISTORY: I31.4-Cardiac tamponade; H88-Iwiyxcc effusion, not elsewhere  classified; I30.9-Acute pericarditis, unspecified; M06.9-Rheumatoid  arthritis, unspecified; M06.9-Rheumatoid arthritis, unspecified     COMPARISON: No comparison study.     FINDINGS:  There is very poor inspiration. There is dense infiltrate at  the left lung base. The left hemidiaphragm is obscured. There is patchy  infiltrate at the right lung base. There is at least moderate  cardiomegaly. The left heart border is obscured by the infiltrate on the  left.       Impression:       1. Poor inspiration.  2. Dense infiltrate on the left. The left hemidiaphragm is obscured. The  left heart border is obscured. This may represent pneumonia and/or  pleural effusion.  3. Mild patchy infiltrate at the right base.  4. Heart size is difficult to evaluate due to the infiltrate on the  left. I suspect there is at least moderate cardiomegaly.        This report was finalized on 01/10/2019 21:33 by Dr. José Miguel Busby MD.        Lab Results (last 7 days)     Procedure Component Value Units Date/Time    Body Fluid Culture - Body Fluid, Pericardium [[id number]] Collected:  01/11/19 0832    Specimen:  Body Fluid from Pericardium Updated:  01/17/19 0833     BF Culture No growth at 6 days     Gram Stain Many (4+) WBCs seen      No organisms seen    Tissue / Bone Culture - Tissue, Pericardium [[id number]] Collected:  01/11/19 0839    Specimen:  Tissue from Pericardium Updated:  01/17/19 0832     Tissue Culture No growth at 6 days     Gram Stain Moderate (3+) WBCs  seen      No organisms seen    Wound Culture - Wound, Pericardium [[id number]] Collected:  01/11/19 0832    Specimen:  Wound from Pericardium Updated:  01/17/19 0832     Wound Culture No growth at 5 days     Gram Stain Few (2+) WBCs seen      No organisms seen    Blood Culture With MAUDE - Blood, Hand, Right [[id number]] Collected:  01/13/19 0501    Specimen:  Blood from Hand, Right Updated:  01/17/19 0530     Blood Culture No growth at 4 days    Blood Culture With MAUDE - Blood, Arm, Left [[id number]] Collected:  01/13/19 0501    Specimen:  Blood from Arm, Left Updated:  01/17/19 0530     Blood Culture No growth at 4 days    Basic Metabolic Panel [[id number]]  (Abnormal) Collected:  01/17/19 0252    Specimen:  Blood Updated:  01/17/19 0355     Glucose 97 mg/dL      BUN 11 mg/dL      Creatinine 0.49 mg/dL      Sodium 139 mmol/L      Potassium 3.6 mmol/L      Chloride 97 mmol/L      CO2 34.0 mmol/L      Calcium 7.9 mg/dL      eGFR  African Amer >150 mL/min/1.73      BUN/Creatinine Ratio 22.4     Anion Gap 8.0 mmol/L     Narrative:       GFR Normal >60  Chronic Kidney Disease <60  Kidney Failure <15    CBC (No Diff) [[id number]]  (Abnormal) Collected:  01/17/19 0252    Specimen:  Blood Updated:  01/17/19 0337     WBC 9.15 10*3/mm3      RBC 3.27 10*6/mm3      Hemoglobin 8.8 g/dL      Hematocrit 27.3 %      MCV 83.5 fL      MCH 26.9 pg      MCHC 32.2 g/dL      RDW 15.1 %      RDW-SD 46.0 fl      MPV 9.7 fL      Platelets 455 10*3/mm3     Anaerobic Culture - Tissue, Pericardium [[id number]] Collected:  01/11/19 0839    Specimen:  Tissue from Pericardium Updated:  01/16/19 1218     Culture No anaerobes isolated at 5 days    Anaerobic Culture - Wound, Pericardium [[id number]] Collected:  01/11/19 0832    Specimen:  Wound from Pericardium Updated:  01/16/19 1218     Culture No anaerobes isolated at 5 days    Anaerobic Culture - Body Fluid, Pericardium [[id number]] Collected:  01/11/19 0832    Specimen:  Body Fluid from Pericardium  Updated:  01/16/19 1217     Culture No anaerobes isolated at 5 days    Fungus Culture - Body Fluid, Pericardium [[id number]] Collected:  01/11/19 0832    Specimen:  Body Fluid from Pericardium Updated:  01/16/19 0947     Fungus Culture No fungus isolated at less than 1 week    AFB Culture - Body Fluid, Pericardium [[id number]] Collected:  01/11/19 0832    Specimen:  Body Fluid from Pericardium Updated:  01/16/19 0947     AFB Culture No AFB isolated at less than 1 week     AFB Stain No acid fast bacilli seen on direct smear      No acid fast bacilli seen on concentrated smear    Fungus Culture - Tissue, Pericardium [[id number]] Collected:  01/11/19 0839    Specimen:  Tissue from Pericardium Updated:  01/16/19 0900     Fungus Culture No fungus isolated at less than 1 week    AFB Culture - Tissue, Pericardium [[id number]] Collected:  01/11/19 0839    Specimen:  Tissue from Pericardium Updated:  01/16/19 0900     AFB Culture No AFB isolated at less than 1 week     AFB Stain No acid fast bacilli seen on direct smear      No acid fast bacilli seen on concentrated smear    QuantiFERON TB Plus Client Incubated [[id number]]  (Abnormal) Collected:  01/11/19 0448    Specimen:  Blood Updated:  01/16/19 0731     QuantiFERON Criteria Comment     Comment: The QuantiFERON-TB Gold Plus result is determined by subtracting  the Nil value from either TB antigen (Ag) tube. The mitogen tube  serves as a control for the test.        QUANTIFERON TB1 AG VALUE 0.03 IU/mL      QUANTIFERON TB2 AG VALUE 0.03 IU/mL      QuantiFERON Nil Value 0.03 IU/mL      QuantiFERON Mitogen Value 0.04 IU/mL      QUANTIFERON-TB GOLD PLUS Indeterminate     Comment: Mitogen (positive control) gave low response.  This may indicate anergy or immune suppression. Early draws and  extended transit time may also result in low positive control and  indeterminate results.  The specimen received for QuantiFERON testing was incubated by the  ordering institution.  Specific  procedures outlined in our Directory  of Services and in the package insert for the QuantiFERON Gold  (In Tube) test must be followed to enable for proper stimulation of  cells for the production of interferon gamma.       Narrative:       Performed at:  01 - [address]  [id number]  : Parth Billy PhD, Phone:  [phone]    Blood Culture - Blood, Arm, Left [[id number]] Collected:  01/10/19 1920    Specimen:  Blood from Arm, Left Updated:  01/15/19 2015     Blood Culture No growth at 5 days    Tissue Pathology Exam [[id number]] Collected:  01/11/19 0843    Specimen:  Tissue from Pericardium, Tissue from Pericardium, Tissue from Pericardium Updated:  01/15/19 1358     Case Report --     Surgical Pathology Report                         Case: [id number]                                  Authorizing Provider:  Marcelino Dougherty MD      Collected:           01/11/2019 08:43 AM          Ordering Location:     [facility] OR  Received:            01/11/2019 09:03 AM          Pathologist:           Audelia Mendez MD                                                        Specimens:   1) - Pericardium, PERIPERICARDIAL LYMPH NODE #1                                                     2) - Pericardium, PERICARDIAL BIOPSY                                                                3) - Pericardium, PERIPERICARDIAL LYMPH NODE #2                                             Final Diagnosis --     1.Peripericardial lymph nodes, excisional biopsy:  A.  Lymph nodes (2) demonstrating changes of acute lymphadenitis, mild, and reactive lymphoid hyperplasia.  B.  No abscesses or granulomata identified.  C.  No histologic evidence of primary or metastatic malignancy.    2.  Pericardium, biopsy:  A.  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  B.  No histologic evidence of malignancy.  C.  Kinyoun and GMS stains are negative for acid-fast bacilli and fungal  "organisms, respectively.    3. Peripericardial lymph nodes, excisional biopsy:  A.  Lymph nodes (3) demonstrating changes of acute lymphadenitis, mild, and reactive lymphoid hyperplasia.  B.  No abscesses or granulomata identified.  C.  No histologic evidence of primary or metastatic malignancy.       Gross Description --     Specimen #1 is received in a formalin filled container, labeled with the patient's name, date of birth, and \"pericardial lymph node #1\".  The specimen consists of a yellow-pink soft tissue rubbery tissue fragment measuring 1.5 x 1.3 x 0.5 cm.  The external surface has a slightly lobulated appearance.  The cut surface is yellow-pink and rubbery.  The specimen is bisected and totally submitted in block 1A.    Specimen #2 is received fresh labeled with the patient's name, date of birth, and \"pericardial biopsy\".  The OR is contacted and IVÁN Farias, confirms no frozen section or cultures are requested on specimen.  The specimen consists of a thickened flap of rubbery tissue measuring 2.3 x 1.6 cm and measuring 0.6 cm in thickness.  The external surface appears slightly roughened with cautery artifact.  One side of the flap is inked blue, the opposite side is inked black.  The cut surface is dark red-brown and appears slightly hemorrhagic.  The specimen is totally submitted in blocks 2A and 2B.    Specimen #3 is received in a formalin filled container, labeled with the patient's name, date of birth, and \"pericardial lymph node #2\".  The specimen consists of a yellow-pink soft and rubbery tissue fragment measuring 1.5 x 0.9 x 0.5 cm.  The external surface has adherent fatty tissue and is otherwise unremarkable. The cut surface is marked by at least 3 tan-pink lymph node candidates measuring up to 0.7 cm in greatest dimension.  The cut surfaces appear tan-pink and uniform.  The specimen is bisected and is totally submitted in block 3A.            Microscopic Description --     1.  Step sections of " the peripericardial lymph nodes #1 reveal 2 lymph nodes demonstrating changes of reactive lymphoid hyperplasia.  Lymphoid follicles with germinal centers containing tingible body macro phages are seen.  The lymph node sinuses contain histiocytes and scattered PMN leukocytes consistent with mild acute lymphadenitis.  Abscesses are not seen and granulomata are not identified.  Ramón-Dimitrios cells are not seen.  There is no histologic evidence of primary or metastatic malignancy.    2.  Step sections of the pericardial biopsy reveal acute and chronic fibrinous pericarditis with hemorrhage.  The pleura has a thickened, fibrotic appearance.  The pleura is infiltrated by PMN leukocytes primarily although lymphocytes and plasma cells are also seen.  Granulomata are not identified.  Hemorrhage is seen and fibrin and red blood cells are seen at the surface.  There is no histologic evidence of malignancy.  Kinyoun  and GMS stains are performed on tissue from blocks 2A and 2B.  These stains are negative for acid-fast bacilli and fungal organisms, respectively.  Appropriate working positive controls are used.    3.Step sections of the peripericardial lymph nodes #2 reveal 3 lymph nodes demonstrating changes of reactive hyperplasia.  Lymphoid follicles with germinal centers are seen containing tingible body macro phages.  The lymph node sinuses contain histiocytes and PMN leukocytes.  Abscesses or granulomata are not identified.  Ramón-Dimitrios cells are not seen.  There is no histologic evidence of primary or metastatic malignancy.      Non-gynecologic Cytology [[id number]] Collected:  01/11/19 0857    Specimen:  Body Fluid from Pericardium Updated:  01/15/19 1351     Case Report --     Non-gynecologic Cytology                          Case: [id number]                                  Authorizing Provider:  Marcelino Dougherty MD      Collected:           01/11/2019 08:57 AM          Ordering Location:     [facility]  OR  Received:            2019 09:46 AM          Pathologist:           Audelia Mendez MD                                                        Specimen:    Pericardium, PERICARDIAL FLUID                                                              Final Diagnosis --     Pericardial fluid, ThinPrep preparation (1) and cell block:  A.  Marked acute inflammation.  B.  Abundant fibrin.  C.  Scattered reactive mesothelial cells.  D.  Negative for malignant cells.  E.  Kinyoun and GMS stains are negative for acid-fast bacilli and fungal organisms, respectively.       Clinical Information --     NOCARDIA AND ACTINOBACTER       Gross Description --     Received fresh in the laboratory in a container labeled with patient name and  designated as pericardial fluid.  500 mls of bloody fluid.  1 thin prep slide and 1 cell block prepared.         Microscopic Description --     ThinPrep preparation and sections of the cell block of pericardial fluid reveal marked acute inflammation.  Sheets of PMN leukocytes are seen in the cell block.  Reactive mesothelial cells are seen scattered throughout the background.  The cell block contains abundant fibrin.  Malignant cells are not identified.  Kinyoun and GMS stains performed on the cell block are negative for acid-fast bacilli and fungal organisms, respectively.  Appropriate working positive controls are used.      ANCA Panel [[id number]] Collected:  01/10/19 1920    Specimen:  Blood Updated:  01/15/19 1314     Myeloperoxidase Ab <9.0 U/mL      Antiproteinase 3 (AR-3) <3.5 U/mL      C-ANCA <1:20 titer      P-ANCA <1:20 titer      Comment: The presence of positive fluorescence exhibiting P-ANCA or C-ANCA  patterns alone is not specific for the diagnosis of Wegener's  Granulomatosis (WG) or microscopic polyangiitis. Decisions about  treatment should not be based solely on ANCA IFA results.  The  International ANCA Group Consensus recommends follow up testing of  positive sera  with both AZ-3 and MPO-ANCA enzyme immunoassays. As  many as 5% serum samples are positive only by EIA.  Ref. AM J Clin Pathol 1999;111:507-513.        Atypical pANCA <1:20 titer      Comment: The atypical pANCA pattern has been observed in a significant  percentage of patients with ulcerative colitis, primary sclerosing  cholangitis and autoimmune hepatitis.       Narrative:       Performed at:  01 [address]  [id number]  : Noé Bravo MD, Phone:  [phone]  Performed at:  02 - [address]  [id number]  : Parth Billy PhD, Phone:  [phone]    Nuclear Antigen Antibody, IFA [[id number]]  (Abnormal) Collected:  01/10/19 1920    Specimen:  Blood Updated:  01/15/19 1116     AMBRELY Positive     Comment:                                      Negative   <1:80                                       Borderline  1:80                                       Positive   >1:80       Narrative:       Performed at:  01 [address]  [id number]  : Parth Billy PhD, Phone:  [phone]    CORINNE Staining Patterns [[id number]] Collected:  01/10/19 1920    Specimen:  Blood Updated:  01/15/19 1116     Homogeneous Pattern 1:80     Note: (Reference) Comment     Comment: A positive AMBERLY result may occur in healthy individuals (low  titer) or be associated with a variety of diseases.  See  interpretation chart which is not all inclusive:  Pattern      Antigen Detected  Suggested Disease Association  -----------  ----------------  -----------------------------  Homogeneous  DNA(ds,ss),       SLE - High titers               Nucleosomes,               Histones          Drug-induced SLE  -----------  ----------------  -----------------------------  Speckled     Sm, RNP, SCL-70,  SLE,MCTD,PSS (diffuse form),               SS-A/SS-B         Sjogrens  -----------  ----------------   -----------------------------  Nucleolar    SCL-70, PM-1/SCL  High titers Scleroderma,                                 PM/DM  -----------  ----------------  -----------------------------  Centromere   Centromere        PSS (limited form) w/Crest                                 syndrome variable  -----------  ----------------  -----------------------------  Nuclear Dot  [id number]  Primary Biliary Cirrhosis  -----------  ----------------  -----------------------------  Nuclear      ,            Primary Biliary Cirrhosis  Membrane     spring A,B,C  -----------  ----------------  -----------------------------       Narrative:       Performed at:  [address]  [address]  [id number]  : Parth Billy PhD, Phone:  [phone]    IgE [[id number]]  (Abnormal) Collected:  01/10/19 1920    Specimen:  Blood Updated:  01/14/19 1611     IgE 222 IU/mL     Narrative:       Performed at:  [facility]  [id number]  : Noé Bravo MD, Phone:  [phone]    AMBERLY Comprehensive Plus Profile [[id number]] Collected:  01/10/19 1920    Specimen:  Blood Updated:  01/14/19 1313     Anti-DNA (DS) Ab Qn 4 IU/mL      Comment:                                    Negative      <5                                     Equivocal  5 - 9                                     Positive      >9        RNP Antibodies 0.4 AI      Caro Antibodies <0.2 AI      Caro/RNP Antibodies <0.2 AI      Antiscleroderma-70 Antibodies <0.2 AI      AKHIL SSA (RO) Ab <0.2 AI      AKHIL SSB (LA) Ab <0.2 AI      Antichromatin Antibodies 0.2 AI      Antiribosomal P Antibodies <0.2 AI      OFE-1 IgG <0.2 AI      Anti-Centromere B Antibodies <0.2 AI      See below: Comment     Comment: Autoantibody                       Disease Association  ____________________________________________________________                          Condition                   Frequency  _____________________   ________________________   _________  Antinuclear Antibody,    SLE, mixed connective  Direct (AMBERLY-D)           tissue diseases  _____________________   ________________________   _________  dsDNA                    SLE                        40 - 60%  _____________________   ________________________   _________  Chromatin                Drug induced SLE                90%                           SLE                        48 - 97%  _____________________   ________________________   _________  SSA (Ro)                 SLE                        25 - 35%                           Sjogren's Syndrome         40 - 70%                            Lupus                 100%  _____________________   ________________________   _________  SSB (La)                 SLE                             10%                           Sjogren's Syndrome              30%  _____________________   _______________________    _________  Sm (anti-Smith)          SLE                        15 - 30%  _____________________   _______________________    _________  RNP                      Mixed Connective Tissue                           Disease                         95%  (U1 nRNP,                SLE                        30 - 50%  anti-ribonucleoprotein)  Polymyositis and/or                           Dermatomyositis                 20%  _____________________   ________________________   _________  Scl-70 (antiDNA          Scleroderma (diffuse)      20 - 35%  topoisomerase)           Crest                           13%  _____________________   ________________________   _________  Vale-1                     Polymyositis and/or                           Dermatomyositis            20 - 40%  _____________________   ________________________   _________  Centromere B             Scleroderma - Crest                           variant                         80%  _____________________   ________________________    _________  Ribosomal P              SLE                        10 - 20%       Narrative:       Performed at:  [address]  [address]  [id number]  : Parth Billy PhD, Phone:  [phone]    CBC (No Diff) [[id number]]  (Abnormal) Collected:  01/14/19 0304    Specimen:  Blood Updated:  01/14/19 0353     WBC 9.76 10*3/mm3      RBC 3.50 10*6/mm3      Hemoglobin 9.3 g/dL      Hematocrit 28.9 %      MCV 82.6 fL      MCH 26.6 pg      MCHC 32.2 g/dL      RDW 14.6 %      RDW-SD 43.8 fl      MPV 9.8 fL      Platelets 508 10*3/mm3     Basic Metabolic Panel [[id number]]  (Abnormal) Collected:  01/14/19 0304    Specimen:  Blood Updated:  01/14/19 0345     Glucose 85 mg/dL      BUN 8 mg/dL      Creatinine 0.51 mg/dL      Sodium 142 mmol/L      Potassium 3.7 mmol/L      Chloride 101 mmol/L      CO2 32.0 mmol/L      Calcium 8.0 mg/dL      eGFR  African Amer >150 mL/min/1.73      BUN/Creatinine Ratio 15.7     Anion Gap 9.0 mmol/L     Narrative:       GFR Normal >60  Chronic Kidney Disease <60  Kidney Failure <15    Cyclic Citrul Peptide Antibody, IgG / IgA [[id number]]  (Abnormal) Collected:  01/10/19 1920    Specimen:  Blood Updated:  01/13/19 2205     CCP Antibodies IgG/IgA >250 units      Comment:                           Negative               <20                            Weak positive      20 - 39                            Moderate positive  40 - 59                            Strong positive        >59       Narrative:       Performed at:  [address]  [facility]  [id number]  : Noé Bravo MD, Phone:  [phone]    Folate [[id number]] Collected:  01/12/19 0346    Specimen:  Blood Updated:  01/13/19 1113     Folate 6.07 ng/mL     Vitamin B12 [[id number]]  (Normal) Collected:  01/12/19 0346    Specimen:  Blood Updated:  01/13/19 1113     Vitamin B-12 396 pg/mL     Ferritin [[id number]]  (Abnormal) Collected:  01/12/19 0346    Specimen:  Blood  Updated:  01/13/19 1043     Ferritin 399.00 ng/mL     Iron Profile [[id number]]  (Abnormal) Collected:  01/12/19 0346    Specimen:  Blood Updated:  01/13/19 1014     Iron 30 mcg/dL      TIBC 271 mcg/dL      Iron Saturation 11 %     Comprehensive Metabolic Panel [[id number]]  (Abnormal) Collected:  01/13/19 0500    Specimen:  Blood Updated:  01/13/19 0543     Glucose 85 mg/dL      BUN 10 mg/dL      Creatinine 0.48 mg/dL      Sodium 141 mmol/L      Potassium 3.6 mmol/L      Chloride 105 mmol/L      CO2 29.0 mmol/L      Calcium 7.5 mg/dL      Total Protein 4.9 g/dL      Albumin 2.20 g/dL      ALT (SGPT) 37 U/L      AST (SGOT) 18 U/L      Alkaline Phosphatase 59 U/L      Total Bilirubin 0.3 mg/dL      eGFR  African Amer >150 mL/min/1.73      Globulin 2.7 gm/dL      A/G Ratio 0.8 g/dL      BUN/Creatinine Ratio 20.8     Anion Gap 7.0 mmol/L     CBC (No Diff) [[id number]]  (Abnormal) Collected:  01/13/19 0500    Specimen:  Blood Updated:  01/13/19 0528     WBC 12.37 10*3/mm3      RBC 2.78 10*6/mm3      Hemoglobin 7.5 g/dL      Hematocrit 22.9 %      MCV 82.4 fL      MCH 27.0 pg      MCHC 32.8 g/dL      RDW 14.6 %      RDW-SD 44.0 fl      MPV 10.1 fL      Platelets 486 10*3/mm3     Blood Culture - Blood, Hand, Right [[id number]]  (Abnormal) Collected:  01/10/19 1920    Specimen:  Blood from Hand, Right Updated:  01/12/19 1109     Blood Culture Staphylococcus, coagulase negative     Isolated from Pediatric Bottle     Gram Stain Gram positive cocci    Narrative:       No anaerobic bottle collected.   Probable Contaminant    C4 Complement [[id number]] Collected:  01/10/19 1920    Specimen:  Blood Updated:  01/12/19 0817     C4 Complement 27 mg/dL     Narrative:       Performed at:  [facility]  [id number]  : Parth Billy PhD, Phone:  [phone]    C3 Complement [[id number]]  (Abnormal) Collected:  01/10/19 1920    Specimen:  Blood Updated:  01/12/19 0722     C3 Complement 182  mg/dL     Narrative:       Performed at:  [facility]  [id number]  : Parth Billy PhD, Phone:  [phone]    Basic Metabolic Panel [[id number]]  (Abnormal) Collected:  01/12/19 0346    Specimen:  Blood Updated:  01/12/19 0509     Glucose 124 mg/dL      BUN 11 mg/dL      Creatinine 0.55 mg/dL      Sodium 140 mmol/L      Potassium 4.3 mmol/L      Chloride 102 mmol/L      CO2 28.0 mmol/L      Calcium 7.9 mg/dL      eGFR  African Amer >150 mL/min/1.73      BUN/Creatinine Ratio 20.0     Anion Gap 10.0 mmol/L     Narrative:       GFR Normal >60  Chronic Kidney Disease <60  Kidney Failure <15    CBC (No Diff) [[id number]]  (Abnormal) Collected:  01/12/19 0346    Specimen:  Blood Updated:  01/12/19 0501     WBC 17.48 10*3/mm3      RBC 3.45 10*6/mm3      Hemoglobin 9.3 g/dL      Hematocrit 28.3 %      MCV 82.0 fL      MCH 27.0 pg      MCHC 32.9 g/dL      RDW 14.3 %      RDW-SD 42.5 fl      MPV 10.4 fL      Platelets 543 10*3/mm3     Blood Culture ID, PCR - Blood, Hand, Right [[id number]]  (Abnormal) Collected:  01/10/19 1920    Specimen:  Blood from Hand, Right Updated:  01/11/19 2309     BCID, PCR Staphylococcus spp, not aureus. Identification by BCID PCR.    Basic Metabolic Panel [[id number]]  (Abnormal) Collected:  01/11/19 1035    Specimen:  Blood Updated:  01/11/19 1057     Glucose 138 mg/dL      BUN 9 mg/dL      Creatinine 0.45 mg/dL      Sodium 140 mmol/L      Potassium 4.1 mmol/L      Chloride 103 mmol/L      CO2 27.0 mmol/L      Calcium 7.9 mg/dL      eGFR  African Amer >150 mL/min/1.73      BUN/Creatinine Ratio 20.0     Anion Gap 10.0 mmol/L     Narrative:       GFR Normal >60  Chronic Kidney Disease <60  Kidney Failure <15    CBC (No Diff) [[id number]]  (Abnormal) Collected:  01/11/19 1035    Specimen:  Blood Updated:  01/11/19 1052     WBC 10.58 10*3/mm3      RBC 3.74 10*6/mm3      Hemoglobin 10.0 g/dL      Hematocrit 30.3 %      MCV 81.0 fL      MCH 26.7 pg       MCHC 33.0 g/dL      RDW 14.2 %      RDW-SD 41.9 fl      MPV 10.6 fL      Platelets 417 10*3/mm3     HIV-1 & HIV-2 Antibodies [[id number]] Collected:  01/11/19 0448    Specimen:  Blood Updated:  01/11/19 0557    Narrative:       The following orders were created for panel order HIV-1 & HIV-2 Antibodies.  Procedure                               Abnormality         Status                     ---------                               -----------         ------                     HIV-1 & HIV-2 Antibodies[[id number]]     Normal              Final result                 Please view results for these tests on the individual orders.    HIV-1 & HIV-2 Antibodies [[id number]]  (Normal) Collected:  01/11/19 0448    Specimen:  Blood Updated:  01/11/19 0557     HIV-1/ HIV-2 Negative    Basic Metabolic Panel [[id number]]  (Abnormal) Collected:  01/11/19 0448    Specimen:  Blood Updated:  01/11/19 0512     Glucose 141 mg/dL      BUN 8 mg/dL      Creatinine 0.54 mg/dL      Sodium 140 mmol/L      Potassium 3.9 mmol/L      Chloride 103 mmol/L      CO2 28.0 mmol/L      Calcium 7.8 mg/dL      eGFR  African Amer >150 mL/min/1.73      BUN/Creatinine Ratio 14.8     Anion Gap 9.0 mmol/L     Narrative:       GFR Normal >60  Chronic Kidney Disease <60  Kidney Failure <15    CBC (No Diff) [[id number]]  (Abnormal) Collected:  01/11/19 0448    Specimen:  Blood Updated:  01/11/19 0501     WBC 9.40 10*3/mm3      RBC 2.93 10*6/mm3      Hemoglobin 7.9 g/dL      Hematocrit 23.8 %      MCV 81.2 fL      MCH 27.0 pg      MCHC 33.2 g/dL      RDW 14.3 %      RDW-SD 42.5 fl      MPV 10.4 fL      Platelets 370 10*3/mm3     Urinalysis With Culture If Indicated - Urine, Catheter [[id number]]  (Abnormal) Collected:  01/11/19 0055    Specimen:  Urine, Catheter Updated:  01/11/19 0222     Color, UA Dark Yellow     Appearance, UA Clear     pH, UA 5.5     Specific Gravity, UA 1.015     Glucose, UA Negative     Ketones, UA Negative     Bilirubin, UA Negative     Blood, UA  Negative     Protein, UA Trace     Leuk Esterase, UA Trace     Nitrite, UA Negative     Urobilinogen, UA 1.0 E.U./dL    Urinalysis, Microscopic Only - Urine, Catheter [[id number]]  (Abnormal) Collected:  01/11/19 0055    Specimen:  Urine, Catheter Updated:  01/11/19 0222     RBC, UA 0-2 /HPF      WBC, UA 3-5 /HPF      Bacteria, UA 1+ /HPF      Squamous Epithelial Cells, UA 3-6 /HPF      Hyaline Casts, UA 0-2 /LPF      Methodology Manual Light Microscopy    Hepatitis Panel, Acute [[id number]]  (Normal) Collected:  01/10/19 1920    Specimen:  Blood Updated:  01/10/19 2221     HCV S/C Ratio 0.05     Hepatitis C Ab Negative     Hep A IgM Negative     Hep B C IgM Negative     Hepatitis B Surface Ag Negative    Rheumatoid Factor [[id number]]  (Abnormal) Collected:  01/10/19 1920    Specimen:  Blood Updated:  01/10/19 2101     Rheumatoid Factor Quantitative 185.1 IU/mL     TSH [[id number]]  (Normal) Collected:  01/10/19 1920    Specimen:  Blood Updated:  01/10/19 2054     TSH 1.380 mIU/mL     Procalcitonin [[id number]]  (Normal) Collected:  01/10/19 1921    Specimen:  Blood Updated:  01/10/19 2038     Procalcitonin <0.25 ng/mL     Narrative:       SIRS, sepsis, severe sepsis, and septic shock are categorized according to the criteria of the consensus conference of the American College of Chest Physicians/Society of Critical Care Medicine.    PCT < 0.5 ng/mL     Systemic infection (sepsis) is not likely.    PCT >0.5 and < 2.0 ng/mL Systemic infection (sepsis) is possible, but other conditions are known to elevate PCT as well.    PCT > 2.0 ng/mL     Systemic infection (sepsis) is likely, unless other causes are known.      PCT > 10.0 ng/mL    Important systemic inflammatory response, almost exclusively due to severe bacterial sepsis or septic shock.    PCT values of < 0.5 ng/mL do not exclude an infection, because localized infections (without systemic signs) may be associated with such low concentrations, or a systemic infection  in its initial stages (<6 hours).  Increased PCT can occur without infection.  PCT concentrations between 0.5 and 2.0 ng/mL should be interpreted taking into account the patients history.  It is recommended to retest PCT within 6-24 hours if any concentrations < 2.0 ng/mL are obtained.    C-reactive Protein [[id number]]  (Abnormal) Collected:  01/10/19 1921    Specimen:  Blood Updated:  01/10/19 2036     C-Reactive Protein 23.88 mg/dL     Troponin [[id number]]  (Normal) Collected:  01/10/19 1921    Specimen:  Blood Updated:  01/10/19 2034     Troponin I <0.012 ng/mL     Comprehensive Metabolic Panel [[id number]]  (Abnormal) Collected:  01/10/19 1921    Specimen:  Blood Updated:  01/10/19 2026     Glucose 100 mg/dL      BUN 7 mg/dL      Creatinine 0.68 mg/dL      Sodium 140 mmol/L      Potassium 3.2 mmol/L      Chloride 99 mmol/L      CO2 29.0 mmol/L      Calcium 7.7 mg/dL      Total Protein 6.6 g/dL      Albumin 3.00 g/dL      ALT (SGPT) 53 U/L      AST (SGOT) 35 U/L      Alkaline Phosphatase 83 U/L      Total Bilirubin 0.7 mg/dL      eGFR   Amer 124 mL/min/1.73      Globulin 3.6 gm/dL      A/G Ratio 0.8 g/dL      BUN/Creatinine Ratio 10.3     Anion Gap 12.0 mmol/L     CK [[id number]]  (Normal) Collected:  01/10/19 1921    Specimen:  Blood Updated:  01/10/19 2026     Creatine Kinase 58 U/L     Lactic Acid, Plasma [[id number]]  (Normal) Collected:  01/10/19 1920    Specimen:  Blood Updated:  01/10/19 2022     Lactate 1.1 mmol/L     Protime-INR [[id number]]  (Abnormal) Collected:  01/10/19 1920    Specimen:  Blood Updated:  01/10/19 2020     Protime 17.6 Seconds      INR 1.40    aPTT [[id number]]  (Abnormal) Collected:  01/10/19 1920    Specimen:  Blood Updated:  01/10/19 2020     PTT 38.8 seconds     Sedimentation Rate [[id number]]  (Abnormal) Collected:  01/10/19 1920    Specimen:  Blood Updated:  01/10/19 2019     Sed Rate 46 mm/hr     CBC Auto Differential [[id number]]  (Abnormal) Collected:  01/10/19 1920     Specimen:  Blood Updated:  01/10/19 2012     WBC 10.57 10*3/mm3      RBC 3.09 10*6/mm3      Hemoglobin 8.4 g/dL      Hematocrit 25.7 %      MCV 83.2 fL      MCH 27.2 pg      MCHC 32.7 g/dL      RDW 14.5 %      RDW-SD 43.8 fl      MPV 10.9 fL      Platelets 444 10*3/mm3      Neutrophil % 74.4 %      Lymphocyte % 10.8 %      Monocyte % 11.9 %      Eosinophil % 2.3 %      Basophil % 0.2 %      Immature Grans % 0.4 %      Neutrophils, Absolute 7.87 10*3/mm3      Lymphocytes, Absolute 1.14 10*3/mm3      Monocytes, Absolute 1.26 10*3/mm3      Eosinophils, Absolute 0.24 10*3/mm3      Basophils, Absolute 0.02 10*3/mm3      Immature Grans, Absolute 0.04 10*3/mm3      nRBC 0.0 /100 WBC         Hospital Course:  The patient originally presented on 1/10 as a transfer from [facility] in [place], [place].  Transfer was initially sought at [place] or [facility], but both facilities were at capacity.     The patient had a recent (18) delivery of a healthy baby girl by .  She developed problems with shortness of breath and chest discomfort thereafter.  She was found to have a moderate left pleural effusion and a large pericardial effusion with evidence of tamponade physiology. After stabilization here, she underwent a subxiphoid pericardial window with Dr. Dougherty on .  Transthoracic echocardiogram repeated on  as above. Fluid studies remain unrevealing thus far.     Patient has no intention to breast feed.      She also has a history of rheumatoid arthritis.  Dr. Bray has seen her.  She has been placed on a prednisone taper and is also receiving colchicine.  He thinks that her issues could in fact be related to rheumatoid arthritis.  She previously took Plaquenil chronically.     She has had one blood culture come back positive for coagulase-negative Staphylococcus.  This is likely contaminant. Surveillance cultures negative.     Checked anemia substrates, which are  "consistent with iron deficiency. IV Venofer given on 1/16.      Lovenox was used for DVT prophylaxis.    She has been cleared for discharge by cardiothoracic surgery and rheumatology.  She will return to see them in 1 month.  She will continue on prednisone and colchicine as recommended by rheumatology.  She will see her primary care provider in one week.  She is extremely anxious to go home and be with her children and .    Physical Exam on Discharge:  /70 (BP Location: Left arm, Patient Position: Lying)   Pulse 65   Temp 98.9 °F (37.2 °C) (Temporal)   Resp 16   Ht 162.6 cm (64\")   Wt 99.6 kg (219 lb 8 oz)   SpO2 95%   BMI 37.68 kg/m²    Physical Exam  Constitutional: She is oriented to person, place, and time. She appears well-developed and well-nourished. Up in bed, eating breakfast. No distress. No family present. Discussed with her nurse, [place].   Head: Normocephalic and atraumatic.   Eyes: Conjunctivae and EOM are normal. Pupils are equal, round, and reactive to light.   Neck: Neck supple. No JVD present.   Cardiovascular: Normal rate, regular rhythm, normal heart sounds and intact distal pulses. Exam reveals no gallop and no friction rub. No murmur heard. Pericardial drains removed on 1/16.    Pulmonary/Chest: Effort normal and breath sounds normal. No respiratory distress. She has no wheezes. She has no rales. She exhibits no tenderness.   Abdominal: Soft. Bowel sounds are normal. She exhibits no distension. There is no tenderness. There is no rebound and no guarding.   Musculoskeletal: Normal range of motion. She exhibits no edema, tenderness or deformity.   Neurological: She is alert and oriented to person, place, and time. She displays normal reflexes. No cranial nerve deficit. She exhibits normal muscle tone.   Skin: Skin is warm and dry. No rash noted.   Psychiatric: She has a normal mood and affect. Her behavior is normal. Judgment and thought content normal.     Condition on " Discharge: Good.     Discharge Disposition:  Home or Self Care    Discharge Medications:     Discharge Medications      New Medications      Instructions Start Date   colchicine 0.6 MG tablet   0.6 mg, Oral, Every 12 Hours Scheduled      metoprolol tartrate 25 MG tablet  Commonly known as:  LOPRESSOR   25 mg, Oral, Every 12 Hours Scheduled      oxyCODONE-acetaminophen 5-325 MG per tablet  Commonly known as:  PERCOCET   1 tablet, Oral, Every 6 Hours PRN      predniSONE 10 MG tablet  Commonly known as:  DELTASONE   Take 1 tablet daily for 5 days, then take one half tablet daily for 5 days, then stop.         Continue These Medications      Instructions Start Date   pantoprazole 40 MG EC tablet  Commonly known as:  PROTONIX   40 mg, Oral, Daily         Stop These Medications    ibuprofen 800 MG tablet  Commonly known as:  ADVIL,MOTRIN          Discharge Diet:   Diet Instructions     Diet: Regular; Thin      Discharge Diet:  Regular    Fluid Consistency:  Thin        Activity at Discharge:   Activity Instructions     Activity as Tolerated          Follow-up Appointments:   1. Shyann Fowler NP in [place], [place] in 1 week.   2. Dr. Dougherty in 1 month with a CXR.   3. Dr. Bray in 1 month.   (We will try to coordinate both of the specialty followups being on the same day if able).     Test Results Pending at Discharge: None.     Bob Lorenzo DO  01/17/19  8:35 AM    Time: 35 minutes.           Electronically signed by Bob Lorenzo DO at 1/17/2019  8:46 AM

## 2019-01-18 ENCOUNTER — READMISSION MANAGEMENT (OUTPATIENT)
Dept: CALL CENTER | Facility: HOSPITAL | Age: 29
End: 2019-01-18

## 2019-01-18 LAB
BACTERIA SPEC AEROBE CULT: NORMAL
BACTERIA SPEC AEROBE CULT: NORMAL

## 2019-01-18 NOTE — OUTREACH NOTE
Prep Survey      Responses   Facility patient discharged from?  [facility]   Is patient eligible?  Yes   Discharge diagnosis  Pleural effusion, pericarditis, rheumatoid arthritis, sickle cell trait, acute pericarditis associated with rheumatoid arthritis, microcytic anemia, s/p pericardial window and drain placement 01/11/19, drain removed 01/16/19   Does the patient have one of the following disease processes/diagnoses(primary or secondary)?  Cardiothoracic surgery   Does the patient have Home health ordered?  No   Is there a DME ordered?  No   Comments regarding appointments  See AVS   Prep survey completed?  Yes          Alecia Cruz RN

## 2019-01-21 ENCOUNTER — READMISSION MANAGEMENT (OUTPATIENT)
Dept: CALL CENTER | Facility: HOSPITAL | Age: 29
End: 2019-01-21

## 2019-01-21 NOTE — OUTREACH NOTE
CT Surgery Week 1 Survey      Responses   Facility patient discharged from?  [facility]   Does the patient have one of the following disease processes/diagnoses(primary or secondary)?  Cardiothoracic surgery   Is there a successful TCM telephone encounter documented?  No   Week 1 attempt successful?  Yes   Call start time  1552   Call end time  1600   Discharge diagnosis  Pleural effusion, pericarditis, rheumatoid arthritis, sickle cell trait, acute pericarditis associated with rheumatoid arthritis, microcytic anemia, s/p pericardial window and drain placement 01/11/19, drain removed 01/16/19   Meds reviewed with patient/caregiver?  Yes   Is the patient having any side effects they believe may be caused by any medication additions or changes?  No   Does the patient have all medications related to this admission filled (includes all antibiotics, pain medications, cardiac medications, etc.)  Yes   Is the patient taking all medications as directed (includes completed medication regime)?  Yes   Does the patient have a primary care provider?   Yes   Does the patient have an appointment scheduled with their C/T surgeon?  Yes   Comments regarding PCP  Pt states she saw PCP on friday and will follow up next week.   Has the patient kept scheduled appointments due by today?  Yes   Psychosocial issues?  No   Comments  Pt states chest tube drainage on right has larger hole and small amount of pus draining.  No warmth to the touch, no pain, no redness present.   Did the patient receive a copy of their discharge instructions?  Yes   Nursing interventions  Reviewed instructions with patient   What is the patient's perception of their health status since discharge?  Improving   Nursing interventions  Nurse provided patient education   Is the patient /caregiver able to teach back basic post-op care?  Take showers only when approved by MD-sponge bathe until then, No tub bath, swimming, or hot tub until instructed by MD, Keep incision  areas clean, dry and protected, Do not remove steri-strips, Lifting as instructed by MD in discharge instructions   Is the patient/caregiver able to teach back signs and symptoms of incisional infection?  Increased redness, swelling or pain at the incisonal site, Increased drainage or bleeding, Incisional warmth, Pus or odor from incision, Fever [Pt was instructed to monitor right incision closely.  She states there is more drainage than left incision.  She will monitor.  Pt educated on s/s to watch for and to notify surgeon if present.]   Is the patient/caregiver able to teach back steps to recovery at home?  Set small, achievable goals for return to baseline health, Rest and rebuild strength, gradually increase activity, Weigh daily, Practice good oral hygiene, Eat a well-balance diet, Make a list of questions for surgeon's appointment   Is the patient /caregiver able to teach back the importance of cardiac rehab?  Yes   Is the patient/caregiver able to teach back the hierarchy of who to call/visit for symptoms/problems? PCP, Specialist, Home health nurse, Urgent Care, ED, 911  Yes   Week 1 call completed?  Yes            Mariia Hernandez RN

## 2019-01-25 LAB
BACTERIA FLD CULT: NORMAL
BACTERIA SPEC AEROBE CULT: NORMAL
BACTERIA SPEC ANAEROBE CULT: NORMAL
BACTERIA SPEC ANAEROBE CULT: NORMAL
GRAM STN SPEC: NORMAL

## 2019-01-28 ENCOUNTER — READMISSION MANAGEMENT (OUTPATIENT)
Dept: CALL CENTER | Facility: HOSPITAL | Age: 29
End: 2019-01-28

## 2019-01-28 NOTE — OUTREACH NOTE
CT Surgery Week 2 Survey      Responses   Facility patient discharged from?  [facility]   Does the patient have one of the following disease processes/diagnoses(primary or secondary)?  Cardiothoracic surgery   Week 2 attempt successful?  No   Unsuccessful attempts  Attempt 1          Tiffanie Petit RN

## 2019-01-29 ENCOUNTER — READMISSION MANAGEMENT (OUTPATIENT)
Dept: CALL CENTER | Facility: HOSPITAL | Age: 29
End: 2019-01-29

## 2019-01-29 NOTE — OUTREACH NOTE
CT Surgery Week 2 Survey      Responses   Facility patient discharged from?  [facility]   Does the patient have one of the following disease processes/diagnoses(primary or secondary)?  Cardiothoracic surgery   Week 2 attempt successful?  Yes   Call start time  1303   Revoke  Decline to participate   Call end time  1305          Charlene Caraballo RN

## 2019-02-19 ENCOUNTER — HOSPITAL ENCOUNTER (OUTPATIENT)
Dept: GENERAL RADIOLOGY | Facility: HOSPITAL | Age: 29
Discharge: HOME | End: 2019-02-19
Admitting: THORACIC SURGERY (CARDIOTHORACIC VASCULAR SURGERY)

## 2019-02-19 ENCOUNTER — OFFICE VISIT (OUTPATIENT)
Dept: CARDIAC SURGERY | Facility: CLINIC | Age: 29
End: 2019-02-19

## 2019-02-19 VITALS
OXYGEN SATURATION: 99 % | BODY MASS INDEX: 35.85 KG/M2 | SYSTOLIC BLOOD PRESSURE: 122 MMHG | HEART RATE: 40 BPM | HEIGHT: 64 IN | WEIGHT: 210 LBS | DIASTOLIC BLOOD PRESSURE: 82 MMHG

## 2019-02-19 DIAGNOSIS — J90 PLEURAL EFFUSION: Primary | ICD-10-CM

## 2019-02-19 DIAGNOSIS — I30.9 ACUTE PERICARDITIS ASSOCIATED WITH RHEUMATOID ARTHRITIS (HCC): ICD-10-CM

## 2019-02-19 DIAGNOSIS — M06.9 RHEUMATOID ARTHRITIS, INVOLVING UNSPECIFIED SITE, UNSPECIFIED RHEUMATOID FACTOR PRESENCE: ICD-10-CM

## 2019-02-19 DIAGNOSIS — M06.9 ACUTE PERICARDITIS ASSOCIATED WITH RHEUMATOID ARTHRITIS (HCC): ICD-10-CM

## 2019-02-19 PROCEDURE — 71046 X-RAY EXAM CHEST 2 VIEWS: CPT

## 2019-02-19 PROCEDURE — 99024 POSTOP FOLLOW-UP VISIT: CPT | Performed by: THORACIC SURGERY (CARDIOTHORACIC VASCULAR SURGERY)

## 2019-02-22 LAB
FUNGUS WND CULT: NORMAL
FUNGUS WND CULT: NORMAL
MYCOBACTERIUM SPEC CULT: NORMAL
MYCOBACTERIUM SPEC CULT: NORMAL
NIGHT BLUE STAIN TISS: NORMAL

## 2019-03-12 NOTE — PROGRESS NOTES
"Subjective   Patient ID: Sheeba Downs is a 29 y.o. female who is here for follow-up having had Urgent subxiphoid pericardial window on January 11, 2019.        History of Present Illness  Post operative recovery was unevent.  Sleep habits are nearing baseline considering she has recently had a baby.  Pain control has been good.  She still has some tenderness to her upper abdomen but her family reports she is doing too much and lifting too much.  No fevers/sweats/chills.   No drainage from incision.  No chest pain.  She does have some dyspnea but it is improving. She is eating well.      The following portions of the patient's history were reviewed and updated as appropriate: allergies, current medications, past family history, past medical history, past social history, past surgical history and problem list.        Objective   Visit Vitals  /82 (BP Location: Right arm, Patient Position: Sitting, Cuff Size: Adult)   Pulse (!) 40   Ht 162.6 cm (64\")   Wt 95.3 kg (210 lb)   SpO2 99%   BMI 36.05 kg/m²     Heart rate was rechecked and estimated on physical exam to approximately 60 beats per minute    Physical Exam   Constitutional: She is oriented to person, place, and time. She appears well-developed.   HENT:   Head: Normocephalic and atraumatic.   Mouth/Throat: Oropharynx is clear and moist.   Eyes: EOM are normal. Pupils are equal, round, and reactive to light.   Neck: Normal range of motion. Neck supple. No JVD present. No tracheal deviation present. No thyromegaly present.   Cardiovascular: Normal rate, regular rhythm, normal heart sounds and intact distal pulses. Exam reveals no gallop and no friction rub.   No murmur heard.  No rub.  Heart sounds normal.   Pulmonary/Chest: Effort normal and breath sounds normal. No respiratory distress. She has no wheezes. She has no rales. She exhibits no tenderness.   Abdominal: Soft. She exhibits no distension. There is no tenderness.   Subxiphoid incision is " well-healed.  No abdominal hernia evident.   Musculoskeletal: Normal range of motion. She exhibits no edema.   Lymphadenopathy:     She has no cervical adenopathy.   Neurological: She is alert and oriented to person, place, and time. No cranial nerve deficit.   Skin: Skin is warm and dry.   Psychiatric: She has a normal mood and affect.         Xr Chest 2 View    Result Date: 2/19/2019  Narrative: XR CHEST 2 VW- 2/19/2019 1:20 PM CST  HISTORY: pleural effusion; [id number] effusion, not elsewhere classified   COMPARISON: 1/17/2019.  FINDINGS: Upright frontal and lateral radiographs of the chest demonstrate cardiomegaly and a small RIGHT pleural effusion. The lungs are otherwise clear.  The osseous structures and surrounding soft tissues demonstrate no acute abnormality.      Impression: 1. Cardiomegaly and small RIGHT pleural effusion.   This report was finalized on 02/19/2019 13:50 by Dr. Deondre Marino MD.        Assessment/Plan       Sheeba was seen today for post-op follow-up.    Diagnoses and all orders for this visit:    Pleural effusion  -     XR Chest 2 View    Acute pericarditis associated with rheumatoid arthritis (CMS/HCC)    Rheumatoid arthritis, involving unspecified site, unspecified rheumatoid factor presence (CMS/HCC)        Overall, Sheeba Downs is doing well.  We discussed postoperative weight restrictions.  It does seem that she is doing too much too quick.  I tried to reinforce that this time she should  no greater than 10 pounds.  I tried to acknowledge the challenges that are in place being a new mother again.  I try to provide supportive encouragement that with time her restrictions will be minor.  We discussed it can take some time for dyspnea to recover.  That being said she did have a pleural effusion noted previously during inpatient status and I recommended a new chest x-ray.  Her physical exam would suggest no substantial pleural effusion is evident.  I would like to  see her back in 1 month to reevaluate her status and progress.    Questions been answered.    She has been congratulated as to her non-smoking status.      Patient's Body mass index is 36.05 kg/m². BMI is above normal parameters. Recommendations include: exercise counseling, nutrition counseling and referral to primary care.

## (undated) DEVICE — MAJOR DOUBLE BASIN W/GOWNS II: Brand: MEDLINE INDUSTRIES, INC.

## (undated) DEVICE — 3M™ IOBAN™ 2 ANTIMICROBIAL INCISE DRAPE 6650EZ: Brand: IOBAN™ 2

## (undated) DEVICE — PAD GRND REM POLYHESIVE A/ DISP

## (undated) DEVICE — SUT SILK 0 SUTUPAK TIES 24IN SA76G

## (undated) DEVICE — SUT SILK 3/0 SUTUPAK TIES 24IN SA74H

## (undated) DEVICE — PAD MINOR UNIVERSAL: Brand: MEDLINE INDUSTRIES, INC.

## (undated) DEVICE — GLV SURG TRIUMPH MICRO PF LTX 8.5 STRL

## (undated) DEVICE — DRSNG SURESITE123 6X8IN

## (undated) DEVICE — APPL CHLORAPREP W/TINT 26ML ORNG

## (undated) DEVICE — SUT SILK 2/0 SUTUPAK TIES 24IN SA75H

## (undated) DEVICE — ELECTRD BLD EXT EDGE 1P COAT 6.5IN STRL

## (undated) DEVICE — SUT MNCRYL 4/0 PS2 27IN UD MCP426H

## (undated) DEVICE — BLAKE CARDIO CONNECTOR 1:1: Brand: J-VAC

## (undated) DEVICE — DRAIN,WOUND,ROUND,24FR,5/16",FULL-FLUTED: Brand: MEDLINE

## (undated) DEVICE — TOWL OR 36X36IN LG

## (undated) DEVICE — CLTH CLENS READYCLEANSE PERI CARE PK/5

## (undated) DEVICE — 28 FR STRAIGHT – SOFT PVC CATHETER: Brand: PVC THORACIC CATHETERS

## (undated) DEVICE — PK TURNOVER RM ADV

## (undated) DEVICE — CONTAINER,SPECIMEN,OR STERILE,4OZ: Brand: MEDLINE

## (undated) DEVICE — NDL HYPO PRECISIONGLIDE REG 25G 1 1/2

## (undated) DEVICE — SPONGE,LAP,12"X12",XR,ST,5/PK,40PK/CS: Brand: MEDLINE

## (undated) DEVICE — CABLE TIES: Brand: BIOSEAL INC.

## (undated) DEVICE — DRAPE,UTILITY,TAPE,15X26,STERILE: Brand: MEDLINE

## (undated) DEVICE — SUT SILK 2/0 FS BLK 18IN 685G

## (undated) DEVICE — SUT SILK 4/0 SUTUPAK TIES 24IN SA73H

## (undated) DEVICE — GLV SURG BIOGEL LTX PF 6 1/2

## (undated) DEVICE — SUT PDS LP 0 CTX VIO 60IN Z990G

## (undated) DEVICE — OASIS DRAIN, SINGLE, INLINE & ATS COMPATIBLE: Brand: OASIS

## (undated) DEVICE — PROTECTOR ULNA NERV

## (undated) DEVICE — ELECTRD PAD DEFIB M/FUNC A/

## (undated) DEVICE — TOWEL,OR,DSP,ST,BLUE,DLX,10/PK,8PK/CS: Brand: MEDLINE

## (undated) DEVICE — ELECTRODE,ECG,STRESS,FOAM,50PK: Brand: MEDLINE

## (undated) DEVICE — GLV SURG BIOGEL LTX PF 7 1/2

## (undated) DEVICE — INTENDED FOR TISSUE SEPARATION, AND OTHER PROCEDURES THAT REQUIRE A SHARP SURGICAL BLADE TO PUNCTURE OR CUT.: Brand: BARD-PARKER ® STAINLESS STEEL BLADES

## (undated) DEVICE — PACK,UNIVERSAL,NO GOWNS: Brand: MEDLINE

## (undated) DEVICE — GOWN,NON-REINFORCED,SIRUS,SET IN SLV,XL: Brand: MEDLINE

## (undated) DEVICE — TOTAL TRAY, 16FR 10ML SIL FOLEY, URN: Brand: MEDLINE

## (undated) DEVICE — DRSNG TELFA PAD NONADH STR 1S 3X8IN

## (undated) DEVICE — ANTIBACTERIAL UNDYED BRAIDED (POLYGLACTIN 910), SYNTHETIC ABSORBABLE SUTURE: Brand: COATED VICRYL

## (undated) DEVICE — BLAKE SILICONE DRAINS CARDIO CONNECTOR 2:1: Brand: BLAKE

## (undated) DEVICE — CULT  ANAEROBIC

## (undated) DEVICE — DRSNG SURESITE WNDW 4X4.5

## (undated) DEVICE — UTILITY MARKER W/MED LABELS: Brand: MEDLINE

## (undated) DEVICE — 3M™ IOBAN™ 2 ANTIMICROBIAL INCISE DRAPE 6651EZ: Brand: IOBAN™ 2

## (undated) DEVICE — SKIN AFFIX SURG ADHESIVE 72/CS 0.55ML: Brand: MEDLINE

## (undated) DEVICE — KT NDL GUIDE STRL 18GA

## (undated) DEVICE — SWAB CULT AERO TWIN MD

## (undated) DEVICE — NDL MAYO CATGUT 1/2 CIR 18244D PK/2

## (undated) DEVICE — TRAP,MUCUS SPECIMEN,40CC: Brand: MEDLINE